# Patient Record
Sex: MALE | Race: WHITE | NOT HISPANIC OR LATINO | Employment: OTHER | URBAN - METROPOLITAN AREA
[De-identification: names, ages, dates, MRNs, and addresses within clinical notes are randomized per-mention and may not be internally consistent; named-entity substitution may affect disease eponyms.]

---

## 2017-02-13 ENCOUNTER — CLINICAL SUPPORT (OUTPATIENT)
Dept: ELECTROPHYSIOLOGY | Facility: CLINIC | Age: 77
End: 2017-02-13
Payer: MEDICARE

## 2017-02-13 ENCOUNTER — TELEPHONE (OUTPATIENT)
Dept: ELECTROPHYSIOLOGY | Facility: CLINIC | Age: 77
End: 2017-02-13

## 2017-02-13 DIAGNOSIS — Z95.0 CARDIAC PACEMAKER IN SITU: ICD-10-CM

## 2017-02-13 DIAGNOSIS — I44.2 COMPLETE AV BLOCK: ICD-10-CM

## 2017-02-13 PROCEDURE — 93294 REM INTERROG EVL PM/LDLS PM: CPT | Mod: S$GLB,,, | Performed by: INTERNAL MEDICINE

## 2017-02-13 PROCEDURE — 93296 REM INTERROG EVL PM/IDS: CPT | Mod: S$GLB,,, | Performed by: INTERNAL MEDICINE

## 2017-02-13 NOTE — TELEPHONE ENCOUNTER
I called patient to remind him to send his pacemaker remote transmission. Patient stated he will send it when he gets home from work today.

## 2017-03-31 ENCOUNTER — TELEPHONE (OUTPATIENT)
Dept: FAMILY MEDICINE | Facility: CLINIC | Age: 77
End: 2017-03-31

## 2017-03-31 ENCOUNTER — HOSPITAL ENCOUNTER (OUTPATIENT)
Dept: CARDIOLOGY | Facility: CLINIC | Age: 77
Discharge: HOME | End: 2017-03-31
Payer: MEDICARE

## 2017-03-31 ENCOUNTER — OFFICE VISIT (OUTPATIENT)
Dept: INTERNAL MEDICINE | Facility: CLINIC | Age: 77
End: 2017-03-31
Payer: MEDICARE

## 2017-03-31 VITALS
HEIGHT: 72 IN | DIASTOLIC BLOOD PRESSURE: 66 MMHG | SYSTOLIC BLOOD PRESSURE: 122 MMHG | WEIGHT: 241.88 LBS | HEART RATE: 78 BPM | BODY MASS INDEX: 32.76 KG/M2 | OXYGEN SATURATION: 98 %

## 2017-03-31 DIAGNOSIS — J84.10 CALCIFIED GRANULOMA OF LUNG: ICD-10-CM

## 2017-03-31 DIAGNOSIS — I42.9 CARDIOMYOPATHY, UNSPECIFIED TYPE: ICD-10-CM

## 2017-03-31 DIAGNOSIS — I48.0 PAROXYSMAL ATRIAL FIBRILLATION: ICD-10-CM

## 2017-03-31 DIAGNOSIS — E78.00 PURE HYPERCHOLESTEROLEMIA: Chronic | ICD-10-CM

## 2017-03-31 DIAGNOSIS — I25.10 CORONARY ARTERY DISEASE INVOLVING NATIVE CORONARY ARTERY OF NATIVE HEART WITHOUT ANGINA PECTORIS: ICD-10-CM

## 2017-03-31 DIAGNOSIS — Z00.00 ENCOUNTER FOR PREVENTIVE HEALTH EXAMINATION: Primary | ICD-10-CM

## 2017-03-31 DIAGNOSIS — I44.2 AV BLOCK, COMPLETE: ICD-10-CM

## 2017-03-31 DIAGNOSIS — I42.9 PRIMARY CARDIOMYOPATHY: Chronic | ICD-10-CM

## 2017-03-31 DIAGNOSIS — Z23 NEED FOR VACCINATION AGAINST STREPTOCOCCUS PNEUMONIAE: ICD-10-CM

## 2017-03-31 DIAGNOSIS — I42.9 CARDIOMYOPATHY: ICD-10-CM

## 2017-03-31 PROBLEM — J98.4 CALCIFIED GRANULOMA OF LUNG: Status: ACTIVE | Noted: 2017-03-31

## 2017-03-31 LAB
AORTIC VALVE REGURGITATION: ABNORMAL
DIASTOLIC DYSFUNCTION: YES
ESTIMATED PA SYSTOLIC PRESSURE: 26.04
RETIRED EF AND QEF - SEE NOTES: 40 (ref 55–65)
TRICUSPID VALVE REGURGITATION: ABNORMAL

## 2017-03-31 PROCEDURE — 3074F SYST BP LT 130 MM HG: CPT | Mod: S$GLB,,, | Performed by: NURSE PRACTITIONER

## 2017-03-31 PROCEDURE — 90670 PCV13 VACCINE IM: CPT | Mod: S$GLB,,, | Performed by: NURSE PRACTITIONER

## 2017-03-31 PROCEDURE — G0439 PPPS, SUBSEQ VISIT: HCPCS | Mod: S$GLB,,, | Performed by: NURSE PRACTITIONER

## 2017-03-31 PROCEDURE — G0009 ADMIN PNEUMOCOCCAL VACCINE: HCPCS | Mod: S$GLB,,, | Performed by: NURSE PRACTITIONER

## 2017-03-31 PROCEDURE — 3078F DIAST BP <80 MM HG: CPT | Mod: S$GLB,,, | Performed by: NURSE PRACTITIONER

## 2017-03-31 PROCEDURE — 99999 PR PBB SHADOW E&M-EST. PATIENT-LVL IV: CPT | Mod: PBBFAC,,, | Performed by: NURSE PRACTITIONER

## 2017-03-31 PROCEDURE — 93306 TTE W/DOPPLER COMPLETE: CPT | Mod: S$GLB,,, | Performed by: INTERNAL MEDICINE

## 2017-03-31 NOTE — TELEPHONE ENCOUNTER
Left message to [employer] [phone] to return call to clinic to schedule follow up appointment

## 2017-03-31 NOTE — PATIENT INSTRUCTIONS
Counseling and Referral of Other Preventative  (Italic type indicates deductible and co-insurance are waived)    Patient Name: Aldo Doll  Today's Date: 3/31/2017      SERVICE LIMITATIONS RECOMMENDATION    Vaccines    · Pneumococcal (once after 65)    · Influenza (annually)    · Hepatitis B (if medium/high risk)    · Prevnar 13      Hepatitis B medium/high risk factors:       - End-stage renal disease       - Hemophiliacs who received Factor VII or         IX concentrates       - Clients of institutions for the mentally             retarded       - Persons who live in the same house as          a HepB carrier       - Homosexual men       - Illicit injectable drug abusers     Pneumococcal: Due in 1 year     Influenza: Done, repeat in one year     Hepatitis B: N/A     Prevnar 13: Done, no repeat necessary    Prostate cancer screening (annually to age 75)     Prostate specific antigen (PSA) Shared decision making with Provider. Sometimes a co-pay may be required if the patient decides to have this test. The USPSTF no longer recommends prostate cancer screening routinely in medicine: not to follow    Colorectal cancer screening (to age 75)    · Fecal occult blood test (annual)  · Flexible sigmoidoscopy (5y)  · Screening colonoscopy (10y)  · Barium enema   N/A    Diabetes self-management training (no USPSTF recommendations)  Requires referral by treating physician for patient with diabetes or renal disease. 10 hours of initial DSMT sessions of no less than 30 minutes each in a continuous 12-month period. 2 hours of follow-up DSMT in subsequent years.  N/A    Glaucoma screening (no USPSTF recommendation)  Diabetes mellitus, family history   , age 50 or over    American, age 65 or over  Last done 4/15/13, recommend to repeat every 5  years    Medical nutrition therapy for diabetes or renal disease (no recommended schedule)  Requires referral by treating physician for patient with diabetes or  renal disease or kidney transplant within the past 3 years.  Can be provided in same year as diabetes self-management training (DSMT), and CMS recommends medical nutrition therapy take place after DSMT. Up to 3 hours for initial year and 2 hours in subsequent years.  N/A    Cardiovascular screening blood tests (every 5 years)  · Fasting lipid panel  Order as a panel if possible  Last done 10/8/14, recommend to repeat every 2  years    Diabetes screening tests (at least every 3 years, Medicare covers annually or at 6-month intervals for prediabetic patients)  · Fasting blood sugar (FBS) or glucose tolerance test (GTT)  Patient must be diagnosed with one of the following:       - Hypertension       - Dyslipidemia       - Obesity (BMI 30kg/m2)       - Previous elevated impaired FBS or GTT       ... or any two of the following:       - Overweight (BMI 25 but <30)       - Family history of diabetes       - Age 65 or older       - History of gestational diabetes or birth of baby weighing more than 9 pounds  Last done 2/23/15, recommend to repeat every 3  years    Abdominal aortic aneurysm screening (once)  · Sonogram   Limited to patients who meet one of the following criteria:       - Men who are 65-75 years old and have smoked more than 100 cigarette in their lifetime       - Anyone with a family history of abdominal aortic aneurysm       - Anyone recommended for screening by the USPSTF  N/A    HIV screening (annually for increased risk patients)  · HIV-1 and HIV-2 by EIA, or HAZEL, rapid antibody test or oral mucosa transudate  Patients must be at increased risk for HIV infection per USPSTF guidelines or pregnant. Tests covered annually for patient at increased risk or as requested by the patient. Pregnant patients may receive up to 3 tests during pregnancy.  Risks discussed, screening is not recommended    Smoking cessation counseling (up to 8 sessions per year)  Patients must be asymptomatic of tobacco-related  conditions to receive as a preventative service.  Non-smoker    Subsequent annual wellness visit  At least 12 months since last AWV  Return in one year     The following information is provided to all patients.  This information is to help you find resources for any of the problems found today that may be affecting your health:                Living healthy guide: www.[facility]      Understanding Diabetes: www.diabetes.org      Eating healthy: www.cdc.gov/healthyweight      CDC home safety checklist: www.cdc.gov/steadi/patient.html      Agency on Aging: www.goea.[place].[facility]      Alcoholics anonymous (AA): www.aa.org      Physical Activity: www.david.nih.gov/lb3ysmm      Tobacco use: www.quitwithusla.org

## 2017-03-31 NOTE — Clinical Note
Dr Denton,  I saw Mr Doll for an HRA today.  He would like to continue to see you as his PCP. He said he thought you were a good doctor and liked seeing you.  But, he has had difficulty scheduling a visit with you.  I tried to schedule a follow up, but I was unable to find a schedule on which I could book him an appointment with you.  If you are still doing primary care, and he is still your patient, can you arrange an appointment for him? If not, can you let me know so I can make other arrangements for him? Thank you DEMETRICE Guan

## 2017-03-31 NOTE — PROGRESS NOTES
Aldo Doll presented for a  Medicare AWV and comprehensive Health Risk Assessment today. The following components were reviewed and updated:    · Medical history  · Family History  · Social history  · Allergies and Current Medications  · Health Risk Assessment  · Health Maintenance  · Care Team     ** See Completed Assessments for Annual Wellness Visit within the encounter summary.**       The following assessments were completed:  · Living Situation  · CAGE  · Depression Screening  · Timed Get Up and Go  · Whisper Test  · Cognitive Function Screening  · Nutrition Screening  · ADL Screening  · PAQ Screening    Vitals:    03/31/17 0909   BP: 122/66   Pulse: 78   SpO2: 98%   Weight: 109.7 kg (241 lb 13.5 oz)   Height: 6' (1.829 m)     Body mass index is 32.8 kg/(m^2).  Physical Exam      Diagnoses and health risks identified today and associated recommendations/orders:    1. Encounter for preventive health examination  - Screenings performed, as noted above. Personal preventative testing needs reviewed.     2. Need for vaccination against Streptococcus pneumoniae  - PNEUMOCOCCAL CONJUGATE VACCINE 13-VALENT LESS THAN 6YO & GREATER THAN 51YO IM    3. Coronary artery disease involving native coronary artery of native heart without angina pectoris  - Lipid panel; Future    4. Cardiomyopathy, unspecified type  - Stable; followed by cardiology    5. AV block, complete  - pacemaker in place, followed by cardiology    6. Paroxysmal atrial fibrillation  - anticoagulated on eliquis, followed by cardiology    7. Primary cardiomyopathy  - Stable; followed by cardiology    8. Pure hypercholesterolemia  - worsened on last labs, was unable to tolerate pravastatin.   - Lipid panel; Future    9. Calcified granuloma of lung  - Stable, chronic finding on CXR 11/30/12, 12/25/13      Provided Aldo with a 5-10 year written screening schedule and personal prevention plan. Recommendations were developed using the USPSTF age appropriate  recommendations. Education, counseling, and referrals were provided as needed. After Visit Summary printed and given to patient which includes a list of additional screenings\tests needed.    Return in about 1 year (around 3/31/2018) for your next annual wellness visit.    Carlie Lomax, NP

## 2017-03-31 NOTE — TELEPHONE ENCOUNTER
----- Message from Anthony Denton MD sent at 3/31/2017 12:07 PM CDT -----  Please schedule patient for routine follow up in one month. Thank you

## 2017-03-31 NOTE — TELEPHONE ENCOUNTER
----- Message from Angelito Castillo sent at 3/31/2017 10:11 AM CDT -----  Contact: self  Pt need an EPP appointment per Carlie Lomax NP.  Please call pt to schedule.  Thanks!

## 2017-03-31 NOTE — TELEPHONE ENCOUNTER
Left message with wife to have patient return call to clinic re: to schedule appointment with Dr. Denton

## 2017-04-01 ENCOUNTER — LAB VISIT (OUTPATIENT)
Dept: LAB | Facility: HOSPITAL | Age: 77
End: 2017-04-01
Payer: MEDICARE

## 2017-04-01 DIAGNOSIS — I25.10 CORONARY ARTERY DISEASE INVOLVING NATIVE CORONARY ARTERY OF NATIVE HEART WITHOUT ANGINA PECTORIS: ICD-10-CM

## 2017-04-01 DIAGNOSIS — E78.00 PURE HYPERCHOLESTEROLEMIA: Chronic | ICD-10-CM

## 2017-04-01 LAB
CHOLEST/HDLC SERPL: 4 {RATIO}
HDL/CHOLESTEROL RATIO: 25.1 %
HDLC SERPL-MCNC: 207 MG/DL
HDLC SERPL-MCNC: 52 MG/DL
LDLC SERPL CALC-MCNC: 136.8 MG/DL
NONHDLC SERPL-MCNC: 155 MG/DL
TRIGL SERPL-MCNC: 91 MG/DL

## 2017-04-01 PROCEDURE — 80061 LIPID PANEL: CPT

## 2017-04-01 PROCEDURE — 36415 COLL VENOUS BLD VENIPUNCTURE: CPT | Mod: PO

## 2017-04-10 ENCOUNTER — OFFICE VISIT (OUTPATIENT)
Dept: FAMILY MEDICINE | Facility: CLINIC | Age: 77
End: 2017-04-10
Payer: MEDICARE

## 2017-04-10 VITALS
HEIGHT: 72 IN | WEIGHT: 242.31 LBS | DIASTOLIC BLOOD PRESSURE: 78 MMHG | HEART RATE: 88 BPM | OXYGEN SATURATION: 96 % | RESPIRATION RATE: 18 BRPM | SYSTOLIC BLOOD PRESSURE: 134 MMHG | BODY MASS INDEX: 32.82 KG/M2 | TEMPERATURE: 97 F

## 2017-04-10 DIAGNOSIS — E78.5 HYPERLIPIDEMIA, UNSPECIFIED HYPERLIPIDEMIA TYPE: ICD-10-CM

## 2017-04-10 DIAGNOSIS — I50.22 CHRONIC SYSTOLIC CONGESTIVE HEART FAILURE: ICD-10-CM

## 2017-04-10 DIAGNOSIS — I48.0 PAROXYSMAL ATRIAL FIBRILLATION: Primary | ICD-10-CM

## 2017-04-10 PROCEDURE — 99499 UNLISTED E&M SERVICE: CPT | Mod: S$GLB,,, | Performed by: INTERNAL MEDICINE

## 2017-04-10 PROCEDURE — 1160F RVW MEDS BY RX/DR IN RCRD: CPT | Mod: S$GLB,,, | Performed by: INTERNAL MEDICINE

## 2017-04-10 PROCEDURE — 99214 OFFICE O/P EST MOD 30 MIN: CPT | Mod: S$GLB,,, | Performed by: INTERNAL MEDICINE

## 2017-04-10 PROCEDURE — 1157F ADVNC CARE PLAN IN RCRD: CPT | Mod: S$GLB,,, | Performed by: INTERNAL MEDICINE

## 2017-04-10 PROCEDURE — 1126F AMNT PAIN NOTED NONE PRSNT: CPT | Mod: S$GLB,,, | Performed by: INTERNAL MEDICINE

## 2017-04-10 PROCEDURE — 99999 PR PBB SHADOW E&M-EST. PATIENT-LVL III: CPT | Mod: PBBFAC,,, | Performed by: INTERNAL MEDICINE

## 2017-04-10 PROCEDURE — 1159F MED LIST DOCD IN RCRD: CPT | Mod: S$GLB,,, | Performed by: INTERNAL MEDICINE

## 2017-04-10 PROCEDURE — 3075F SYST BP GE 130 - 139MM HG: CPT | Mod: S$GLB,,, | Performed by: INTERNAL MEDICINE

## 2017-04-10 PROCEDURE — 3078F DIAST BP <80 MM HG: CPT | Mod: S$GLB,,, | Performed by: INTERNAL MEDICINE

## 2017-04-10 RX ORDER — METOPROLOL SUCCINATE 100 MG/1
100 TABLET, EXTENDED RELEASE ORAL DAILY
Qty: 90 TABLET | Refills: 3 | Status: SHIPPED | OUTPATIENT
Start: 2017-04-10 | End: 2017-05-16 | Stop reason: SDUPTHER

## 2017-04-10 RX ORDER — VALSARTAN 160 MG/1
160 TABLET ORAL DAILY
Qty: 90 TABLET | Refills: 2 | Status: SHIPPED | OUTPATIENT
Start: 2017-04-10 | End: 2017-05-16 | Stop reason: SDUPTHER

## 2017-04-10 NOTE — PROGRESS NOTES
Subjective:       Patient ID: Aldo Doll is a 76 y.o. male.    Chief Complaint: Results; Follow-up; and Cough    HPI Comments: F/u chronic conditions    HPI: 77 y/o w/ HTN systolic HF, HLD and afib presents for scheduled follow up feels.w ell exercising at gym four times per week w/o exertional symptoms still working 20hrs per week as well.f eels well no LE swelling no palpitations no syncope/presyncope    Review of Systems   Constitutional: Negative for activity change, appetite change, fatigue, fever and unexpected weight change.   HENT: Negative for ear pain, rhinorrhea and sore throat.    Eyes: Negative for discharge and visual disturbance.   Respiratory: Negative for chest tightness, shortness of breath and wheezing.    Cardiovascular: Negative for chest pain, palpitations and leg swelling.   Gastrointestinal: Negative for abdominal pain, constipation and diarrhea.   Endocrine: Negative for cold intolerance and heat intolerance.   Genitourinary: Negative for dysuria and hematuria.   Musculoskeletal: Negative for joint swelling and neck stiffness.   Skin: Negative for rash.   Neurological: Negative for dizziness, syncope, weakness and headaches.   Psychiatric/Behavioral: Negative for suicidal ideas.       Objective:     Vitals:    04/10/17 1518   BP: 134/78   BP Location: Right arm   Patient Position: Sitting   BP Method: Manual   Pulse: 88   Resp: 18   Temp: 97.4 °F (36.3 °C)   TempSrc: Oral   SpO2: 96%   Weight: 109.9 kg (242 lb 4.6 oz)   Height: 6' (1.829 m)          Physical Exam   Constitutional: He is oriented to person, place, and time. He appears well-developed and well-nourished.   HENT:   Head: Normocephalic and atraumatic.   Eyes: Conjunctivae are normal. Pupils are equal, round, and reactive to light.   Neck: Normal range of motion.   Cardiovascular: Normal rate.  Exam reveals no gallop and no friction rub.    No murmur heard.  Pulmonary/Chest: Effort normal and breath sounds normal. He has no  wheezes. He has no rales.   Abdominal: Soft. Bowel sounds are normal. There is no tenderness. There is no rebound and no guarding.   Musculoskeletal: Normal range of motion. He exhibits no edema or tenderness.   Neurological: He is alert and oriented to person, place, and time. No cranial nerve deficit.   Skin: Skin is warm and dry.   Psychiatric: He has a normal mood and affect.       Assessment:       1. Paroxysmal atrial fibrillation    2. Chronic systolic congestive heart failure    3. Hyperlipidemia, unspecified hyperlipidemia type        Plan:    1. Rate controlled on NOAC continue    2. euvolemic on ARB BP at goal    3. Unable to tolerate statin continue omega 3

## 2017-04-25 ENCOUNTER — TELEPHONE (OUTPATIENT)
Dept: CARDIOLOGY | Facility: CLINIC | Age: 77
End: 2017-04-25

## 2017-04-25 ENCOUNTER — TELEPHONE (OUTPATIENT)
Dept: ELECTROPHYSIOLOGY | Facility: CLINIC | Age: 77
End: 2017-04-25

## 2017-04-25 DIAGNOSIS — I25.10 CORONARY ARTERY DISEASE, ANGINA PRESENCE UNSPECIFIED, UNSPECIFIED VESSEL OR LESION TYPE, UNSPECIFIED WHETHER NATIVE OR TRANSPLANTED HEART: Primary | ICD-10-CM

## 2017-04-25 NOTE — TELEPHONE ENCOUNTER
----- Message from Ana Mac RN sent at 4/25/2017  3:21 PM CDT -----  Contact: Wife of pt called  SHERIE Nation,     Please see message below. I tried to call the Pt's spouse, however she did not answer. Can you please try and reach out to her tomorrow. This may need a shift intervention.       Thanks,     RV  ----- Message -----     From: Verenice Hernandez     Sent: 4/25/2017   1:51 PM       To: [facility] Arrhythmia Rn Staff    Wife of pt called, states their is a confusion with RX Diovan. She states pt is coughing and is needing to speak width you.Ph [phone] or [phone]. Thank you

## 2017-04-25 NOTE — TELEPHONE ENCOUNTER
Pt's wife reports she notes pt coughing more lately and realized pt has been taking generic brand of diovan for past month, and is concerned this is the cause. She is asking for refill in brand name only, and is not concerned about cost. Called [facility] and authorized brand name for pt on refill.

## 2017-05-16 ENCOUNTER — OFFICE VISIT (OUTPATIENT)
Dept: CARDIOLOGY | Facility: CLINIC | Age: 77
End: 2017-05-16
Payer: MEDICARE

## 2017-05-16 ENCOUNTER — HOSPITAL ENCOUNTER (OUTPATIENT)
Dept: CARDIOLOGY | Facility: CLINIC | Age: 77
Discharge: HOME | End: 2017-05-16
Payer: MEDICARE

## 2017-05-16 VITALS
HEIGHT: 72 IN | BODY MASS INDEX: 33 KG/M2 | DIASTOLIC BLOOD PRESSURE: 63 MMHG | SYSTOLIC BLOOD PRESSURE: 117 MMHG | WEIGHT: 243.63 LBS | HEART RATE: 73 BPM

## 2017-05-16 DIAGNOSIS — I50.22 CHRONIC SYSTOLIC CONGESTIVE HEART FAILURE: ICD-10-CM

## 2017-05-16 DIAGNOSIS — I48.0 PAROXYSMAL ATRIAL FIBRILLATION: Primary | ICD-10-CM

## 2017-05-16 DIAGNOSIS — Z79.01 CURRENT USE OF LONG TERM ANTICOAGULATION: ICD-10-CM

## 2017-05-16 DIAGNOSIS — I42.9 PRIMARY CARDIOMYOPATHY: Chronic | ICD-10-CM

## 2017-05-16 DIAGNOSIS — I25.10 CORONARY ARTERY DISEASE INVOLVING NATIVE CORONARY ARTERY OF NATIVE HEART WITHOUT ANGINA PECTORIS: ICD-10-CM

## 2017-05-16 DIAGNOSIS — Z95.0 CARDIAC PACEMAKER IN SITU: ICD-10-CM

## 2017-05-16 DIAGNOSIS — I44.2 AV BLOCK, COMPLETE: ICD-10-CM

## 2017-05-16 DIAGNOSIS — I25.10 CORONARY ARTERY DISEASE, ANGINA PRESENCE UNSPECIFIED, UNSPECIFIED VESSEL OR LESION TYPE, UNSPECIFIED WHETHER NATIVE OR TRANSPLANTED HEART: ICD-10-CM

## 2017-05-16 PROCEDURE — 1159F MED LIST DOCD IN RCRD: CPT | Mod: GC,S$GLB,, | Performed by: INTERNAL MEDICINE

## 2017-05-16 PROCEDURE — 3078F DIAST BP <80 MM HG: CPT | Mod: GC,S$GLB,, | Performed by: INTERNAL MEDICINE

## 2017-05-16 PROCEDURE — 1126F AMNT PAIN NOTED NONE PRSNT: CPT | Mod: GC,S$GLB,, | Performed by: INTERNAL MEDICINE

## 2017-05-16 PROCEDURE — 1160F RVW MEDS BY RX/DR IN RCRD: CPT | Mod: GC,S$GLB,, | Performed by: INTERNAL MEDICINE

## 2017-05-16 PROCEDURE — 3074F SYST BP LT 130 MM HG: CPT | Mod: GC,S$GLB,, | Performed by: INTERNAL MEDICINE

## 2017-05-16 PROCEDURE — 99213 OFFICE O/P EST LOW 20 MIN: CPT | Mod: GC,S$GLB,, | Performed by: INTERNAL MEDICINE

## 2017-05-16 PROCEDURE — 93000 ELECTROCARDIOGRAM COMPLETE: CPT | Mod: S$GLB,,, | Performed by: INTERNAL MEDICINE

## 2017-05-16 PROCEDURE — 99999 PR PBB SHADOW E&M-EST. PATIENT-LVL III: CPT | Mod: PBBFAC,GC,, | Performed by: INTERNAL MEDICINE

## 2017-05-16 RX ORDER — VALSARTAN 160 MG/1
160 TABLET ORAL DAILY
Qty: 90 TABLET | Refills: 4 | Status: SHIPPED | OUTPATIENT
Start: 2017-05-16 | End: 2018-04-18 | Stop reason: SDUPTHER

## 2017-05-16 RX ORDER — METOPROLOL SUCCINATE 100 MG/1
100 TABLET, EXTENDED RELEASE ORAL DAILY
Qty: 90 TABLET | Refills: 4 | Status: SHIPPED | OUTPATIENT
Start: 2017-05-16 | End: 2018-08-06 | Stop reason: SDUPTHER

## 2017-05-16 NOTE — MR AVS SNAPSHOT
[facility]  [address]  [facility] [address]  Phone: [phone]                  Aldo Doll   2017 3:00 PM   Office Visit    Description:  Male : 1940   Provider:  Antonio Guerin MD   Department:  [facility]           Reason for Visit     Atrial Fibrillation           Diagnoses this Visit        Comments    Paroxysmal atrial fibrillation    -  Primary     AV block, complete         Primary cardiomyopathy         Coronary artery disease involving native coronary artery of native heart without angina pectoris         Cardiac pacemaker in situ         Current use of long term anticoagulation         Chronic systolic congestive heart failure                To Do List           Future Appointments        Provider Department Dept Phone    2017 8:00 AM HOME MONITOR DEVICE CHECK, [facility] [phone]      Goals (5 Years of Data)     None      Follow-Up and Disposition     Return in about 1 year (around 2018).    Follow-up and Disposition History       These Medications        Disp Refills Start End    valsartan (DIOVAN) 160 MG tablet 90 tablet 4 2017     Take 1 tablet (160 mg total) by mouth once daily. - Oral    Pharmacy: [address] Ph #: [phone]       metoprolol succinate (TOPROL-XL) 100 MG 24 hr tablet 90 tablet 4 2017     Take 1 tablet (100 mg total) by mouth once daily. - Oral    Pharmacy: [address] Ph #: [phone]       Notes to Pharmacy: PT WANTS A 90 DAY SUPLLY ON HER MEDS      [facility] On [facility]     [facility] - 24/ Assistance  Unless otherwise directed by your provider, please contact [facility], our nurse care line that is available for / assistance.     Registered nurses in the [facility] provide: appointment scheduling, clinical advisement, health  education, and other advisory services.  Call: [phone] (toll free)               Medications           Message regarding Medications     Verify the changes and/or additions to your medication regime listed below are the same as discussed with your clinician today.  If any of these changes or additions are incorrect, please notify your healthcare provider.             Verify that the below list of medications is an accurate representation of the medications you are currently taking.  If none reported, the list may be blank. If incorrect, please contact your healthcare provider. Carry this list with you in case of emergency.           Current Medications     ELIQUIS 5 mg Tab TAKE ONE TABLET BY MOUTH TWICE DAILY    fish oil-omega-3 fatty acids 300-1,000 mg capsule Take 3 g by mouth once daily.    metoprolol succinate (TOPROL-XL) 100 MG 24 hr tablet Take 1 tablet (100 mg total) by mouth once daily.    valsartan (DIOVAN) 160 MG tablet Take 1 tablet (160 mg total) by mouth once daily.           Clinical Reference Information           Your Vitals Were     BP Pulse Height Weight BMI    117/63 (BP Location: Left arm, Patient Position: Sitting, BP Method: Automatic) 73 6' (1.829 m) 110.5 kg (243 lb 9.7 oz) 33.04 kg/m2      Blood Pressure          Most Recent Value    Right Arm BP - Sitting  119/67    Left Arm BP - Sitting  117/63    BP  117/63      Allergies as of 5/16/2017     Pradaxa [Dabigatran Etexilate]    Ace Inhibitors    Chocolate Flavor      Immunizations Administered on Date of Encounter - 5/16/2017     None      Language Assistance Services     ATTENTION: Language assistance services are available, free of charge. Please call [phone].      ATENCIÓN: Si habla español, tiene a warren disposición servicios gratuitos de asistencia lingüística. Llame al [phone].     CHÚ Ý: N?u b?n nói Ti?ng Vi?t, có các d?ch v? h? tr? ngôn ng? mi?n phí dành cho b?n. G?i s? [phone].         [facility]  complies with applicable Federal civil rights laws and does not discriminate on the basis of race, color, national origin, age, disability, or sex.

## 2017-05-16 NOTE — PROGRESS NOTES
Cardiology Clinic Note    5/16/2017    LAURA Doll is a 76 y.o. 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  He was seen by Dr. Monteiro in Nov 2016 and was transitioned from Coumadin to Eliquis at that time. He reports doing well since that time with Eliquis without any significant episodes of bleeding. He reports he was previously placed on Cozaar due to increased copayment from his insurance with his previously prescribed Diovan but reports when he was on Cozaar, he started having significant cough. Since that time, been placed back on Diovan which he reports he is able to pay for since he is getting generic Diovan. Otherwise, he reports feeling well. He is still working and currently building a boat.     He denies chest pain, SOB, orthopnea, PND, JOY, palpitations, lightheadedness, syncope, LE edema.    Review of Systems   Constitution: Negative for chills, fever  Cardiovascular: Negative for chest pain dyspnea on exertion, leg swelling, orthopnea, palpitations, paroxysmal nocturnal dyspnea.   Respiratory: Negative for shortness of breath. Negative for cough.    Endocrine: Negative for heat or cold intolerance    Hematologic/Lymphatic: Negative for easy bruising or bleeding    Skin: Negative for color change and rash.   Musculoskeletal: Positive for left hip pain which is chronic  Gastrointestinal: Negative for abdominal pain, nausea, vomiting, diarrhea  Neurological: Negative for dizziness, light-headedness and loss of balance.   Psychiatric/Behavioral: Negative for altered mental status.     OBJECTIVE  Current Outpatient Prescriptions   Medication Sig Dispense Refill    ELIQUIS 5 mg Tab TAKE ONE TABLET BY MOUTH TWICE DAILY 180 tablet 3    fish oil-omega-3 fatty acids 300-1,000 mg capsule Take 3 g by mouth  once daily.      metoprolol succinate (TOPROL-XL) 100 MG 24 hr tablet Take 1 tablet (100 mg total) by mouth once daily. 90 tablet 4    valsartan (DIOVAN) 160 MG tablet Take 1 tablet (160 mg total) by mouth once daily. 90 tablet 4     Current Facility-Administered Medications   Medication Dose Route Frequency Provider Last Rate Last Dose    levalbuterol nebulizer solution 0.63 mg  0.63 mg Nebulization 1 time in Clinic/HOD hCu Llamas MD           Past Medical History:   Diagnosis Date    *Atrial fibrillation     Anticoagulant long-term use     Arthritis     Atrial fibrillation     Cardiomyopathy     CHF (congestive heart failure)     Coronary artery disease     Heart block     Hyperlipidemia     Hypertension        Past Surgical History:   Procedure Laterality Date    CHOLECYSTECTOMY      HERNIA REPAIR      INSERT / REPLACE / REMOVE PACEMAKER         Review of patient's allergies indicates:   Allergen Reactions    Pradaxa [dabigatran etexilate] Itching and Rash    Ace inhibitors      Other reaction(s): cough    Chocolate flavor      Other reaction(s): Rash       Family History   Problem Relation Age of Onset    Cancer Mother      breast    Cancer Sister      breast    Mental illness Son      depression       Social History     Social History    Marital status:      Spouse name: N/A    Number of children: N/A    Years of education: N/A     Social History Main Topics    Smoking status: Never Smoker    Smokeless tobacco: Never Used    Alcohol use 1.8 oz/week     3 Glasses of wine per week      Comment: social    Drug use: No    Sexual activity: Yes     Partners: Female     Other Topics Concern    None     Social History Narrative       Vitals: /63 (BP Location: Left arm, Patient Position: Sitting, BP Method: Automatic)  Pulse 73  Ht 6' (1.829 m)  Wt 110.5 kg (243 lb 9.7 oz)  BMI 33.04 kg/m2     Physical Exam  Gen: NAD  Head/Eyes/Ears/Nose: NCAT, MMM, good dentition    Neck: No carotid bruits, no JVD  Lung: Clear to auscultation bilaterally, no wheezes  Heart: Normal S1/S2, regular rate and rhythm  Abdomen: Soft, NT/ND, no masses  Extremities: No lower extremity edema.  No wounds or other skin lesions  Skin: Normal color and turgor. No wounds rashes, no petechia, no ecchymoses.   Neuro: AAOx3    EKG Reviewed: AV paced rhythm.  Echo from March 2017 reviewed: EF 40%. No significant changes.     Labs  Lab Results   Component Value Date    WBC 4.89 06/09/2015    HGB 12.7 (L) 06/09/2015    HCT 36.2 (L) 06/09/2015    PLT 86 (L) 06/09/2015    CHOL 207 (H) 04/01/2017    TRIG 91 04/01/2017    HDL 52 04/01/2017    ALT 31 06/09/2015    AST 26 06/09/2015     06/09/2015    K 4.0 06/09/2015     06/09/2015    CREATININE 1.0 06/09/2015    BUN 20 06/09/2015    CO2 28 06/09/2015    TSH 1.808 03/27/2015    PSA 1.3 02/23/2015    INR 1.0 11/16/2015    HGBA1C 5.3 02/23/2015      76 y.o. male with complete heart block s/p dual chamber PPM with generator change on 5/28/2015, non obstructive CAD with mildly depressed EF, AF on Eliquis, HTN who presents today to clinicfor yearly follow up visit.    Diagnoses and all orders for this visit:    HTN - Well controlled  - Continue Diovan 160 mg daily and Toprol- mg daily    Mildly depressed EF  - Continue ARB and B-blocker as above    Paroxysmal atrial fibrillation  Cardiac pacemaker in situ  Current use of long term anticoagulation  AV block, complete  - On Eliquis, no significant bleeding issues, could continue Eliquis 5 mg BID  - Rate control strategy with Toprol-XL    RTC in 1 year    Patient seen and discussed with Dr. Lizeth Guerin MD, MPH  PGY-IV  Cardiovascular Disease Fellow

## 2017-05-19 ENCOUNTER — CLINICAL SUPPORT (OUTPATIENT)
Dept: ELECTROPHYSIOLOGY | Facility: CLINIC | Age: 77
End: 2017-05-19
Payer: MEDICARE

## 2017-05-19 ENCOUNTER — TELEPHONE (OUTPATIENT)
Dept: ELECTROPHYSIOLOGY | Facility: CLINIC | Age: 77
End: 2017-05-19

## 2017-05-19 DIAGNOSIS — Z95.0 CARDIAC PACEMAKER IN SITU: ICD-10-CM

## 2017-05-19 DIAGNOSIS — I44.2 COMPLETE AV BLOCK: ICD-10-CM

## 2017-05-19 PROCEDURE — 93296 REM INTERROG EVL PM/IDS: CPT | Mod: S$GLB,,, | Performed by: INTERNAL MEDICINE

## 2017-05-19 PROCEDURE — 93294 REM INTERROG EVL PM/LDLS PM: CPT | Mod: S$GLB,,, | Performed by: INTERNAL MEDICINE

## 2017-05-19 NOTE — TELEPHONE ENCOUNTER
I called patient to remind him to send his remote pacemaker transmission. No answer. I called patients cell phone number. I spoke with patient. He will send his transmission now.

## 2017-10-17 ENCOUNTER — CLINICAL SUPPORT (OUTPATIENT)
Dept: ELECTROPHYSIOLOGY | Facility: CLINIC | Age: 77
End: 2017-10-17
Payer: MEDICARE

## 2017-10-17 DIAGNOSIS — Z95.0 PACEMAKER: Primary | ICD-10-CM

## 2017-10-17 DIAGNOSIS — I44.2 CHB (COMPLETE HEART BLOCK): ICD-10-CM

## 2017-10-17 DIAGNOSIS — I48.91 ATRIAL FIBRILLATION: ICD-10-CM

## 2017-10-17 DIAGNOSIS — Z95.0 PACEMAKER: ICD-10-CM

## 2017-10-17 DIAGNOSIS — I42.9 CARDIOMYOPATHY: ICD-10-CM

## 2017-10-17 PROCEDURE — 93296 REM INTERROG EVL PM/IDS: CPT | Mod: S$GLB,,, | Performed by: INTERNAL MEDICINE

## 2017-10-17 PROCEDURE — 93294 REM INTERROG EVL PM/LDLS PM: CPT | Mod: S$GLB,,, | Performed by: INTERNAL MEDICINE

## 2017-11-03 DIAGNOSIS — I48.91 ATRIAL FIBRILLATION: ICD-10-CM

## 2017-11-03 RX ORDER — APIXABAN 5 MG/1
TABLET, FILM COATED ORAL
Qty: 180 TABLET | Refills: 3 | Status: SHIPPED | OUTPATIENT
Start: 2017-11-03 | End: 2018-10-26 | Stop reason: SDUPTHER

## 2018-02-15 ENCOUNTER — TELEPHONE (OUTPATIENT)
Dept: ELECTROPHYSIOLOGY | Facility: CLINIC | Age: 78
End: 2018-02-15

## 2018-02-15 NOTE — TELEPHONE ENCOUNTER
----- Message from Divya Mccracken sent at 2/15/2018  4:28 PM CST -----  Contact: pt wife  Please call pt wife at [phone]. Patient is scheduled on 02/22/18 at 140 pm for a pacemaker tune up but need something in the morning    Thank you      Patients wife asked to move his appointment to a day that he can come in early. I moved it to April.

## 2018-04-05 ENCOUNTER — CLINICAL SUPPORT (OUTPATIENT)
Dept: ELECTROPHYSIOLOGY | Facility: CLINIC | Age: 78
End: 2018-04-05
Attending: INTERNAL MEDICINE
Payer: MEDICARE

## 2018-04-05 ENCOUNTER — TELEPHONE (OUTPATIENT)
Dept: ELECTROPHYSIOLOGY | Facility: CLINIC | Age: 78
End: 2018-04-05

## 2018-04-05 DIAGNOSIS — I48.91 ATRIAL FIBRILLATION: ICD-10-CM

## 2018-04-05 DIAGNOSIS — I42.9 CARDIOMYOPATHY: ICD-10-CM

## 2018-04-05 DIAGNOSIS — I44.2 CHB (COMPLETE HEART BLOCK): ICD-10-CM

## 2018-04-05 DIAGNOSIS — Z95.0 PACEMAKER: ICD-10-CM

## 2018-04-05 PROCEDURE — 93280 PM DEVICE PROGR EVAL DUAL: CPT | Mod: S$GLB,,, | Performed by: INTERNAL MEDICINE

## 2018-04-05 NOTE — TELEPHONE ENCOUNTER
Called and spoke with Pt. He was taking Eliquis 5 mg BID. He has sores on his back and when he takes the Eliquis as prescribed the sores bleed. So he started alternating the dose with 5 mg qd every other day and the bleeding stopped. Explained to the Pt that Eliquis is not effective when dose once a day. Advised I would speak with Dr Monteiro and get back with him.         ----- Message from Bry Monteiro MD sent at 4/5/2018  1:30 PM CDT -----  Regarding: RE: Eliquis  He should take 5 mg by mouth twice a day, not 10 mg all at once.  Clarice please  him.  Thanks  DPM    ----- Message -----  From: Pratima Monaeveena  Sent: 4/5/2018   8:52 AM  To: Bry Monteiro MD, Clarice Casiano RN  Subject: Eliquis                                          Dr. Monteiro,  Pt reports if he takes Eliquis 10 mg every other day and 5mg every other day alternating doses due to he has an abscess that starts bleeding if he takes 10 mg daily. States he never had this with Coumadin.  Hx of AF.    Please advise,  Monse Ambrocio

## 2018-04-05 NOTE — TELEPHONE ENCOUNTER
Attempted to contact Pt to discuss his Eliquis and proper dosing. No answer. Left message for Pt to return call.       ----- Message from Bry Monteiro MD sent at 4/5/2018  1:30 PM CDT -----  Regarding: RE: Eliquis  He should take 5 mg by mouth twice a day, not 10 mg all at once.  Clarice please  him.  Thanks  DPM    ----- Message -----  From: Pratima Monaeveena  Sent: 4/5/2018   8:52 AM  To: Bry Monteiro MD, Clarice Casiano RN  Subject: Eliquis                                          Dr. Monteiro,  Pt reports if he takes Eliquis 10 mg every other day and 5mg every other day alternating doses due to he has an abscess that starts bleeding if he takes 10 mg daily. States he never had this with Coumadin.  Hx of AF.    Please advise,  Monse Ambrocio

## 2018-04-05 NOTE — TELEPHONE ENCOUNTER
----- Message from Bry Monteiro MD sent at 4/5/2018  1:30 PM CDT -----  Regarding: RE: Eliquis  He should take 5 mg by mouth twice a day, not 10 mg all at once.  Clarice please  him.  Thanks  DPM    ----- Message -----  From: Pratima Carter  Sent: 4/5/2018   8:52 AM  To: Bry Monteiro MD, Clarice Casiano RN  Subject: Eliquis                                          Dr. Monteiro,  Pt reports if he takes Eliquis 10 mg every other day and 5mg every other day alternating doses due to he has an abscess that starts bleeding if he takes 10 mg daily. States he never had this with Coumadin.  Hx of AF.    Please advise,  Monse Ambrocio

## 2018-04-06 ENCOUNTER — TELEPHONE (OUTPATIENT)
Dept: ELECTROPHYSIOLOGY | Facility: CLINIC | Age: 78
End: 2018-04-06

## 2018-04-06 NOTE — TELEPHONE ENCOUNTER
----- Message from Bry Monteiro MD sent at 4/5/2018  4:32 PM CDT -----  Regarding: RE: Eliquis  He needs to get that sore evaluated and treated, so he can tolerate the appropriate dose of eliquis!  DPM    ----- Message -----  From: Clarice Casiano RN  Sent: 4/5/2018   4:28 PM  To: Bry Monteiro MD  Subject: RE: Eliquis                                      Spoke with Pt. He states he was taking 5mg BID. When he does that he has a sore on his back that bleeds. So he started taking it 5 mg BID one day then  5 mg qd. The bleeding from the sores on his back stopped. Advised that Eliquis is not effective when dosed once daily. Advised I would speak with you and see what needed to be done.  ----- Message -----  From: Bry Monteiro MD  Sent: 4/5/2018   1:30 PM  To: Clarice Brooks RN  Subject: RE: Eliquis                                      He should take 5 mg by mouth twice a day, not 10 mg all at once.  Clarice please  him.  Thanks  DPM    ----- Message -----  From: Pratima Carter  Sent: 4/5/2018   8:52 AM  To: Bry Monteiro MD, Clarice Casiano, RN  Subject: Katia Monteiro,  Pt reports if he takes Eliquis 10 mg every other day and 5mg every other day alternating doses due to he has an abscess that starts bleeding if he takes 10 mg daily. States he never had this with Coumadin.  Hx of AF.    Please advise,  Monse Ambrocio

## 2018-04-18 DIAGNOSIS — I50.22 CHRONIC SYSTOLIC CONGESTIVE HEART FAILURE: ICD-10-CM

## 2018-04-18 RX ORDER — VALSARTAN 160 MG/1
160 TABLET ORAL DAILY
Qty: 90 TABLET | Refills: 4 | Status: SHIPPED | OUTPATIENT
Start: 2018-04-18 | End: 2019-06-05 | Stop reason: SDUPTHER

## 2018-05-16 ENCOUNTER — PATIENT OUTREACH (OUTPATIENT)
Dept: ADMINISTRATIVE | Facility: HOSPITAL | Age: 78
End: 2018-05-16

## 2018-05-16 NOTE — PROGRESS NOTES
Sent a letter per mail for patient to call back and schedule appointment.  Patient has not been seen per Dr. Denton since 04/10/2017.

## 2018-05-22 ENCOUNTER — OFFICE VISIT (OUTPATIENT)
Dept: CARDIOLOGY | Facility: CLINIC | Age: 78
End: 2018-05-22
Payer: MEDICARE

## 2018-05-22 VITALS
SYSTOLIC BLOOD PRESSURE: 118 MMHG | WEIGHT: 244.69 LBS | HEIGHT: 72 IN | HEART RATE: 76 BPM | DIASTOLIC BLOOD PRESSURE: 66 MMHG | BODY MASS INDEX: 33.14 KG/M2

## 2018-05-22 DIAGNOSIS — Z95.0 CARDIAC PACEMAKER IN SITU: ICD-10-CM

## 2018-05-22 DIAGNOSIS — I42.9 CARDIOMYOPATHY, UNSPECIFIED TYPE: ICD-10-CM

## 2018-05-22 DIAGNOSIS — I48.0 PAROXYSMAL ATRIAL FIBRILLATION: ICD-10-CM

## 2018-05-22 DIAGNOSIS — I10 ESSENTIAL HYPERTENSION: Primary | ICD-10-CM

## 2018-05-22 PROCEDURE — 3078F DIAST BP <80 MM HG: CPT | Mod: GC,S$GLB,, | Performed by: INTERNAL MEDICINE

## 2018-05-22 PROCEDURE — 3074F SYST BP LT 130 MM HG: CPT | Mod: GC,S$GLB,, | Performed by: INTERNAL MEDICINE

## 2018-05-22 PROCEDURE — 99213 OFFICE O/P EST LOW 20 MIN: CPT | Mod: GC,S$GLB,, | Performed by: INTERNAL MEDICINE

## 2018-05-22 PROCEDURE — 99999 PR PBB SHADOW E&M-EST. PATIENT-LVL III: CPT | Mod: PBBFAC,GC,, | Performed by: INTERNAL MEDICINE

## 2018-05-22 NOTE — PROGRESS NOTES
Cardiology Clinic Note    5/22/2018    LAURA Doll is a 77 y.o. 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  He was seen by Dr. Monteiro in Nov 2016 and was transitioned from Coumadin to Eliquis at that time. He reports doing well since that time with Eliquis without any significant episodes of bleeding. Per notes from device clinic, patient had episodes where he had red spots on his back and there was concern that Eliquis may have been contributing so he changed his dosing from 5 mg BID to 10 mg daily. This was advised against and he resumed his dosing of 5 mg BID. He reports the red spots on his back spontaneously improved and has not had any recurrence.Otherwise, he reports feeling well. He is still working daily at the [employer] and continues to be active on a daily basis.    He denies chest pain, SOB, orthopnea, PND, JOY, palpitations, lightheadedness, syncope, LE edema.    Review of Systems   Constitution: Negative for chills, fever  Cardiovascular: Negative for chest pain dyspnea on exertion, leg swelling, orthopnea, palpitations, paroxysmal nocturnal dyspnea.   Respiratory: Negative for shortness of breath. Negative for cough.    Endocrine: Negative for heat or cold intolerance    Hematologic/Lymphatic: Negative for easy bruising or bleeding    Skin: Negative for color change and rash.   Musculoskeletal: Positive for left hip pain which is chronic  Gastrointestinal: Negative for abdominal pain, nausea, vomiting, diarrhea  Neurological: Negative for dizziness, light-headedness and loss of balance.   Psychiatric/Behavioral: Negative for altered mental status.     OBJECTIVE  Current Outpatient Prescriptions   Medication Sig Dispense Refill    ELIQUIS 5 mg Tab TAKE ONE Tablet BY MOUTH TWICE DAILY 180 tablet 3     fish oil-omega-3 fatty acids 300-1,000 mg capsule Take 3 g by mouth once daily.      metoprolol succinate (TOPROL-XL) 100 MG 24 hr tablet Take 1 tablet (100 mg total) by mouth once daily. 90 tablet 4    valsartan (DIOVAN) 160 MG tablet Take 1 tablet (160 mg total) by mouth once daily. 90 tablet 4     Current Facility-Administered Medications   Medication Dose Route Frequency Provider Last Rate Last Dose    levalbuterol nebulizer solution 0.63 mg  0.63 mg Nebulization 1 time in Clinic/HOD Chu Llamas MD           Past Medical History:   Diagnosis Date    *Atrial fibrillation     Anticoagulant long-term use     Arthritis     Atrial fibrillation     Cardiomyopathy     CHF (congestive heart failure)     Coronary artery disease     Heart block     Hyperlipidemia     Hypertension        Past Surgical History:   Procedure Laterality Date    CHOLECYSTECTOMY      HERNIA REPAIR      INSERT / REPLACE / REMOVE PACEMAKER         Review of patient's allergies indicates:   Allergen Reactions    Pradaxa [dabigatran etexilate] Itching and Rash    Ace inhibitors      Other reaction(s): cough    Chocolate flavor      Other reaction(s): Rash       Family History   Problem Relation Age of Onset    Cancer Mother         breast    Cancer Sister         breast    Mental illness Son         depression       Social History     Social History    Marital status:      Spouse name: N/A    Number of children: N/A    Years of education: N/A     Social History Main Topics    Smoking status: Never Smoker    Smokeless tobacco: Never Used    Alcohol use 1.8 oz/week     3 Glasses of wine per week      Comment: social    Drug use: No    Sexual activity: Yes     Partners: Female     Other Topics Concern    None     Social History Narrative    None       Vitals: /66 (BP Location: Left arm, Patient Position: Sitting, BP Method: Medium (Automatic))   Pulse 76   Ht 6' (1.829 m)   Wt 111 kg (244 lb 11.4  oz)   BMI 33.19 kg/m²      Physical Exam  Gen: NAD  Head/Eyes/Ears/Nose: NCAT, MMM, good dentition   Neck: No carotid bruits, no JVD  Lung: Clear to auscultation bilaterally, no wheezes  Heart: Normal S1/S2, regular rate and rhythm. PPM c/d/i. No erythrema or purulence.   Abdomen: Soft, NT/ND, no masses  Extremities: No lower extremity edema.  No wounds or other skin lesions  Skin: Normal color and turgor. No wounds rashes, no petechia, no ecchymoses.   Neuro: AAOx3    Labs  Lab Results   Component Value Date    WBC 4.89 06/09/2015    HGB 12.7 (L) 06/09/2015    HCT 36.2 (L) 06/09/2015    PLT 86 (L) 06/09/2015    CHOL 207 (H) 04/01/2017    TRIG 91 04/01/2017    HDL 52 04/01/2017    ALT 31 06/09/2015    AST 26 06/09/2015     06/09/2015    K 4.0 06/09/2015     06/09/2015    CREATININE 1.0 06/09/2015    BUN 20 06/09/2015    CO2 28 06/09/2015    TSH 1.808 03/27/2015    PSA 1.3 02/23/2015    INR 1.0 11/16/2015    HGBA1C 5.3 02/23/2015      77 y.o. 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    Diagnoses and all orders for this visit:    HTN - Well controlled  - Continue Diovan 160 mg daily and Toprol- mg daily    Mildly depressed EF  - Continue ARB and B-blocker as above    Paroxysmal atrial fibrillation  Cardiac pacemaker in situ  Current use of long term anticoagulation  AV block, complete  - On Eliquis, no significant bleeding issues, could continue Eliquis 5 mg BID  - Rate control strategy with Toprol-XL    RTC in 1 year    Patient seen and discussed with Dr. Olu Guerin MD, MPH  PGY-V  Cardiovascular Disease Fellow

## 2018-07-17 ENCOUNTER — CLINICAL SUPPORT (OUTPATIENT)
Dept: ELECTROPHYSIOLOGY | Facility: CLINIC | Age: 78
End: 2018-07-17
Attending: INTERNAL MEDICINE
Payer: MEDICARE

## 2018-07-17 DIAGNOSIS — Z95.0 PACEMAKER: ICD-10-CM

## 2018-07-17 DIAGNOSIS — I42.9 CARDIOMYOPATHY: ICD-10-CM

## 2018-07-17 DIAGNOSIS — I44.2 CHB (COMPLETE HEART BLOCK): ICD-10-CM

## 2018-07-17 DIAGNOSIS — I48.91 ATRIAL FIBRILLATION: ICD-10-CM

## 2018-07-17 PROCEDURE — 93296 REM INTERROG EVL PM/IDS: CPT | Mod: PBBFAC | Performed by: INTERNAL MEDICINE

## 2018-07-17 PROCEDURE — 93294 REM INTERROG EVL PM/LDLS PM: CPT | Mod: ,,, | Performed by: INTERNAL MEDICINE

## 2018-07-23 ENCOUNTER — TELEPHONE (OUTPATIENT)
Dept: ELECTROPHYSIOLOGY | Facility: CLINIC | Age: 78
End: 2018-07-23

## 2018-07-23 NOTE — TELEPHONE ENCOUNTER
----- Message from Shonna Bell MA sent at 7/23/2018 10:58 AM CDT -----  Contact: patient wife called  Please call the patient wife at [phone] she need to talk to you about his device.  Thank you.      Patients wife wanted to make sure he is sending his remote transmissions as scheduled. I informed her that he is.

## 2018-08-06 DIAGNOSIS — I50.22 CHRONIC SYSTOLIC CONGESTIVE HEART FAILURE: ICD-10-CM

## 2018-08-06 DIAGNOSIS — I48.0 PAROXYSMAL ATRIAL FIBRILLATION: ICD-10-CM

## 2018-08-06 RX ORDER — METOPROLOL SUCCINATE 100 MG/1
100 TABLET, EXTENDED RELEASE ORAL DAILY
Qty: 90 TABLET | Refills: 3 | Status: SHIPPED | OUTPATIENT
Start: 2018-08-06 | End: 2019-06-05 | Stop reason: SDUPTHER

## 2018-08-31 ENCOUNTER — OFFICE VISIT (OUTPATIENT)
Dept: FAMILY MEDICINE | Facility: CLINIC | Age: 78
End: 2018-08-31
Payer: MEDICARE

## 2018-08-31 VITALS
HEIGHT: 72 IN | RESPIRATION RATE: 17 BRPM | DIASTOLIC BLOOD PRESSURE: 68 MMHG | OXYGEN SATURATION: 98 % | TEMPERATURE: 98 F | BODY MASS INDEX: 33.39 KG/M2 | WEIGHT: 246.5 LBS | HEART RATE: 76 BPM | SYSTOLIC BLOOD PRESSURE: 118 MMHG

## 2018-08-31 DIAGNOSIS — E78.5 HYPERLIPIDEMIA, UNSPECIFIED HYPERLIPIDEMIA TYPE: Chronic | ICD-10-CM

## 2018-08-31 DIAGNOSIS — I48.0 PAROXYSMAL ATRIAL FIBRILLATION: Primary | ICD-10-CM

## 2018-08-31 DIAGNOSIS — Z79.01 CURRENT USE OF LONG TERM ANTICOAGULATION: ICD-10-CM

## 2018-08-31 DIAGNOSIS — R73.09 ELEVATED RANDOM BLOOD GLUCOSE LEVEL: ICD-10-CM

## 2018-08-31 PROCEDURE — 99214 OFFICE O/P EST MOD 30 MIN: CPT | Mod: S$GLB,,, | Performed by: INTERNAL MEDICINE

## 2018-08-31 PROCEDURE — 99999 PR PBB SHADOW E&M-EST. PATIENT-LVL III: CPT | Mod: PBBFAC,,, | Performed by: INTERNAL MEDICINE

## 2018-08-31 PROCEDURE — 3078F DIAST BP <80 MM HG: CPT | Mod: S$GLB,,, | Performed by: INTERNAL MEDICINE

## 2018-08-31 PROCEDURE — 3074F SYST BP LT 130 MM HG: CPT | Mod: S$GLB,,, | Performed by: INTERNAL MEDICINE

## 2018-08-31 NOTE — PROGRESS NOTES
"Subjective:       Patient ID: Aldo Doll is a 77 y.o. male.    Chief Complaint: Labs Only    Discuss eye exam    HPI: 78 y/o w/ CAD Afib on NOAC presents after recent eye exam was told he had "blood in eye" on left. No vision changes. He is taking apixaban twice per day as prescribed denies hematuria, melena or BRBPR. Feels well exercising daily on treadmill without palpitations or dyspnea no chest pain. No LE swelling. Weight is unchanged      Review of Systems   Constitutional: Negative for activity change, fever and unexpected weight change.   HENT: Negative for congestion, rhinorrhea, sore throat and trouble swallowing.    Eyes: Negative for photophobia and redness.   Respiratory: Negative for cough, chest tightness, shortness of breath and wheezing.    Cardiovascular: Negative for chest pain, palpitations and leg swelling.   Gastrointestinal: Negative for abdominal pain, blood in stool, constipation, diarrhea, nausea and vomiting.   Endocrine: Negative for cold intolerance, heat intolerance and polyuria.   Genitourinary: Negative for decreased urine volume, difficulty urinating, dysuria and urgency.   Musculoskeletal: Negative for arthralgias and back pain.   Skin: Negative for rash.   Neurological: Negative for dizziness, syncope, weakness and headaches.   Psychiatric/Behavioral: Negative for dysphoric mood, sleep disturbance and suicidal ideas.       Objective:     Vitals:    08/31/18 1306   BP: 118/68   BP Location: Right arm   Patient Position: Sitting   BP Method: Large (Manual)   Pulse: 76   Resp: 17   Temp: 98.2 °F (36.8 °C)   TempSrc: Oral   SpO2: 98%   Weight: 111.8 kg (246 lb 7.6 oz)   Height: 6' (1.829 m)          Physical Exam   Constitutional: He is oriented to person, place, and time. He appears well-developed and well-nourished.   HENT:   Head: Normocephalic and atraumatic.   Eyes: Conjunctivae are normal. Pupils are equal, round, and reactive to light.   Neck: Normal range of motion. "   Cardiovascular: Normal rate and regular rhythm. Exam reveals no gallop and no friction rub.   No murmur heard.  Regular rate in 60's   Pulmonary/Chest: Effort normal and breath sounds normal. He has no wheezes. He has no rales.   Abdominal: Soft. Bowel sounds are normal. There is no tenderness. There is no rebound and no guarding.   Musculoskeletal: Normal range of motion. He exhibits no edema or tenderness.   Neurological: He is alert and oriented to person, place, and time. No cranial nerve deficit.   Skin: Skin is warm and dry.   Psychiatric: He has a normal mood and affect.       Assessment and Plan   1. Paroxysmal atrial fibrillation  Rate controlled on noac check cbc  - CBC auto differential; Future    5. Hyperlipidemia, unspecified hyperlipidemia type  Not currently on statin taking omega 3 only he is willing to trial statin check lft's and fasting lipid  - Comprehensive metabolic panel; Future  - Lipid panel; Future    6. Current use of long term anticoagulation  Per cards   - CBC auto differential; Future    7. Elevated random blood glucose level  Request recent optometry note screen for dm with hgba1c  - Hemoglobin A1c; Future

## 2018-09-04 ENCOUNTER — LAB VISIT (OUTPATIENT)
Dept: LAB | Facility: HOSPITAL | Age: 78
End: 2018-09-04
Attending: INTERNAL MEDICINE
Payer: MEDICARE

## 2018-09-04 DIAGNOSIS — E78.5 HYPERLIPIDEMIA, UNSPECIFIED HYPERLIPIDEMIA TYPE: Chronic | ICD-10-CM

## 2018-09-04 DIAGNOSIS — Z79.01 CURRENT USE OF LONG TERM ANTICOAGULATION: ICD-10-CM

## 2018-09-04 DIAGNOSIS — R73.09 ELEVATED RANDOM BLOOD GLUCOSE LEVEL: ICD-10-CM

## 2018-09-04 DIAGNOSIS — I48.0 PAROXYSMAL ATRIAL FIBRILLATION: ICD-10-CM

## 2018-09-04 LAB
ALBUMIN SERPL BCP-MCNC: 4.2 G/DL
ALP SERPL-CCNC: 45 U/L
ALT SERPL W/O P-5'-P-CCNC: 65 U/L
ANION GAP SERPL CALC-SCNC: 6 MMOL/L
AST SERPL-CCNC: 30 U/L
BASOPHILS # BLD AUTO: 0.02 K/UL
BASOPHILS NFR BLD: 0.3 %
BILIRUB SERPL-MCNC: 1.1 MG/DL
BUN SERPL-MCNC: 21 MG/DL
CALCIUM SERPL-MCNC: 9.5 MG/DL
CHLORIDE SERPL-SCNC: 103 MMOL/L
CHOLEST SERPL-MCNC: 215 MG/DL
CHOLEST/HDLC SERPL: 4.3 {RATIO}
CO2 SERPL-SCNC: 30 MMOL/L
CREAT SERPL-MCNC: 0.9 MG/DL
DIFFERENTIAL METHOD: ABNORMAL
EOSINOPHIL # BLD AUTO: 0.3 K/UL
EOSINOPHIL NFR BLD: 4.5 %
ERYTHROCYTE [DISTWIDTH] IN BLOOD BY AUTOMATED COUNT: 14.4 %
EST. GFR  (AFRICAN AMERICAN): >60 ML/MIN/1.73 M^2
EST. GFR  (NON AFRICAN AMERICAN): >60 ML/MIN/1.73 M^2
ESTIMATED AVG GLUCOSE: 100 MG/DL
GLUCOSE SERPL-MCNC: 100 MG/DL
HBA1C MFR BLD HPLC: 5.1 %
HCT VFR BLD AUTO: 40.1 %
HDLC SERPL-MCNC: 50 MG/DL
HDLC SERPL: 23.3 %
HGB BLD-MCNC: 14.2 G/DL
LDLC SERPL CALC-MCNC: 147 MG/DL
LYMPHOCYTES # BLD AUTO: 1.8 K/UL
LYMPHOCYTES NFR BLD: 30.8 %
MCH RBC QN AUTO: 32.1 PG
MCHC RBC AUTO-ENTMCNC: 35.4 G/DL
MCV RBC AUTO: 91 FL
MONOCYTES # BLD AUTO: 0.6 K/UL
MONOCYTES NFR BLD: 9.4 %
NEUTROPHILS # BLD AUTO: 3.2 K/UL
NEUTROPHILS NFR BLD: 54 %
NONHDLC SERPL-MCNC: 165 MG/DL
PLATELET # BLD AUTO: 108 K/UL
PMV BLD AUTO: 9.3 FL
POTASSIUM SERPL-SCNC: 4.5 MMOL/L
PROT SERPL-MCNC: 7.4 G/DL
RBC # BLD AUTO: 4.43 M/UL
SODIUM SERPL-SCNC: 139 MMOL/L
TRIGL SERPL-MCNC: 90 MG/DL
WBC # BLD AUTO: 5.94 K/UL

## 2018-09-04 PROCEDURE — 36415 COLL VENOUS BLD VENIPUNCTURE: CPT | Mod: PN

## 2018-09-04 PROCEDURE — 85025 COMPLETE CBC W/AUTO DIFF WBC: CPT

## 2018-09-04 PROCEDURE — 80061 LIPID PANEL: CPT

## 2018-09-04 PROCEDURE — 83036 HEMOGLOBIN GLYCOSYLATED A1C: CPT

## 2018-09-04 PROCEDURE — 80053 COMPREHEN METABOLIC PANEL: CPT

## 2018-09-05 ENCOUNTER — TELEPHONE (OUTPATIENT)
Dept: FAMILY MEDICINE | Facility: CLINIC | Age: 78
End: 2018-09-05

## 2018-09-05 NOTE — TELEPHONE ENCOUNTER
----- Message from Coral Izquierdo sent at 9/5/2018  2:13 PM CDT -----  Contact: pt  [phone] please call patient with lab results

## 2018-10-09 DIAGNOSIS — I44.2 CHB (COMPLETE HEART BLOCK): ICD-10-CM

## 2018-10-09 DIAGNOSIS — I48.91 ATRIAL FIBRILLATION: ICD-10-CM

## 2018-10-09 DIAGNOSIS — Z95.0 CARDIAC PACEMAKER IN SITU: Primary | ICD-10-CM

## 2018-10-09 DIAGNOSIS — I42.8 OTHER CARDIOMYOPATHIES: ICD-10-CM

## 2018-10-12 ENCOUNTER — TELEPHONE (OUTPATIENT)
Dept: ELECTROPHYSIOLOGY | Facility: CLINIC | Age: 78
End: 2018-10-12

## 2018-10-12 NOTE — TELEPHONE ENCOUNTER
10/12/18 Left pt a vm as a reminder to send an upcoming scheduled manual transmission on 10/15/18.

## 2018-10-15 ENCOUNTER — CLINICAL SUPPORT (OUTPATIENT)
Dept: ELECTROPHYSIOLOGY | Facility: CLINIC | Age: 78
End: 2018-10-15
Attending: INTERNAL MEDICINE
Payer: MEDICARE

## 2018-10-15 ENCOUNTER — TELEPHONE (OUTPATIENT)
Dept: ELECTROPHYSIOLOGY | Facility: CLINIC | Age: 78
End: 2018-10-15

## 2018-10-15 DIAGNOSIS — Z95.0 PACEMAKER: ICD-10-CM

## 2018-10-15 DIAGNOSIS — I44.2 CHB (COMPLETE HEART BLOCK): ICD-10-CM

## 2018-10-15 DIAGNOSIS — I42.9 CARDIOMYOPATHY: ICD-10-CM

## 2018-10-15 DIAGNOSIS — I48.91 ATRIAL FIBRILLATION: ICD-10-CM

## 2018-10-15 PROCEDURE — 93294 REM INTERROG EVL PM/LDLS PM: CPT | Mod: ,,, | Performed by: INTERNAL MEDICINE

## 2018-10-15 PROCEDURE — 93296 REM INTERROG EVL PM/IDS: CPT | Mod: PBBFAC | Performed by: INTERNAL MEDICINE

## 2018-10-26 DIAGNOSIS — I48.91 ATRIAL FIBRILLATION: ICD-10-CM

## 2018-10-29 RX ORDER — APIXABAN 5 MG/1
TABLET, FILM COATED ORAL
Qty: 180 TABLET | Refills: 3 | Status: SHIPPED | OUTPATIENT
Start: 2018-10-29 | End: 2019-06-04 | Stop reason: SDUPTHER

## 2018-12-03 ENCOUNTER — TELEPHONE (OUTPATIENT)
Dept: ADMINISTRATIVE | Facility: HOSPITAL | Age: 78
End: 2018-12-03

## 2019-01-11 DIAGNOSIS — Z95.0 CARDIAC PACEMAKER IN SITU: Primary | ICD-10-CM

## 2019-01-14 ENCOUNTER — CLINICAL SUPPORT (OUTPATIENT)
Dept: CARDIOLOGY | Facility: HOSPITAL | Age: 79
End: 2019-01-14
Attending: INTERNAL MEDICINE
Payer: MEDICARE

## 2019-01-14 ENCOUNTER — TELEPHONE (OUTPATIENT)
Dept: CARDIOLOGY | Facility: HOSPITAL | Age: 79
End: 2019-01-14

## 2019-01-14 DIAGNOSIS — Z95.0 CARDIAC PACEMAKER IN SITU: ICD-10-CM

## 2019-01-14 PROCEDURE — 93296 REM INTERROG EVL PM/IDS: CPT

## 2019-01-29 ENCOUNTER — OFFICE VISIT (OUTPATIENT)
Dept: FAMILY MEDICINE | Facility: CLINIC | Age: 79
End: 2019-01-29
Payer: MEDICARE

## 2019-01-29 VITALS
WEIGHT: 248.19 LBS | HEART RATE: 66 BPM | RESPIRATION RATE: 20 BRPM | HEIGHT: 72 IN | SYSTOLIC BLOOD PRESSURE: 118 MMHG | TEMPERATURE: 99 F | DIASTOLIC BLOOD PRESSURE: 68 MMHG | BODY MASS INDEX: 33.62 KG/M2 | OXYGEN SATURATION: 97 %

## 2019-01-29 DIAGNOSIS — J06.9 VIRAL UPPER RESPIRATORY TRACT INFECTION: Primary | ICD-10-CM

## 2019-01-29 PROCEDURE — 1101F PT FALLS ASSESS-DOCD LE1/YR: CPT | Mod: S$GLB,,, | Performed by: NURSE PRACTITIONER

## 2019-01-29 PROCEDURE — 3078F DIAST BP <80 MM HG: CPT | Mod: S$GLB,,, | Performed by: NURSE PRACTITIONER

## 2019-01-29 PROCEDURE — 99214 OFFICE O/P EST MOD 30 MIN: CPT | Mod: S$GLB,,, | Performed by: NURSE PRACTITIONER

## 2019-01-29 PROCEDURE — 99999 PR PBB SHADOW E&M-EST. PATIENT-LVL III: CPT | Mod: PBBFAC,,, | Performed by: NURSE PRACTITIONER

## 2019-01-29 PROCEDURE — 99999 PR PBB SHADOW E&M-EST. PATIENT-LVL III: ICD-10-PCS | Mod: PBBFAC,,, | Performed by: NURSE PRACTITIONER

## 2019-01-29 PROCEDURE — 1101F PR PT FALLS ASSESS DOC 0-1 FALLS W/OUT INJ PAST YR: ICD-10-PCS | Mod: S$GLB,,, | Performed by: NURSE PRACTITIONER

## 2019-01-29 PROCEDURE — 99214 PR OFFICE/OUTPT VISIT, EST, LEVL IV, 30-39 MIN: ICD-10-PCS | Mod: S$GLB,,, | Performed by: NURSE PRACTITIONER

## 2019-01-29 PROCEDURE — 3074F PR MOST RECENT SYSTOLIC BLOOD PRESSURE < 130 MM HG: ICD-10-PCS | Mod: S$GLB,,, | Performed by: NURSE PRACTITIONER

## 2019-01-29 PROCEDURE — 3074F SYST BP LT 130 MM HG: CPT | Mod: S$GLB,,, | Performed by: NURSE PRACTITIONER

## 2019-01-29 PROCEDURE — 3078F PR MOST RECENT DIASTOLIC BLOOD PRESSURE < 80 MM HG: ICD-10-PCS | Mod: S$GLB,,, | Performed by: NURSE PRACTITIONER

## 2019-01-29 RX ORDER — BENZONATATE 200 MG/1
200 CAPSULE ORAL 3 TIMES DAILY PRN
Qty: 60 CAPSULE | Refills: 0 | Status: SHIPPED | OUTPATIENT
Start: 2019-01-29 | End: 2019-02-08

## 2019-01-29 RX ORDER — FLUTICASONE PROPIONATE 50 MCG
2 SPRAY, SUSPENSION (ML) NASAL DAILY
Qty: 15.8 ML | Refills: 0 | Status: SHIPPED | OUTPATIENT
Start: 2019-01-29 | End: 2019-05-03

## 2019-01-29 RX ORDER — DESLORATADINE 5 MG/1
5 TABLET ORAL DAILY
Qty: 30 TABLET | Refills: 11 | Status: SHIPPED | OUTPATIENT
Start: 2019-01-29 | End: 2019-05-03

## 2019-01-29 NOTE — PROGRESS NOTES
Routine Office Visit    Patient Name: Aldo Doll    : 1940  MRN: [id number]    Chief Complaint:  Post-nasal drip, sore throat, cough    Subjective:  Aldo is a 78 y.o. male who presents today for:    1. Post nasal drip, sore throat, cough (productive of white mucus), runny nose, and congestion that started two days ago. 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 He has been using warm salt water and honey with tea for the sore throat. He has not tried anything else. He has not gotten the flu shot and denies it today. Denies f/c/n/v/d, SOB, chest pain, palpitations, but does report that he heard some wheezing yesterday which has resolved.     Past Medical History  Past Medical History:   Diagnosis Date    *Atrial fibrillation     Anticoagulant long-term use     Arthritis     Atrial fibrillation     Cardiomyopathy     CHF (congestive heart failure)     Coronary artery disease     Heart block     Hyperlipidemia     Hypertension        Past Surgical History  Past Surgical History:   Procedure Laterality Date    CHOLECYSTECTOMY      CHOLECYSTECTOMY-LAPAROSCOPIC N/A 10/15/2014    Performed by Tod Leonardo Jr., MD at [facility] OR [address] [place] [address]    HERNIA REPAIR      INSERT / REPLACE / REMOVE PACEMAKER         Family History  Family History   Problem Relation Age of Onset    Cancer Mother         breast    Cancer Sister         breast    Mental illness Son         depression       Social History  Social History     Socioeconomic History    Marital status:      Spouse name: Not on file    Number of children: Not on file    Years of education: Not on file    Highest education level: Not on file   Social Needs    Financial resource strain: Not on file    Food insecurity - worry: Not on file    Food insecurity - inability: Not on file    Transportation needs - medical: Not on file    Transportation needs - non-medical: Not on file   Occupational History     Not on file   Tobacco Use    Smoking status: Never Smoker    Smokeless tobacco: Never Used   Substance and Sexual Activity    Alcohol use: Yes     Alcohol/week: 1.8 oz     Types: 3 Glasses of wine per week     Comment: social    Drug use: No    Sexual activity: Yes     Partners: Female   Other Topics Concern    Not on file   Social History Narrative    Not on file       Current Medications  Current Outpatient Medications on File Prior to Visit   Medication Sig Dispense Refill    ELIQUIS 5 mg Tab TAKE ONE Tablet BY MOUTH TWICE DAILY 180 tablet 3    fish oil-omega-3 fatty acids 300-1,000 mg capsule Take 3 g by mouth once daily.      metoprolol succinate (TOPROL-XL) 100 MG 24 hr tablet Take 1 tablet (100 mg total) by mouth once daily. 90 tablet 3    valsartan (DIOVAN) 160 MG tablet Take 1 tablet (160 mg total) by mouth once daily. 90 tablet 4     Current Facility-Administered Medications on File Prior to Visit   Medication Dose Route Frequency Provider Last Rate Last Dose    levalbuterol nebulizer solution 0.63 mg  0.63 mg Nebulization 1 time in Clinic/HOD Chu Llamas MD           Allergies   Review of patient's allergies indicates:   Allergen Reactions    Pradaxa [dabigatran etexilate] Itching and Rash    Ace inhibitors      Other reaction(s): cough    Chocolate flavor      Other reaction(s): Rash       Review of Systems (Pertinent positives)  Review of Systems   Constitutional: Negative for chills, diaphoresis, fever, malaise/fatigue and weight loss.   HENT: Positive for congestion and sore throat. Negative for ear discharge, ear pain, hearing loss, nosebleeds, sinus pain and tinnitus.         + rhinorrhea   Eyes: Negative.    Respiratory: Positive for cough, sputum production and wheezing. Negative for hemoptysis, shortness of breath and stridor.    Cardiovascular: Negative for chest pain, palpitations, orthopnea, claudication, leg swelling and PND.   Gastrointestinal: Negative for abdominal  pain, diarrhea, heartburn, nausea and vomiting.   Genitourinary: Negative.    Musculoskeletal: Negative.    Neurological: Negative.  Negative for weakness.   Endo/Heme/Allergies: Negative.    Psychiatric/Behavioral: Negative.        /68 (BP Location: Left arm, Patient Position: Sitting, BP Method: Large (Manual))   Pulse 66   Temp 98.5 °F (36.9 °C) (Oral)   Resp 20   Ht 6' (1.829 m)   Wt 112.6 kg (248 lb 3.2 oz)   SpO2 97%   BMI 33.66 kg/m²     Physical Exam   Constitutional: He is oriented to person, place, and time. He appears well-developed and well-nourished. No distress.   HENT:   Head: Normocephalic and atraumatic.   Right Ear: Tympanic membrane, external ear and ear canal normal.   Left Ear: Tympanic membrane, external ear and ear canal normal.   Nose: Mucosal edema and rhinorrhea present. Right sinus exhibits no maxillary sinus tenderness and no frontal sinus tenderness. Left sinus exhibits no maxillary sinus tenderness and no frontal sinus tenderness.   Mouth/Throat: Uvula is midline, oropharynx is clear and moist and mucous membranes are normal.   No cervical LA   Eyes: Conjunctivae and EOM are normal. Pupils are equal, round, and reactive to light.   Neck: Normal range of motion. Neck supple.   Cardiovascular: Normal rate, regular rhythm, normal heart sounds and intact distal pulses. Exam reveals no gallop and no friction rub.   No murmur heard.  Pulmonary/Chest: Effort normal and breath sounds normal. No stridor. No respiratory distress. He has no wheezes. He has no rales. He exhibits no tenderness.   Abdominal: Soft. Bowel sounds are normal. He exhibits no distension and no mass. There is no tenderness. There is no rebound and no guarding. No hernia.   Musculoskeletal: Normal range of motion.   Neurological: He is alert and oriented to person, place, and time.   Skin: Skin is warm and dry. Capillary refill takes less than 2 seconds. He is not diaphoretic.        Assessment/Plan:  Aldo  Sharmila is a 78 y.o. male who presents today for :    Aldo was seen today for sore throat and cough.    Diagnoses and all orders for this visit:    Viral upper respiratory tract infection  -     fluticasone (FLONASE) 50 mcg/actuation nasal spray; 2 sprays (100 mcg total) by Each Nare route once daily.  -     desloratadine (CLARINEX) 5 mg tablet; Take 1 tablet (5 mg total) by mouth once daily.  -     benzonatate (TESSALON) 200 MG capsule; Take 1 capsule (200 mg total) by mouth 3 (three) times daily as needed for Cough.    No bacterial nidus exhibited. He is requesting steroids or an antibiotic. I explained to him the viral nature of the cold and the fact that antibiotics will not help his illness. I also explained that we should avoid PO or IM steroids as they may exacerbate his cardiomyopathy.  Emphasized the importance of warm salt water gargles and honey with tea PRN.  Use tylenol for aches and pains.  Will treat symptomatically, f/u if no relief in one week.        My collaborating physician is Dr. Hang Coleman.

## 2019-01-30 ENCOUNTER — TELEPHONE (OUTPATIENT)
Dept: OTOLARYNGOLOGY | Facility: CLINIC | Age: 79
End: 2019-01-30

## 2019-01-30 NOTE — TELEPHONE ENCOUNTER
----- Message from Sandra elizahenry sent at 1/30/2019  8:49 AM CST -----  Contact: self - [phone]  Pt asking for an appt with Dr. Hetah today for a sore throat, and cough, and fever. She states pt's PCP provided medications to pt but it is not working. Please contact back at earliest convenience.

## 2019-01-30 NOTE — TELEPHONE ENCOUNTER
Explained to patient that we have no availible appt today and do not swab for the flu. Patient will go to urgent care for appt.

## 2019-02-03 ENCOUNTER — HOSPITAL ENCOUNTER (EMERGENCY)
Facility: HOSPITAL | Age: 79
Discharge: HOME | End: 2019-02-03
Attending: INTERNAL MEDICINE
Payer: MEDICARE

## 2019-02-03 VITALS
WEIGHT: 240 LBS | OXYGEN SATURATION: 100 % | DIASTOLIC BLOOD PRESSURE: 74 MMHG | HEIGHT: 72 IN | SYSTOLIC BLOOD PRESSURE: 125 MMHG | HEART RATE: 70 BPM | BODY MASS INDEX: 32.51 KG/M2 | RESPIRATION RATE: 20 BRPM | TEMPERATURE: 98 F

## 2019-02-03 DIAGNOSIS — R06.2 WHEEZE: Primary | ICD-10-CM

## 2019-02-03 DIAGNOSIS — J40 BRONCHITIS: ICD-10-CM

## 2019-02-03 LAB
CTP QC/QA: YES
FLUAV AG NPH QL: NEGATIVE
FLUBV AG NPH QL: NEGATIVE

## 2019-02-03 PROCEDURE — 99284 EMERGENCY DEPT VISIT MOD MDM: CPT | Mod: 25,ER

## 2019-02-03 PROCEDURE — 87804 INFLUENZA ASSAY W/OPTIC: CPT | Mod: ER

## 2019-02-03 PROCEDURE — 96372 THER/PROPH/DIAG INJ SC/IM: CPT | Mod: ER

## 2019-02-03 PROCEDURE — 25000242 PHARM REV CODE 250 ALT 637 W/ HCPCS: Mod: ER | Performed by: NURSE PRACTITIONER

## 2019-02-03 PROCEDURE — 63600175 PHARM REV CODE 636 W HCPCS: Mod: ER | Performed by: INTERNAL MEDICINE

## 2019-02-03 PROCEDURE — 94640 AIRWAY INHALATION TREATMENT: CPT | Mod: ER

## 2019-02-03 RX ORDER — PREDNISONE 50 MG/1
50 TABLET ORAL DAILY
Qty: 5 TABLET | Refills: 0 | Status: SHIPPED | OUTPATIENT
Start: 2019-02-03 | End: 2019-02-08

## 2019-02-03 RX ORDER — ALBUTEROL SULFATE 90 UG/1
1-2 AEROSOL, METERED RESPIRATORY (INHALATION) EVERY 6 HOURS PRN
Qty: 1 INHALER | Refills: 0 | Status: SHIPPED | OUTPATIENT
Start: 2019-02-03 | End: 2019-05-03

## 2019-02-03 RX ORDER — METHYLPREDNISOLONE SOD SUCC 125 MG
125 VIAL (EA) INJECTION
Status: DISCONTINUED | OUTPATIENT
Start: 2019-02-03 | End: 2019-02-03

## 2019-02-03 RX ORDER — IPRATROPIUM BROMIDE AND ALBUTEROL SULFATE 2.5; .5 MG/3ML; MG/3ML
3 SOLUTION RESPIRATORY (INHALATION)
Status: COMPLETED | OUTPATIENT
Start: 2019-02-03 | End: 2019-02-03

## 2019-02-03 RX ORDER — METHYLPREDNISOLONE SOD SUCC 125 MG
125 VIAL (EA) INJECTION
Status: COMPLETED | OUTPATIENT
Start: 2019-02-03 | End: 2019-02-03

## 2019-02-03 RX ORDER — PROMETHAZINE HYDROCHLORIDE AND DEXTROMETHORPHAN HYDROBROMIDE 6.25; 15 MG/5ML; MG/5ML
5 SYRUP ORAL EVERY 6 HOURS PRN
Qty: 60 ML | Refills: 0 | Status: SHIPPED | OUTPATIENT
Start: 2019-02-03 | End: 2019-02-12

## 2019-02-03 RX ADMIN — IPRATROPIUM BROMIDE AND ALBUTEROL SULFATE 3 ML: .5; 3 SOLUTION RESPIRATORY (INHALATION) at 05:02

## 2019-02-03 RX ADMIN — METHYLPREDNISOLONE SODIUM SUCCINATE 125 MG: 125 INJECTION, POWDER, FOR SOLUTION INTRAMUSCULAR; INTRAVENOUS at 06:02

## 2019-02-04 NOTE — ED PROVIDER NOTES
Encounter Date: 2/3/2019       History     Chief Complaint   Patient presents with    Cough     pt reports he has had a cough, congestion and fever x 1 week. pt reports he has been taking tylenol but denies any relief. denies chest pain, SOB, or any other symptoms. NAD at this time    Nasal Congestion    Fever     HPI  Review of patient's allergies indicates:   Allergen Reactions    Pradaxa [dabigatran etexilate] Itching and Rash    Ace inhibitors      Other reaction(s): cough    Chocolate flavor      Other reaction(s): Rash     Past Medical History:   Diagnosis Date    *Atrial fibrillation     Anticoagulant long-term use     Arthritis     Atrial fibrillation     Cardiomyopathy     CHF (congestive heart failure)     Coronary artery disease     Heart block     Hyperlipidemia     Hypertension      Past Surgical History:   Procedure Laterality Date    CHOLECYSTECTOMY      CHOLECYSTECTOMY-LAPAROSCOPIC N/A 10/15/2014    Performed by Tod Leonardo Jr., MD at [facility] OR [address] [place] [address]    HERNIA REPAIR      INSERT / REPLACE / REMOVE PACEMAKER       Family History   Problem Relation Age of Onset    Cancer Mother         breast    Cancer Sister         breast    Mental illness Son         depression     Social History     Tobacco Use    Smoking status: Never Smoker    Smokeless tobacco: Never Used   Substance Use Topics    Alcohol use: Yes     Alcohol/week: 1.8 oz     Types: 3 Glasses of wine per week     Comment: social    Drug use: No     Review of Systems   Constitutional: Negative.  Negative for chills and fever.   HENT: Positive for congestion. Negative for sinus pressure and sore throat.    Eyes: Negative.  Negative for pain and discharge.   Respiratory: Positive for cough and wheezing.    Cardiovascular: Negative.  Negative for chest pain.   Gastrointestinal: Negative.  Negative for abdominal pain, diarrhea, nausea and vomiting.   Genitourinary: Negative.  Negative for dysuria, genital sores,  penile pain, scrotal swelling and testicular pain.   Musculoskeletal: Negative.  Negative for gait problem, joint swelling and neck pain.   Skin: Negative.  Negative for color change and rash.   Allergic/Immunologic: Negative.    Neurological: Negative.    Psychiatric/Behavioral: Negative.  Negative for behavioral problems, self-injury and suicidal ideas. The patient is not hyperactive.    All other systems reviewed and are negative.      Physical Exam     Initial Vitals [02/03/19 1711]   BP Pulse Resp Temp SpO2   (!) 122/53 67 18 98.2 °F (36.8 °C) 97 %      MAP       --         Physical Exam    Nursing note and vitals reviewed.  Constitutional: He appears well-developed and well-nourished. He is not diaphoretic.  Non-toxic appearance. He does not appear ill. No distress.   HENT:   Head: Normocephalic and atraumatic.   Nose: Mucosal edema present. No rhinorrhea. Right sinus exhibits no maxillary sinus tenderness and no frontal sinus tenderness. Left sinus exhibits no maxillary sinus tenderness and no frontal sinus tenderness.   Mouth/Throat: Uvula is midline, oropharynx is clear and moist and mucous membranes are normal. No uvula swelling. No oropharyngeal exudate, posterior oropharyngeal edema, posterior oropharyngeal erythema or tonsillar abscesses.   Eyes: Conjunctivae are normal.   Neck: Normal range of motion.   Cardiovascular: Normal rate, regular rhythm, normal heart sounds and intact distal pulses. Exam reveals no gallop and no friction rub.    No murmur heard.  Pulmonary/Chest: No respiratory distress. He has wheezes. He has no rhonchi. He has no rales. He exhibits no tenderness.   Musculoskeletal: Normal range of motion.   Neurological: He is alert and oriented to person, place, and time.   Skin: Skin is warm and dry. No rash noted.   Psychiatric: He has a normal mood and affect. His behavior is normal. Judgment and thought content normal.         ED Course   Procedures  Labs Reviewed   POCT INFLUENZA A/B           Imaging Results          X-Ray Chest PA And Lateral (Final result)  Result time 02/03/19 17:36:10    Final result by Carlos Post MD (02/03/19 17:36:10)                 Impression:      No acute cardiopulmonary process identified.      Electronically signed by: Carlos Post MD  Date:    02/03/2019  Time:    17:36             Narrative:    EXAMINATION:  XR CHEST PA AND LATERAL    CLINICAL HISTORY:  Bronchitis, not specified as acute or chronic    TECHNIQUE:  PA and lateral views of the chest were performed.    COMPARISON:  June 2015.    FINDINGS:  Left-sided pacer device is seen.  Cardiac silhouette is normal in size.  Lungs are symmetrically expanded.  No evidence of focal consolidative process, pneumothorax, or significant effusion.  No acute osseous abnormality identified.                                 Medical Decision Making:   Initial Assessment:   Wheezing bronchitis  Differential Diagnosis:   Pneumonia, influenza  Clinical Tests:   Lab Tests: Ordered and Reviewed       <> Summary of Lab: Influenza test negative  Radiological Study: Ordered and Reviewed  ED Management:  Solu-Medrol IM and DuoNeb via inhalation given in the ER.  Upon reassessment the patient has no wheezing reports feeling much better.    1) Pt will be discharged home on promethazine DM, prednisone and ProAir  2) Pt instructed to drink plenty of fluids to loosen secretions  3) Pt instructed that he may take over-the-counter Mucinex (NOT DM) as needed  4) Pt instructed to take over-the-counter Tylenol or Motrin for fever/body aches   5) Pt instructed to return to ER as needed if symptoms worsen/fail to improve  6) Pt instructed to follow-up with primary care provider tomorrow  7) Pt verbalized understanding of discharge instructions and treatment plan                        Clinical Impression:   The primary encounter diagnosis was Wheeze. A diagnosis of Bronchitis was also pertinent to this visit.                              Toussaint Battley III, [facility]  02/03/19 1807

## 2019-02-12 ENCOUNTER — OFFICE VISIT (OUTPATIENT)
Dept: FAMILY MEDICINE | Facility: CLINIC | Age: 79
End: 2019-02-12
Payer: MEDICARE

## 2019-02-12 VITALS
OXYGEN SATURATION: 96 % | TEMPERATURE: 98 F | HEIGHT: 72 IN | DIASTOLIC BLOOD PRESSURE: 57 MMHG | RESPIRATION RATE: 17 BRPM | HEART RATE: 89 BPM | SYSTOLIC BLOOD PRESSURE: 115 MMHG | WEIGHT: 250.25 LBS | BODY MASS INDEX: 33.89 KG/M2

## 2019-02-12 DIAGNOSIS — R05.8 POST-VIRAL COUGH SYNDROME: Primary | ICD-10-CM

## 2019-02-12 DIAGNOSIS — I48.0 PAROXYSMAL ATRIAL FIBRILLATION: ICD-10-CM

## 2019-02-12 PROCEDURE — 3078F PR MOST RECENT DIASTOLIC BLOOD PRESSURE < 80 MM HG: ICD-10-PCS | Mod: S$GLB,,, | Performed by: INTERNAL MEDICINE

## 2019-02-12 PROCEDURE — 99499 UNLISTED E&M SERVICE: CPT | Mod: S$GLB,,, | Performed by: INTERNAL MEDICINE

## 2019-02-12 PROCEDURE — 3074F SYST BP LT 130 MM HG: CPT | Mod: S$GLB,,, | Performed by: INTERNAL MEDICINE

## 2019-02-12 PROCEDURE — 99999 PR PBB SHADOW E&M-EST. PATIENT-LVL III: ICD-10-PCS | Mod: PBBFAC,,, | Performed by: INTERNAL MEDICINE

## 2019-02-12 PROCEDURE — 99499 RISK ADDL DX/OHS AUDIT: ICD-10-PCS | Mod: S$GLB,,, | Performed by: INTERNAL MEDICINE

## 2019-02-12 PROCEDURE — 3074F PR MOST RECENT SYSTOLIC BLOOD PRESSURE < 130 MM HG: ICD-10-PCS | Mod: S$GLB,,, | Performed by: INTERNAL MEDICINE

## 2019-02-12 PROCEDURE — 3078F DIAST BP <80 MM HG: CPT | Mod: S$GLB,,, | Performed by: INTERNAL MEDICINE

## 2019-02-12 PROCEDURE — 99999 PR PBB SHADOW E&M-EST. PATIENT-LVL III: CPT | Mod: PBBFAC,,, | Performed by: INTERNAL MEDICINE

## 2019-02-12 PROCEDURE — 99214 OFFICE O/P EST MOD 30 MIN: CPT | Mod: S$GLB,,, | Performed by: INTERNAL MEDICINE

## 2019-02-12 PROCEDURE — 99214 PR OFFICE/OUTPT VISIT, EST, LEVL IV, 30-39 MIN: ICD-10-PCS | Mod: S$GLB,,, | Performed by: INTERNAL MEDICINE

## 2019-02-12 PROCEDURE — 1101F PR PT FALLS ASSESS DOC 0-1 FALLS W/OUT INJ PAST YR: ICD-10-PCS | Mod: S$GLB,,, | Performed by: INTERNAL MEDICINE

## 2019-02-12 PROCEDURE — 1101F PT FALLS ASSESS-DOCD LE1/YR: CPT | Mod: S$GLB,,, | Performed by: INTERNAL MEDICINE

## 2019-02-12 RX ORDER — PREDNISONE 20 MG/1
40 TABLET ORAL DAILY
Qty: 10 TABLET | Refills: 0 | Status: SHIPPED | OUTPATIENT
Start: 2019-02-12 | End: 2019-02-17

## 2019-02-12 NOTE — PROGRESS NOTES
Subjective:       Patient ID: Aldo Doll is a 78 y.o. male.    Chief Complaint: Cough (3 weeks) and Establish Care    Cough    HPI: 79 y/o presents for follow up of cough. Initially started with sore throat congestion and cough productive of mucus three weeks ago. Seen in this clinic by NP prescribed nasal steroid and antihistamine. Had improvement in congestion but continued with cough and sore throat. Became acutely worse one week ago went to ED given IM steriod injection and prescribed cough suppressant. Felt much better for approximately two days. For last five days daily non productive cough. No longer with sore throat (continues to use nasal steroid BID and daily antihistamine) no chagne in cough with PO intake. No nausaea/vomitting. No dyspnea, he continues to work without sensation of palpations or chest pain. No LE swelling no change in cough when lying down. No fevers/chills       Review of Systems   Constitutional: Negative for activity change, appetite change, fatigue, fever and unexpected weight change.   HENT: Negative for ear pain, rhinorrhea and sore throat.    Eyes: Negative for discharge and visual disturbance.   Respiratory: Positive for cough. Negative for chest tightness, shortness of breath and wheezing.    Cardiovascular: Negative for chest pain, palpitations and leg swelling.   Gastrointestinal: Negative for abdominal pain, constipation and diarrhea.   Endocrine: Negative for cold intolerance and heat intolerance.   Genitourinary: Negative for dysuria and hematuria.   Musculoskeletal: Negative for joint swelling and neck stiffness.   Skin: Negative for rash.   Neurological: Negative for dizziness, syncope, weakness and headaches.   Psychiatric/Behavioral: Negative for suicidal ideas.       Objective:     Vitals:    02/12/19 0852   BP: (!) 115/57   BP Location: Right arm   Patient Position: Sitting   Pulse: 89   Resp: 17   Temp: 97.8 °F (36.6 °C)   TempSrc: Oral   SpO2: 96%   Weight: 113.5  kg (250 lb 3.6 oz)   Height: 6' (1.829 m)          Physical Exam   Constitutional: He is oriented to person, place, and time. He appears well-developed and well-nourished.   HENT:   Head: Normocephalic and atraumatic.   Nose: Mucosal edema present. Right sinus exhibits no maxillary sinus tenderness. Left sinus exhibits no maxillary sinus tenderness.   Mouth/Throat: Uvula is midline and mucous membranes are normal. Posterior oropharyngeal erythema present. No oropharyngeal exudate.   Eyes: Conjunctivae are normal. Pupils are equal, round, and reactive to light.   Neck: Normal range of motion.   Cardiovascular: Normal rate and regular rhythm. Exam reveals no gallop and no friction rub.   No murmur heard.  Regular rate in 70's no LE edema   Pulmonary/Chest: Effort normal and breath sounds normal. He has no wheezes. He has no rales.   Abdominal: Soft. Bowel sounds are normal. There is no tenderness. There is no rebound and no guarding.   Musculoskeletal: Normal range of motion. He exhibits no edema or tenderness.   Neurological: He is alert and oriented to person, place, and time. No cranial nerve deficit.   Skin: Skin is warm and dry.   Psychiatric: He has a normal mood and affect.       Assessment and Plan   1. Post-viral cough syndrome  Short steroid course encourage good oral hydration  - predniSONE (DELTASONE) 20 MG tablet; Take 2 tablets (40 mg total) by mouth once daily. for 5 days  Dispense: 10 tablet; Refill: 0    2. Paroxysmal atrial fibrillation  Rate controlled on NOAC

## 2019-02-20 ENCOUNTER — TELEPHONE (OUTPATIENT)
Dept: FAMILY MEDICINE | Facility: CLINIC | Age: 79
End: 2019-02-20

## 2019-02-20 NOTE — TELEPHONE ENCOUNTER
----- Message from Benoit Ayoub sent at 2/19/2019  4:27 PM CST -----  Contact: Aldo [phone]  The patient is requesting to be seen by Dr. Denton tomorrow if possible. The patient reports that he still does not feel any better, from his previous visit on February 12. Please call at your earliest convenience.

## 2019-02-20 NOTE — TELEPHONE ENCOUNTER
Rt pt call regarding getting an apt with Dr Denton , pt wanted an apt for 2/21/19. Dr Denton would be in wound clinic that date . Pt turned down 2/25/19 slot at 4:20pm and didn't want to be seen by any other providers . Informed pt if symptoms worsen to go to ER

## 2019-04-30 NOTE — PROGRESS NOTES
Mr. Doll is a patient of Dr. Monteiro and was last seen in clinic 11/8/2016.      Subjective:   Patient ID:  Aldo Doll is a 78 y.o. male who presents for follow-up of Pacemaker Check  .     HPI:    Mr. Doll is a 78 y.o. male with pAF, AFL (atypical), HTN, NICM, AVB, PPM (2001) here for annual follow up.    Background:      History of paroxysmal asymptomatic afib, high grade AV block, HTN, and NICM.     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  CHADS=2 on coumadin. He reported a rash with pradaxa and xarelto.     He has felt very well while in atypical AFL on prior visits.  Today, NSR with V pacing.    Echo 4/13: EF 55% 6/14: 45-50%. 10/2016: 40%  There may be a signal of decreasing LVEF, so will intensify frequency of echo. If LVEF stays ~40% or worsens, would likely upgrade to biV PPM. At this point, though, there's no sx at all.    Update (05/03/2019):    Today he says he feels well. No cardiac complaints. Mr. Doll denies chest pain with exertion or at rest, palpitations, SOB, JOY, dizziness, or syncope. He says he is very active.    He is currently taking eliquis 5mg BID for stroke prophylaxis and denies significant bleeding episodes. He is currently being treated with metoprolol succinate 100mg daily for HR control.  Kidney function is stable, with a creatinine of 0.9 on 9/2018.    Device Interrogation (5/3/2019) reveals an intrinsic sinus rhythm with CHB (no escape) with stable lead and device function. AHR~2K episodes, burden 24.4%. No AF since  He paces 60% in the RA and 100% in the RV. Estimated battery longevity 3.5 years.     I have personally reviewed the patient's EKG today, which shows APVP at 67bpm. ND interval is 152. QTc is 513.    Recent Cardiac Tests:    2D Echo (3/31/2017):  CONCLUSIONS     1 - Eccentric hypertrophy.     2 - Mildly to moderately depressed  left ventricular systolic function (EF 40-45%).     3 - Normal right ventricular systolic function .     4 - Left ventricular diastolic dysfunction.     5 - Biatrial enlargement.     6 - Mild tricuspid regurgitation.     7 - The estimated PA systolic pressure is 26 mmHg.     Current Outpatient Medications   Medication Sig    albuterol (PROVENTIL/VENTOLIN HFA) 90 mcg/actuation inhaler Inhale 1-2 puffs into the lungs every 6 (six) hours as needed for Wheezing. Rescue    desloratadine (CLARINEX) 5 mg tablet Take 1 tablet (5 mg total) by mouth once daily.    ELIQUIS 5 mg Tab TAKE ONE Tablet BY MOUTH TWICE DAILY    fish oil-omega-3 fatty acids 300-1,000 mg capsule Take 3 g by mouth once daily.    fluticasone (FLONASE) 50 mcg/actuation nasal spray 2 sprays (100 mcg total) by Each Nare route once daily.    metoprolol succinate (TOPROL-XL) 100 MG 24 hr tablet Take 1 tablet (100 mg total) by mouth once daily.    valsartan (DIOVAN) 160 MG tablet Take 1 tablet (160 mg total) by mouth once daily.     Current Facility-Administered Medications   Medication    levalbuterol nebulizer solution 0.63 mg       Review of Systems   Constitution: Negative for malaise/fatigue.   Cardiovascular: Negative for chest pain, dyspnea on exertion, irregular heartbeat, leg swelling and palpitations.   Respiratory: Negative for shortness of breath.    Hematologic/Lymphatic: Negative for bleeding problem.   Skin: Negative for rash.   Musculoskeletal: Negative for myalgias.   Gastrointestinal: Negative for hematemesis, hematochezia and nausea.   Genitourinary: Negative for hematuria.   Neurological: Negative for light-headedness.   Psychiatric/Behavioral: Negative for altered mental status.   Allergic/Immunologic: Negative for persistent infections.     Objective:        /78   Pulse 67   Ht 6' (1.829 m)   Wt 108.9 kg (240 lb)   BMI 32.55 kg/m²     Physical Exam   Constitutional: He is oriented to person, place, and time. He appears  well-developed and well-nourished.   HENT:   Head: Normocephalic.   Nose: Nose normal.   Eyes: Pupils are equal, round, and reactive to light.   Cardiovascular: Normal rate, regular rhythm, S1 normal and S2 normal.   No murmur heard.  Pulses:       Radial pulses are 2+ on the right side, and 2+ on the left side.   Pulmonary/Chest: Breath sounds normal. No respiratory distress.   Device to LUCW.   Abdominal: Normal appearance.   Musculoskeletal: Normal range of motion. He exhibits no edema.   Neurological: He is alert and oriented to person, place, and time.   Skin: Skin is warm and dry. No erythema.   Psychiatric: He has a normal mood and affect. His speech is normal and behavior is normal.   Nursing note and vitals reviewed.    Lab Results   Component Value Date     09/04/2018    K 4.5 09/04/2018    MG 2.4 03/27/2015    BUN 21 09/04/2018    CREATININE 0.9 09/04/2018    ALT 65 (H) 09/04/2018    AST 30 09/04/2018    HGB 14.2 09/04/2018    HCT 40.1 09/04/2018    TSH 1.808 03/27/2015    LDLCALC 147.0 09/04/2018             Assessment:     1. Cardiac pacemaker in situ    2. Current use of long term anticoagulation    3. Paroxysmal atrial fibrillation    4. AV block, complete    5. Cardiomyopathy, unspecified type    6. Essential hypertension      Plan:     In summary, Mr. Doll is a 78 y.o. male with pAF, AFL (atypical), HTN, NICM, AVB, PPM (2001) here for annual follow up.  Mr. Doll is doing well from a device perspective with stable lead and device function. AF burden 24% although none since 10/2018. Asymptomatic. He remains on eliquis for CVA prophylaxis. He is RV pacing 100% (CHB). Will obtain updated echo. He feels great.    Echo  Continue current medication regimen and device settings.   Follow up in device clinic as scheduled.   Follow up in EP clinic in 1 year, sooner as needed.     *A copy of this note has been sent to Dr. Monteiro*    Follow up in about 1 year (around  5/3/2020).    ------------------------------------------------------------------    DEMETRICE Carvalho, NP-C  Cardiac Electrophysiology

## 2019-05-03 ENCOUNTER — TELEPHONE (OUTPATIENT)
Dept: ELECTROPHYSIOLOGY | Facility: CLINIC | Age: 79
End: 2019-05-03

## 2019-05-03 ENCOUNTER — OFFICE VISIT (OUTPATIENT)
Dept: ELECTROPHYSIOLOGY | Facility: CLINIC | Age: 79
End: 2019-05-03
Attending: INTERNAL MEDICINE
Payer: MEDICARE

## 2019-05-03 ENCOUNTER — CLINICAL SUPPORT (OUTPATIENT)
Dept: CARDIOLOGY | Facility: HOSPITAL | Age: 79
End: 2019-05-03
Attending: INTERNAL MEDICINE
Payer: MEDICARE

## 2019-05-03 VITALS
WEIGHT: 240 LBS | HEIGHT: 72 IN | SYSTOLIC BLOOD PRESSURE: 138 MMHG | DIASTOLIC BLOOD PRESSURE: 78 MMHG | BODY MASS INDEX: 32.51 KG/M2 | HEART RATE: 67 BPM

## 2019-05-03 DIAGNOSIS — Z95.0 CARDIAC PACEMAKER IN SITU: Primary | ICD-10-CM

## 2019-05-03 DIAGNOSIS — I42.9 CARDIOMYOPATHY, UNSPECIFIED TYPE: Primary | ICD-10-CM

## 2019-05-03 DIAGNOSIS — I44.2 CHB (COMPLETE HEART BLOCK): ICD-10-CM

## 2019-05-03 DIAGNOSIS — I48.91 ATRIAL FIBRILLATION: ICD-10-CM

## 2019-05-03 DIAGNOSIS — I44.2 AV BLOCK, COMPLETE: ICD-10-CM

## 2019-05-03 DIAGNOSIS — I48.0 PAROXYSMAL ATRIAL FIBRILLATION: ICD-10-CM

## 2019-05-03 DIAGNOSIS — Z95.0 CARDIAC PACEMAKER IN SITU: ICD-10-CM

## 2019-05-03 DIAGNOSIS — Z79.01 CURRENT USE OF LONG TERM ANTICOAGULATION: ICD-10-CM

## 2019-05-03 DIAGNOSIS — I10 ESSENTIAL HYPERTENSION: ICD-10-CM

## 2019-05-03 DIAGNOSIS — I42.8 OTHER CARDIOMYOPATHIES: ICD-10-CM

## 2019-05-03 DIAGNOSIS — I42.9 CARDIOMYOPATHY, UNSPECIFIED TYPE: ICD-10-CM

## 2019-05-03 PROCEDURE — 93005 RHYTHM STRIP: ICD-10-PCS | Mod: S$GLB,,, | Performed by: INTERNAL MEDICINE

## 2019-05-03 PROCEDURE — 99999 PR PBB SHADOW E&M-EST. PATIENT-LVL III: ICD-10-PCS | Mod: PBBFAC,,, | Performed by: NURSE PRACTITIONER

## 2019-05-03 PROCEDURE — 3078F PR MOST RECENT DIASTOLIC BLOOD PRESSURE < 80 MM HG: ICD-10-PCS | Mod: S$GLB,,, | Performed by: NURSE PRACTITIONER

## 2019-05-03 PROCEDURE — 1101F PT FALLS ASSESS-DOCD LE1/YR: CPT | Mod: S$GLB,,, | Performed by: NURSE PRACTITIONER

## 2019-05-03 PROCEDURE — 3078F DIAST BP <80 MM HG: CPT | Mod: S$GLB,,, | Performed by: NURSE PRACTITIONER

## 2019-05-03 PROCEDURE — 93005 ELECTROCARDIOGRAM TRACING: CPT | Mod: S$GLB,,, | Performed by: INTERNAL MEDICINE

## 2019-05-03 PROCEDURE — 99214 PR OFFICE/OUTPT VISIT, EST, LEVL IV, 30-39 MIN: ICD-10-PCS | Mod: S$GLB,,, | Performed by: NURSE PRACTITIONER

## 2019-05-03 PROCEDURE — 93280 PM DEVICE PROGR EVAL DUAL: CPT

## 2019-05-03 PROCEDURE — 99499 UNLISTED E&M SERVICE: CPT | Mod: S$GLB,,, | Performed by: NURSE PRACTITIONER

## 2019-05-03 PROCEDURE — 93010 RHYTHM STRIP: ICD-10-PCS | Mod: S$GLB,,, | Performed by: INTERNAL MEDICINE

## 2019-05-03 PROCEDURE — 99214 OFFICE O/P EST MOD 30 MIN: CPT | Mod: S$GLB,,, | Performed by: NURSE PRACTITIONER

## 2019-05-03 PROCEDURE — 3075F SYST BP GE 130 - 139MM HG: CPT | Mod: S$GLB,,, | Performed by: NURSE PRACTITIONER

## 2019-05-03 PROCEDURE — 93010 ELECTROCARDIOGRAM REPORT: CPT | Mod: S$GLB,,, | Performed by: INTERNAL MEDICINE

## 2019-05-03 PROCEDURE — 99999 PR PBB SHADOW E&M-EST. PATIENT-LVL III: CPT | Mod: PBBFAC,,, | Performed by: NURSE PRACTITIONER

## 2019-05-03 PROCEDURE — 3075F PR MOST RECENT SYSTOLIC BLOOD PRESS GE 130-139MM HG: ICD-10-PCS | Mod: S$GLB,,, | Performed by: NURSE PRACTITIONER

## 2019-05-03 PROCEDURE — 1101F PR PT FALLS ASSESS DOC 0-1 FALLS W/OUT INJ PAST YR: ICD-10-PCS | Mod: S$GLB,,, | Performed by: NURSE PRACTITIONER

## 2019-05-03 PROCEDURE — 99499 RISK ADDL DX/OHS AUDIT: ICD-10-PCS | Mod: S$GLB,,, | Performed by: NURSE PRACTITIONER

## 2019-05-03 NOTE — LETTER
May 3, 2019      Bry Monteiro MD  [address]  [facility] [address]           [facility] - Arrhythmia  [address]  [facility] [address]  Phone: [phone]  Fax: [phone]          Patient: Aldo Doll   MR Number: [id number]   YOB: 1940   Date of Visit: 5/3/2019       Dear Dr. Bry Monteiro:    Thank you for referring Aldo Doll to me for evaluation. Attached you will find relevant portions of my assessment and plan of care.    If you have questions, please do not hesitate to call me. I look forward to following Aldo Doll along with you.    Sincerely,    Carolina Looney NP    Enclosure  CC:  No Recipients    If you would like to receive this communication electronically, please contact [email or web address] or [phone] to request more information on [employer] Link access.    For providers and/or their staff who would like to refer a patient to [facility], please contact us through our one-stop-shop provider referral line, [facility], at [phone].    If you feel you have received this communication in error or would no longer like to receive these types of communications, please e-mail [email or web address]

## 2019-05-22 ENCOUNTER — OFFICE VISIT (OUTPATIENT)
Dept: CARDIOLOGY | Facility: CLINIC | Age: 79
End: 2019-05-22
Payer: MEDICARE

## 2019-05-22 VITALS
WEIGHT: 241.63 LBS | HEIGHT: 72 IN | SYSTOLIC BLOOD PRESSURE: 132 MMHG | HEART RATE: 81 BPM | BODY MASS INDEX: 32.73 KG/M2 | DIASTOLIC BLOOD PRESSURE: 60 MMHG

## 2019-05-22 DIAGNOSIS — Z95.0 CARDIAC PACEMAKER IN SITU: ICD-10-CM

## 2019-05-22 DIAGNOSIS — I10 ESSENTIAL HYPERTENSION: ICD-10-CM

## 2019-05-22 DIAGNOSIS — E78.5 HYPERLIPIDEMIA, UNSPECIFIED HYPERLIPIDEMIA TYPE: Chronic | ICD-10-CM

## 2019-05-22 DIAGNOSIS — I42.9 PRIMARY CARDIOMYOPATHY: Chronic | ICD-10-CM

## 2019-05-22 DIAGNOSIS — I48.0 PAROXYSMAL ATRIAL FIBRILLATION: Primary | ICD-10-CM

## 2019-05-22 PROCEDURE — 3075F SYST BP GE 130 - 139MM HG: CPT | Mod: GC,S$GLB,, | Performed by: INTERNAL MEDICINE

## 2019-05-22 PROCEDURE — 99214 OFFICE O/P EST MOD 30 MIN: CPT | Mod: GC,S$GLB,, | Performed by: INTERNAL MEDICINE

## 2019-05-22 PROCEDURE — 99999 PR PBB SHADOW E&M-EST. PATIENT-LVL III: CPT | Mod: PBBFAC,GC,, | Performed by: INTERNAL MEDICINE

## 2019-05-22 PROCEDURE — 3078F PR MOST RECENT DIASTOLIC BLOOD PRESSURE < 80 MM HG: ICD-10-PCS | Mod: GC,S$GLB,, | Performed by: INTERNAL MEDICINE

## 2019-05-22 PROCEDURE — 99999 PR PBB SHADOW E&M-EST. PATIENT-LVL III: ICD-10-PCS | Mod: PBBFAC,GC,, | Performed by: INTERNAL MEDICINE

## 2019-05-22 PROCEDURE — 1101F PR PT FALLS ASSESS DOC 0-1 FALLS W/OUT INJ PAST YR: ICD-10-PCS | Mod: GC,S$GLB,, | Performed by: INTERNAL MEDICINE

## 2019-05-22 PROCEDURE — 3075F PR MOST RECENT SYSTOLIC BLOOD PRESS GE 130-139MM HG: ICD-10-PCS | Mod: GC,S$GLB,, | Performed by: INTERNAL MEDICINE

## 2019-05-22 PROCEDURE — 1101F PT FALLS ASSESS-DOCD LE1/YR: CPT | Mod: GC,S$GLB,, | Performed by: INTERNAL MEDICINE

## 2019-05-22 PROCEDURE — 99214 PR OFFICE/OUTPT VISIT, EST, LEVL IV, 30-39 MIN: ICD-10-PCS | Mod: GC,S$GLB,, | Performed by: INTERNAL MEDICINE

## 2019-05-22 PROCEDURE — 3078F DIAST BP <80 MM HG: CPT | Mod: GC,S$GLB,, | Performed by: INTERNAL MEDICINE

## 2019-05-22 NOTE — PROGRESS NOTES
Cardiology Clinic Note    5/22/2019    SUBJECTIVE  Aldo Doll is a 78 y.o. 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  He was seen by Dr. Monteiro in Nov 2016 and was transitioned from Coumadin to Eliquis at that time.     Interval history (05/22/2019)  Most recently seen in EP clinic on 5/3/19 and noted to pace RA 60% and %. Echo ordered at that time to monitor LVEF. Otherwise, he reports feeling well.    He denies chest pain, SOB, orthopnea, PND, JOY, palpitations, lightheadedness, syncope, LE edema.    Review of Systems   Constitution: Negative for chills, fever  Cardiovascular: Negative for chest pain dyspnea on exertion, leg swelling, orthopnea, palpitations, paroxysmal nocturnal dyspnea.   Respiratory: Negative for shortness of breath. Negative for cough.    Endocrine: Negative for heat or cold intolerance    Hematologic/Lymphatic: Negative for easy bruising or bleeding    Skin: Negative for color change and rash.   Musculoskeletal: Positive for left hip pain which is chronic  Gastrointestinal: Negative for abdominal pain, nausea, vomiting, diarrhea  Neurological: Negative for dizziness, light-headedness and loss of balance.   Psychiatric/Behavioral: Negative for altered mental status.     OBJECTIVE  Current Outpatient Medications   Medication Sig Dispense Refill    ELIQUIS 5 mg Tab TAKE ONE Tablet BY MOUTH TWICE DAILY 180 tablet 3    fish oil-omega-3 fatty acids 300-1,000 mg capsule Take 2 capsules by mouth once daily.       metoprolol succinate (TOPROL-XL) 100 MG 24 hr tablet Take 1 tablet (100 mg total) by mouth once daily. 90 tablet 3    valsartan (DIOVAN) 160 MG tablet Take 1 tablet (160 mg total) by mouth once daily. 90 tablet 4     Current Facility-Administered Medications   Medication Dose Route  Frequency Provider Last Rate Last Dose    levalbuterol nebulizer solution 0.63 mg  0.63 mg Nebulization 1 time in Clinic/HOD Chu Llamas MD           Past Medical History:   Diagnosis Date    *Atrial fibrillation     Anticoagulant long-term use     Arthritis     Atrial fibrillation     Cardiomyopathy     CHF (congestive heart failure)     Coronary artery disease     Heart block     Hyperlipidemia     Hypertension        Past Surgical History:   Procedure Laterality Date    CHOLECYSTECTOMY      CHOLECYSTECTOMY-LAPAROSCOPIC N/A 10/15/2014    Performed by Tod Leonardo Jr[facility] MD at [facility] OR [address] [place] [address]    HERNIA REPAIR      INSERT / REPLACE / REMOVE PACEMAKER         Review of patient's allergies indicates:   Allergen Reactions    Pradaxa [dabigatran etexilate] Itching and Rash    Ace inhibitors      Other reaction(s): cough    Chocolate flavor      Other reaction(s): Rash       Family History   Problem Relation Age of Onset    Cancer Mother         breast    Cancer Sister         breast    Mental illness Son         depression       Social History     Socioeconomic History    Marital status:      Spouse name: Not on file    Number of children: Not on file    Years of education: Not on file    Highest education level: Not on file   Occupational History    Not on file   Social Needs    Financial resource strain: Not on file    Food insecurity:     Worry: Not on file     Inability: Not on file    Transportation needs:     Medical: Not on file     Non-medical: Not on file   Tobacco Use    Smoking status: Never Smoker    Smokeless tobacco: Never Used   Substance and Sexual Activity    Alcohol use: Yes     Alcohol/week: 1.8 oz     Types: 3 Glasses of wine per week     Comment: social    Drug use: No    Sexual activity: Yes     Partners: Female   Lifestyle    Physical activity:     Days per week: Not on file     Minutes per session: Not on file    Stress: Not on file    Relationships    Social connections:     Talks on phone: Not on file     Gets together: Not on file     Attends Mandaeism service: Not on file     Active member of club or organization: Not on file     Attends meetings of clubs or organizations: Not on file     Relationship status: Not on file   Other Topics Concern    Not on file   Social History Narrative    Not on file       Vitals: /60 (BP Location: Left arm, Patient Position: Sitting, BP Method: Large (Automatic))   Pulse 81   Ht 6' (1.829 m)   Wt 109.6 kg (241 lb 10 oz)   BMI 32.77 kg/m²      Physical Exam  Gen: NAD  Head/Eyes/Ears/Nose: NCAT, MMM, good dentition   Neck: No carotid bruits, no JVD  Lung: Clear to auscultation bilaterally, no wheezes  Heart: Normal S1/S2, regular rate and rhythm. PPM c/d/i. No erythrema or purulence.   Abdomen: Soft, NT/ND, no masses  Extremities: No lower extremity edema.  No wounds or other skin lesions  Skin: Normal color and turgor. No wounds rashes, no petechia, no ecchymoses.   Neuro: AAOx3    Labs  Lab Results   Component Value Date    WBC 5.94 09/04/2018    HGB 14.2 09/04/2018    HCT 40.1 09/04/2018     (L) 09/04/2018    CHOL 215 (H) 09/04/2018    TRIG 90 09/04/2018    HDL 50 09/04/2018    ALT 65 (H) 09/04/2018    AST 30 09/04/2018     09/04/2018    K 4.5 09/04/2018     09/04/2018    CREATININE 0.9 09/04/2018    BUN 21 09/04/2018    CO2 30 (H) 09/04/2018    TSH 1.808 03/27/2015    PSA 1.3 02/23/2015    INR 1.0 11/16/2015    HGBA1C 5.1 09/04/2018      78 y.o. 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    Diagnoses and all orders for this visit:    HTN - Well controlled  - Continue Diovan 160 mg daily and Toprol- mg daily    Mildly depressed EF  - Continue ARB and B-blocker as above  - Check echo, if LVEF decreased from  previous, would consider CRT-P as noted in EP note    Paroxysmal atrial fibrillation  Cardiac pacemaker in situ  Current use of long term anticoagulation  AV block, complete  - On Eliquis, no significant bleeding issues, could continue Eliquis 5 mg BID  - Rate control strategy with Toprol-XL    RTC in 1 year    Patient seen and discussed with Dr. Olu Guerin MD, MPH  PGY-VI  Cardiovascular Disease Fellow

## 2019-05-24 ENCOUNTER — HOSPITAL ENCOUNTER (OUTPATIENT)
Dept: CARDIOLOGY | Facility: CLINIC | Age: 79
Discharge: HOME | End: 2019-05-24
Attending: NURSE PRACTITIONER
Payer: MEDICARE

## 2019-05-24 VITALS
SYSTOLIC BLOOD PRESSURE: 142 MMHG | BODY MASS INDEX: 32.64 KG/M2 | WEIGHT: 241 LBS | HEART RATE: 86 BPM | DIASTOLIC BLOOD PRESSURE: 62 MMHG | HEIGHT: 72 IN

## 2019-05-24 DIAGNOSIS — I44.2 AV BLOCK, COMPLETE: ICD-10-CM

## 2019-05-24 DIAGNOSIS — I42.9 CARDIOMYOPATHY, UNSPECIFIED TYPE: ICD-10-CM

## 2019-05-24 DIAGNOSIS — I48.0 PAROXYSMAL ATRIAL FIBRILLATION: ICD-10-CM

## 2019-05-24 LAB
ASCENDING AORTA: 3.55 CM
AV INDEX (PROSTH): 0.67
AV MEAN GRADIENT: 4.81 MMHG
AV PEAK GRADIENT: 7.84 MMHG
AV VALVE AREA: 3.88 CM2
AV VELOCITY RATIO: 0.71
BSA FOR ECHO PROCEDURE: 2.36 M2
CV ECHO LV RWT: 0.42 CM
DOP CALC AO PEAK VEL: 1.4 M/S
DOP CALC AO VTI: 30.57 CM
DOP CALC LVOT AREA: 5.77 CM2
DOP CALC LVOT DIAMETER: 2.71 CM
DOP CALC LVOT PEAK VEL: 1 M/S
DOP CALC LVOT STROKE VOLUME: 118.7 CM3
DOP CALCLVOT PEAK VEL VTI: 20.59 CM
E WAVE DECELERATION TIME: 163.84 MSEC
E/A RATIO: 1.13
E/E' RATIO: 8
ECHO LV POSTERIOR WALL: 1.16 CM (ref 0.6–1.1)
FRACTIONAL SHORTENING: 21 % (ref 28–44)
INTERVENTRICULAR SEPTUM: 1.08 CM (ref 0.6–1.1)
IVRT: 0.15 MSEC
LA MAJOR: 5.7 CM
LA MINOR: 5.7 CM
LA WIDTH: 4.3 CM
LEFT ATRIUM SIZE: 4 CM
LEFT ATRIUM VOLUME INDEX: 36.1 ML/M2
LEFT ATRIUM VOLUME: 83.33 CM3
LEFT INTERNAL DIMENSION IN SYSTOLE: 4.34 CM (ref 2.1–4)
LEFT VENTRICLE DIASTOLIC VOLUME INDEX: 63.6 ML/M2
LEFT VENTRICLE DIASTOLIC VOLUME: 146.67 ML
LEFT VENTRICLE MASS INDEX: 107.2 G/M2
LEFT VENTRICLE SYSTOLIC VOLUME INDEX: 36.9 ML/M2
LEFT VENTRICLE SYSTOLIC VOLUME: 85 ML
LEFT VENTRICULAR INTERNAL DIMENSION IN DIASTOLE: 5.49 CM (ref 3.5–6)
LEFT VENTRICULAR MASS: 247.22 G
LV LATERAL E/E' RATIO: 7.5
LV SEPTAL E/E' RATIO: 8.57
MV PEAK A VEL: 0.53 M/S
MV PEAK E VEL: 0.6 M/S
PISA TR MAX VEL: 2.36 M/S
PULM VEIN S/D RATIO: 1.29
PV PEAK D VEL: 0.52 M/S
PV PEAK S VEL: 0.67 M/S
RA MAJOR: 5.68 CM
RA PRESSURE: 3 MMHG
RA WIDTH: 4.24 CM
RIGHT VENTRICULAR END-DIASTOLIC DIMENSION: 4.51 CM
RV TISSUE DOPPLER FREE WALL SYSTOLIC VELOCITY 1 (APICAL 4 CHAMBER VIEW): 13.85 M/S
SINUS: 3.53 CM
STJ: 3.36 CM
TDI LATERAL: 0.08
TDI SEPTAL: 0.07
TDI: 0.08
TR MAX PG: 22.28 MMHG
TRICUSPID ANNULAR PLANE SYSTOLIC EXCURSION: 1.84 CM
TV REST PULMONARY ARTERY PRESSURE: 25 MMHG

## 2019-05-24 PROCEDURE — 93306 TTE W/DOPPLER COMPLETE: CPT | Mod: S$GLB,,, | Performed by: INTERNAL MEDICINE

## 2019-05-24 PROCEDURE — 93306 TRANSTHORACIC ECHO (TTE) COMPLETE (CUPID ONLY): ICD-10-PCS | Mod: S$GLB,,, | Performed by: INTERNAL MEDICINE

## 2019-05-27 ENCOUNTER — TELEPHONE (OUTPATIENT)
Dept: ELECTROPHYSIOLOGY | Facility: CLINIC | Age: 79
End: 2019-05-27

## 2019-05-27 NOTE — TELEPHONE ENCOUNTER
----- Message from Albina Francis sent at 5/27/2019  2:17 PM CDT -----  Contact: self      ----- Message -----  From: Sue Flowers MA  Sent: 5/27/2019  11:17 AM  To: Aayush Figueroa Staff    Pt. is calling about results of echo done 5/24. Please call [phone]

## 2019-06-04 DIAGNOSIS — I50.22 CHRONIC SYSTOLIC CONGESTIVE HEART FAILURE: ICD-10-CM

## 2019-06-04 DIAGNOSIS — I48.91 ATRIAL FIBRILLATION: ICD-10-CM

## 2019-06-04 DIAGNOSIS — I48.0 PAROXYSMAL ATRIAL FIBRILLATION: ICD-10-CM

## 2019-06-04 NOTE — TELEPHONE ENCOUNTER
----- Message from Rhonda Lopez sent at 6/4/2019  9:44 AM CDT -----  Contact: pt wife  Pt need a refill on his Valsartan 160 mg and toprol- mg 24 hr tablet  LOV 5/22/19 Dr. Neal Joseph [facility] [address] [place] [address] [phone] (Phone)  [phone] (Fax)    Thanks

## 2019-06-05 RX ORDER — METOPROLOL SUCCINATE 100 MG/1
100 TABLET, EXTENDED RELEASE ORAL DAILY
Qty: 90 TABLET | Refills: 3 | Status: SHIPPED | OUTPATIENT
Start: 2019-06-05 | End: 2020-06-09 | Stop reason: SDUPTHER

## 2019-06-05 RX ORDER — VALSARTAN 160 MG/1
160 TABLET ORAL DAILY
Qty: 90 TABLET | Refills: 4 | Status: ON HOLD | OUTPATIENT
Start: 2019-06-05 | End: 2020-09-18 | Stop reason: SDUPTHER

## 2020-01-06 DIAGNOSIS — I10 ESSENTIAL HYPERTENSION: ICD-10-CM

## 2020-02-18 ENCOUNTER — OFFICE VISIT (OUTPATIENT)
Dept: FAMILY MEDICINE | Facility: CLINIC | Age: 80
End: 2020-02-18
Payer: MEDICARE

## 2020-02-18 VITALS
SYSTOLIC BLOOD PRESSURE: 129 MMHG | HEIGHT: 72 IN | BODY MASS INDEX: 33.05 KG/M2 | TEMPERATURE: 97 F | OXYGEN SATURATION: 95 % | RESPIRATION RATE: 17 BRPM | WEIGHT: 244 LBS | HEART RATE: 96 BPM | DIASTOLIC BLOOD PRESSURE: 79 MMHG

## 2020-02-18 DIAGNOSIS — I10 HYPERTENSION, ESSENTIAL: ICD-10-CM

## 2020-02-18 DIAGNOSIS — M16.0 PRIMARY OSTEOARTHRITIS OF BOTH HIPS: ICD-10-CM

## 2020-02-18 DIAGNOSIS — I42.9 CARDIOMYOPATHY, UNSPECIFIED TYPE: ICD-10-CM

## 2020-02-18 DIAGNOSIS — Z79.01 CURRENT USE OF LONG TERM ANTICOAGULATION: ICD-10-CM

## 2020-02-18 DIAGNOSIS — Z12.5 SCREENING FOR PROSTATE CANCER: ICD-10-CM

## 2020-02-18 DIAGNOSIS — I48.0 PAROXYSMAL ATRIAL FIBRILLATION: Primary | ICD-10-CM

## 2020-02-18 PROCEDURE — 1101F PR PT FALLS ASSESS DOC 0-1 FALLS W/OUT INJ PAST YR: ICD-10-PCS | Mod: S$GLB,,, | Performed by: INTERNAL MEDICINE

## 2020-02-18 PROCEDURE — 99999 PR PBB SHADOW E&M-EST. PATIENT-LVL IV: CPT | Mod: PBBFAC,,, | Performed by: INTERNAL MEDICINE

## 2020-02-18 PROCEDURE — 3074F SYST BP LT 130 MM HG: CPT | Mod: S$GLB,,, | Performed by: INTERNAL MEDICINE

## 2020-02-18 PROCEDURE — 3078F PR MOST RECENT DIASTOLIC BLOOD PRESSURE < 80 MM HG: ICD-10-PCS | Mod: S$GLB,,, | Performed by: INTERNAL MEDICINE

## 2020-02-18 PROCEDURE — 3078F DIAST BP <80 MM HG: CPT | Mod: S$GLB,,, | Performed by: INTERNAL MEDICINE

## 2020-02-18 PROCEDURE — 1101F PT FALLS ASSESS-DOCD LE1/YR: CPT | Mod: S$GLB,,, | Performed by: INTERNAL MEDICINE

## 2020-02-18 PROCEDURE — 1126F PR PAIN SEVERITY QUANTIFIED, NO PAIN PRESENT: ICD-10-PCS | Mod: S$GLB,,, | Performed by: INTERNAL MEDICINE

## 2020-02-18 PROCEDURE — 99999 PR PBB SHADOW E&M-EST. PATIENT-LVL IV: ICD-10-PCS | Mod: PBBFAC,,, | Performed by: INTERNAL MEDICINE

## 2020-02-18 PROCEDURE — 1159F PR MEDICATION LIST DOCUMENTED IN MEDICAL RECORD: ICD-10-PCS | Mod: S$GLB,,, | Performed by: INTERNAL MEDICINE

## 2020-02-18 PROCEDURE — 99214 OFFICE O/P EST MOD 30 MIN: CPT | Mod: S$GLB,,, | Performed by: INTERNAL MEDICINE

## 2020-02-18 PROCEDURE — 99214 PR OFFICE/OUTPT VISIT, EST, LEVL IV, 30-39 MIN: ICD-10-PCS | Mod: S$GLB,,, | Performed by: INTERNAL MEDICINE

## 2020-02-18 PROCEDURE — 1126F AMNT PAIN NOTED NONE PRSNT: CPT | Mod: S$GLB,,, | Performed by: INTERNAL MEDICINE

## 2020-02-18 PROCEDURE — 1159F MED LIST DOCD IN RCRD: CPT | Mod: S$GLB,,, | Performed by: INTERNAL MEDICINE

## 2020-02-18 PROCEDURE — 3074F PR MOST RECENT SYSTOLIC BLOOD PRESSURE < 130 MM HG: ICD-10-PCS | Mod: S$GLB,,, | Performed by: INTERNAL MEDICINE

## 2020-02-18 NOTE — PROGRESS NOTES
"Subjective:       Patient ID: Aldo Doll is a 79 y.o. male.    Chief Complaint: Establish Care; Follow-up (discuss health); and Hip Injury (L side hip / )    Discuss hip surgery    HPI: 78 y/o w/ h/o complete heart block s/p PPM afib on noac presents to discuss hip surgery. He went to outside orthopedist for right knee pain one month ago. After injecting his knee he reports having xrays of his hip. He reports being told if he did not have hip replacement now he would be too old in a "couple of years". He has long history of intermittent hip stiffness relates primarilyw hen driving for extended period. He is able to use an elipitical and travels frequently without discomfort. He is not taking nsaids. Rarely uses apap for joint pain. "I have no pain". No falls no sensation of joint giving out. He presents today to discuss need for surgery. He would like to see Dr. Merchant whom he has seen in past for his hip.     Regarding cardiac no problems with shortness of rbeath. Rare ankle swelling if on feet for long period of time no orthopnea or PND no sensation of heart racing or palpitations. No melena or BRPBR.     Review of Systems   Constitutional: Negative for activity change, appetite change, fatigue, fever and unexpected weight change.   HENT: Negative for ear pain, rhinorrhea and sore throat.    Eyes: Negative for discharge and visual disturbance.   Respiratory: Negative for chest tightness, shortness of breath and wheezing.    Cardiovascular: Negative for chest pain, palpitations and leg swelling.   Gastrointestinal: Negative for abdominal pain, constipation and diarrhea.   Endocrine: Negative for cold intolerance and heat intolerance.   Genitourinary: Negative for dysuria and hematuria.   Musculoskeletal: Negative for joint swelling and neck stiffness.   Skin: Negative for rash.   Neurological: Negative for dizziness, syncope, weakness and headaches.   Psychiatric/Behavioral: Negative for suicidal ideas.     "   Objective:     Vitals:    02/18/20 1536 02/18/20 1557   BP: 129/79    BP Location: Right arm    Patient Position: Sitting    Pulse: 96    Resp: 17    Temp: 97 °F (36.1 °C)    TempSrc: Oral    SpO2: 95%    Weight: 87.3 kg (192 lb 7.4 oz) 110.7 kg (244 lb)   Height: 6' (1.829 m)           Physical Exam   Constitutional: He is oriented to person, place, and time. He appears well-developed and well-nourished.   HENT:   Head: Normocephalic and atraumatic.   Eyes: Conjunctivae are normal. No scleral icterus.   Neck: Normal range of motion.   Cardiovascular: Normal rate and regular rhythm. Exam reveals no gallop and no friction rub.   No murmur heard.  Regular rate in 70's   Pulmonary/Chest: Effort normal and breath sounds normal. He has no wheezes. He has no rales.   Abdominal: Soft. Bowel sounds are normal. There is no tenderness. There is no rebound and no guarding.   Musculoskeletal: Normal range of motion. He exhibits no edema or tenderness.   Neurological: He is alert and oriented to person, place, and time. No cranial nerve deficit.   Skin: Skin is warm and dry.   Psychiatric: He has a normal mood and affect.       Assessment and Plan   1. Paroxysmal atrial fibrillation  Rate controled clinically sinus today labs for blood coutn and renal function  - CBC auto differential; Future  - Comprehensive metabolic panel; Future  - Lipid panel; Future  - TSH; Future    2. Cardiomyopathy, unspecified type  Clinically euvolemic continue arb and beta blocker    3. Current use of long term anticoagulation  Per surgical request inr  - Protime-INR; Future    4. Primary osteoarthritis of both hips  Discussed with patient that given his limited symptoms and overall good mobility, I am not sure what deficits surgery would address. He requested a second opinion from Dr. Merchant, referral sent  - Ambulatory referral/consult to Orthopedics; Future    5. Hypertension, essential  At goal continue current medicaitons    6. Screening for  prostate cancer  psa today no symptoms  - PSA, Screening; Future

## 2020-02-21 ENCOUNTER — LAB VISIT (OUTPATIENT)
Dept: LAB | Facility: HOSPITAL | Age: 80
End: 2020-02-21
Attending: INTERNAL MEDICINE
Payer: MEDICARE

## 2020-02-21 DIAGNOSIS — I48.0 PAROXYSMAL ATRIAL FIBRILLATION: ICD-10-CM

## 2020-02-21 DIAGNOSIS — Z79.01 CURRENT USE OF LONG TERM ANTICOAGULATION: ICD-10-CM

## 2020-02-21 DIAGNOSIS — Z12.5 SCREENING FOR PROSTATE CANCER: ICD-10-CM

## 2020-02-21 LAB
ALBUMIN SERPL BCP-MCNC: 4.2 G/DL (ref 3.5–5.2)
ALP SERPL-CCNC: 43 U/L (ref 55–135)
ALT SERPL W/O P-5'-P-CCNC: 43 U/L (ref 10–44)
ANION GAP SERPL CALC-SCNC: 8 MMOL/L (ref 8–16)
AST SERPL-CCNC: 24 U/L (ref 10–40)
BASOPHILS # BLD AUTO: 0.04 K/UL (ref 0–0.2)
BASOPHILS NFR BLD: 0.8 % (ref 0–1.9)
BILIRUB SERPL-MCNC: 1.1 MG/DL (ref 0.1–1)
BUN SERPL-MCNC: 16 MG/DL (ref 8–23)
CALCIUM SERPL-MCNC: 10 MG/DL (ref 8.7–10.5)
CHLORIDE SERPL-SCNC: 103 MMOL/L (ref 95–110)
CHOLEST SERPL-MCNC: 256 MG/DL (ref 120–199)
CHOLEST/HDLC SERPL: 4.3 {RATIO} (ref 2–5)
CO2 SERPL-SCNC: 30 MMOL/L (ref 23–29)
COMPLEXED PSA SERPL-MCNC: 1.7 NG/ML (ref 0–4)
CREAT SERPL-MCNC: 0.8 MG/DL (ref 0.5–1.4)
DIFFERENTIAL METHOD: ABNORMAL
EOSINOPHIL # BLD AUTO: 0.4 K/UL (ref 0–0.5)
EOSINOPHIL NFR BLD: 7.5 % (ref 0–8)
ERYTHROCYTE [DISTWIDTH] IN BLOOD BY AUTOMATED COUNT: 14.5 % (ref 11.5–14.5)
EST. GFR  (AFRICAN AMERICAN): >60 ML/MIN/1.73 M^2
EST. GFR  (NON AFRICAN AMERICAN): >60 ML/MIN/1.73 M^2
GLUCOSE SERPL-MCNC: 108 MG/DL (ref 70–110)
HCT VFR BLD AUTO: 42.9 % (ref 40–54)
HDLC SERPL-MCNC: 60 MG/DL (ref 40–75)
HDLC SERPL: 23.4 % (ref 20–50)
HGB BLD-MCNC: 14.5 G/DL (ref 14–18)
IMM GRANULOCYTES # BLD AUTO: 0.06 K/UL (ref 0–0.04)
IMM GRANULOCYTES NFR BLD AUTO: 1.1 % (ref 0–0.5)
INR PPP: 1 (ref 0.8–1.2)
LDLC SERPL CALC-MCNC: 167.8 MG/DL (ref 63–159)
LYMPHOCYTES # BLD AUTO: 1.7 K/UL (ref 1–4.8)
LYMPHOCYTES NFR BLD: 32.9 % (ref 18–48)
MCH RBC QN AUTO: 31.6 PG (ref 27–31)
MCHC RBC AUTO-ENTMCNC: 33.8 G/DL (ref 32–36)
MCV RBC AUTO: 94 FL (ref 82–98)
MONOCYTES # BLD AUTO: 0.5 K/UL (ref 0.3–1)
MONOCYTES NFR BLD: 9.2 % (ref 4–15)
NEUTROPHILS # BLD AUTO: 2.5 K/UL (ref 1.8–7.7)
NEUTROPHILS NFR BLD: 48.5 % (ref 38–73)
NONHDLC SERPL-MCNC: 196 MG/DL
NRBC BLD-RTO: 0 /100 WBC
PLATELET # BLD AUTO: 120 K/UL (ref 150–350)
PMV BLD AUTO: 9 FL (ref 9.2–12.9)
POTASSIUM SERPL-SCNC: 4.7 MMOL/L (ref 3.5–5.1)
PROT SERPL-MCNC: 7.4 G/DL (ref 6–8.4)
PROTHROMBIN TIME: 10.7 SEC (ref 9–12.5)
RBC # BLD AUTO: 4.59 M/UL (ref 4.6–6.2)
SODIUM SERPL-SCNC: 141 MMOL/L (ref 136–145)
TRIGL SERPL-MCNC: 141 MG/DL (ref 30–150)
TSH SERPL DL<=0.005 MIU/L-ACNC: 1.43 UIU/ML (ref 0.4–4)
WBC # BLD AUTO: 5.23 K/UL (ref 3.9–12.7)

## 2020-02-21 PROCEDURE — 85610 PROTHROMBIN TIME: CPT

## 2020-02-21 PROCEDURE — 80053 COMPREHEN METABOLIC PANEL: CPT

## 2020-02-21 PROCEDURE — 36415 COLL VENOUS BLD VENIPUNCTURE: CPT | Mod: PN

## 2020-02-21 PROCEDURE — 80061 LIPID PANEL: CPT

## 2020-02-21 PROCEDURE — 85025 COMPLETE CBC W/AUTO DIFF WBC: CPT

## 2020-02-21 PROCEDURE — 84443 ASSAY THYROID STIM HORMONE: CPT

## 2020-02-21 PROCEDURE — 84153 ASSAY OF PSA TOTAL: CPT

## 2020-03-02 ENCOUNTER — TELEPHONE (OUTPATIENT)
Dept: FAMILY MEDICINE | Facility: CLINIC | Age: 80
End: 2020-03-02

## 2020-03-10 ENCOUNTER — TELEPHONE (OUTPATIENT)
Dept: FAMILY MEDICINE | Facility: CLINIC | Age: 80
End: 2020-03-10

## 2020-03-10 NOTE — TELEPHONE ENCOUNTER
----- Message from Stacey Colon sent at 3/10/2020 11:18 AM CDT -----  Contact: Self [phone] or [phone]  Type:  Test Results    Who Called: Self    Name of Test (Lab/Mammo/Etc): labs    Date of Test: 2/21    Where the test was performed: [facility]    Would the patient rather a call back or a response via [facility]? Call back    Best Call Back Number: [phone] or [phone]    For Clinical Team:Has the provider reviewed the results?

## 2020-03-16 ENCOUNTER — TELEPHONE (OUTPATIENT)
Dept: CARDIOLOGY | Facility: HOSPITAL | Age: 80
End: 2020-03-16

## 2020-03-26 ENCOUNTER — CLINICAL SUPPORT (OUTPATIENT)
Dept: CARDIOLOGY | Facility: HOSPITAL | Age: 80
End: 2020-03-26
Payer: MEDICARE

## 2020-03-26 PROCEDURE — 93294 CARDIAC DEVICE CHECK - REMOTE: ICD-10-PCS | Mod: ,,, | Performed by: INTERNAL MEDICINE

## 2020-03-26 PROCEDURE — 93294 REM INTERROG EVL PM/LDLS PM: CPT | Mod: ,,, | Performed by: INTERNAL MEDICINE

## 2020-03-26 PROCEDURE — 93296 REM INTERROG EVL PM/IDS: CPT | Performed by: INTERNAL MEDICINE

## 2020-05-26 ENCOUNTER — OFFICE VISIT (OUTPATIENT)
Dept: FAMILY MEDICINE | Facility: CLINIC | Age: 80
End: 2020-05-26
Payer: MEDICARE

## 2020-05-26 VITALS
DIASTOLIC BLOOD PRESSURE: 76 MMHG | SYSTOLIC BLOOD PRESSURE: 134 MMHG | OXYGEN SATURATION: 95 % | TEMPERATURE: 98 F | HEART RATE: 91 BPM | RESPIRATION RATE: 20 BRPM | WEIGHT: 257.94 LBS | BODY MASS INDEX: 34.94 KG/M2 | HEIGHT: 72 IN

## 2020-05-26 DIAGNOSIS — J02.9 PHARYNGITIS, UNSPECIFIED ETIOLOGY: Primary | ICD-10-CM

## 2020-05-26 LAB
CTP QC/QA: YES
S PYO RRNA THROAT QL PROBE: NEGATIVE

## 2020-05-26 PROCEDURE — 99999 PR PBB SHADOW E&M-EST. PATIENT-LVL III: ICD-10-PCS | Mod: PBBFAC,,, | Performed by: NURSE PRACTITIONER

## 2020-05-26 PROCEDURE — 1101F PT FALLS ASSESS-DOCD LE1/YR: CPT | Mod: S$GLB,,, | Performed by: NURSE PRACTITIONER

## 2020-05-26 PROCEDURE — 3078F PR MOST RECENT DIASTOLIC BLOOD PRESSURE < 80 MM HG: ICD-10-PCS | Mod: S$GLB,,, | Performed by: NURSE PRACTITIONER

## 2020-05-26 PROCEDURE — 1159F PR MEDICATION LIST DOCUMENTED IN MEDICAL RECORD: ICD-10-PCS | Mod: S$GLB,,, | Performed by: NURSE PRACTITIONER

## 2020-05-26 PROCEDURE — 3075F PR MOST RECENT SYSTOLIC BLOOD PRESS GE 130-139MM HG: ICD-10-PCS | Mod: S$GLB,,, | Performed by: NURSE PRACTITIONER

## 2020-05-26 PROCEDURE — 87880 STREP A ASSAY W/OPTIC: CPT | Mod: QW,S$GLB,, | Performed by: NURSE PRACTITIONER

## 2020-05-26 PROCEDURE — 1159F MED LIST DOCD IN RCRD: CPT | Mod: S$GLB,,, | Performed by: NURSE PRACTITIONER

## 2020-05-26 PROCEDURE — 99999 PR PBB SHADOW E&M-EST. PATIENT-LVL III: CPT | Mod: PBBFAC,,, | Performed by: NURSE PRACTITIONER

## 2020-05-26 PROCEDURE — 87880 POCT RAPID STREP A: ICD-10-PCS | Mod: QW,S$GLB,, | Performed by: NURSE PRACTITIONER

## 2020-05-26 PROCEDURE — 99214 OFFICE O/P EST MOD 30 MIN: CPT | Mod: 25,S$GLB,, | Performed by: NURSE PRACTITIONER

## 2020-05-26 PROCEDURE — 1126F AMNT PAIN NOTED NONE PRSNT: CPT | Mod: S$GLB,,, | Performed by: NURSE PRACTITIONER

## 2020-05-26 PROCEDURE — 99214 PR OFFICE/OUTPT VISIT, EST, LEVL IV, 30-39 MIN: ICD-10-PCS | Mod: 25,S$GLB,, | Performed by: NURSE PRACTITIONER

## 2020-05-26 PROCEDURE — 1126F PR PAIN SEVERITY QUANTIFIED, NO PAIN PRESENT: ICD-10-PCS | Mod: S$GLB,,, | Performed by: NURSE PRACTITIONER

## 2020-05-26 PROCEDURE — U0003 INFECTIOUS AGENT DETECTION BY NUCLEIC ACID (DNA OR RNA); SEVERE ACUTE RESPIRATORY SYNDROME CORONAVIRUS 2 (SARS-COV-2) (CORONAVIRUS DISEASE [COVID-19]), AMPLIFIED PROBE TECHNIQUE, MAKING USE OF HIGH THROUGHPUT TECHNOLOGIES AS DESCRIBED BY CMS-2020-01-R: HCPCS

## 2020-05-26 PROCEDURE — 3075F SYST BP GE 130 - 139MM HG: CPT | Mod: S$GLB,,, | Performed by: NURSE PRACTITIONER

## 2020-05-26 PROCEDURE — 3078F DIAST BP <80 MM HG: CPT | Mod: S$GLB,,, | Performed by: NURSE PRACTITIONER

## 2020-05-26 PROCEDURE — 1101F PR PT FALLS ASSESS DOC 0-1 FALLS W/OUT INJ PAST YR: ICD-10-PCS | Mod: S$GLB,,, | Performed by: NURSE PRACTITIONER

## 2020-05-26 NOTE — PROGRESS NOTES
Routine Office Visit    Patient Name: Aldo Doll    : 1940  MRN: [id number]    Chief Complaint:  Sore throat    Subjective:  Aldo is a 79 y.o. male who presents today for:    1.  Sore throat v- patient reports today for evaluation of a sore throat.  He states that 4 days ago he was working at his job outside and got wet from the rain.  He states that he continue to work for the next hour and then went inside.  Yesterday he developed a sore throat as well as an itchy throat in as why he presents today.  He denies any associated ENT symptoms denies any runny nose, nasal congestion, postnasal drip, or sinus congestion.  Denies any coughing, shortness of breath, chest pain, wheezing, or palpitations.  Denies any nausea, vomiting, diarrhea, or abdominal pain.  He took his usual medicines yesterday and this morning any also tried zinc today for the symptoms without significant relief.  He denies any recent sick contacts.  He does not smoke.    Past Medical History  Past Medical History:   Diagnosis Date    *Atrial fibrillation     Anticoagulant long-term use     Arthritis     Atrial fibrillation     Cardiomyopathy     CHF (congestive heart failure)     Coronary artery disease     Heart block     Hyperlipidemia     Hypertension        Past Surgical History  Past Surgical History:   Procedure Laterality Date    CHOLECYSTECTOMY      HERNIA REPAIR      INSERT / REPLACE / REMOVE PACEMAKER         Family History  Family History   Problem Relation Age of Onset    Cancer Mother         breast    Cancer Sister         breast    Mental illness Son         depression       Social History  Social History     Socioeconomic History    Marital status:      Spouse name: Not on file    Number of children: Not on file    Years of education: Not on file    Highest education level: Not on file   Occupational History    Not on file   Social Needs    Financial resource strain: Not on file    Food  insecurity:     Worry: Not on file     Inability: Not on file    Transportation needs:     Medical: Not on file     Non-medical: Not on file   Tobacco Use    Smoking status: Never Smoker    Smokeless tobacco: Never Used   Substance and Sexual Activity    Alcohol use: Yes     Alcohol/week: 3.0 standard drinks     Types: 3 Glasses of wine per week     Comment: social    Drug use: No    Sexual activity: Yes     Partners: Female   Lifestyle    Physical activity:     Days per week: Not on file     Minutes per session: Not on file    Stress: Not on file   Relationships    Social connections:     Talks on phone: Not on file     Gets together: Not on file     Attends Restorationism service: Not on file     Active member of club or organization: Not on file     Attends meetings of clubs or organizations: Not on file     Relationship status: Not on file   Other Topics Concern    Not on file   Social History Narrative    Not on file       Current Medications  Current Outpatient Medications on File Prior to Visit   Medication Sig Dispense Refill    apixaban (ELIQUIS) 5 mg Tab Take 1 tablet (5 mg total) by mouth 2 (two) times daily. 180 tablet 3    fish oil-omega-3 fatty acids 300-1,000 mg capsule Take 2 capsules by mouth once daily.       metoprolol succinate (TOPROL-XL) 100 MG 24 hr tablet Take 1 tablet (100 mg total) by mouth once daily. 90 tablet 3    valsartan (DIOVAN) 160 MG tablet Take 1 tablet (160 mg total) by mouth once daily. 90 tablet 4     No current facility-administered medications on file prior to visit.        Allergies   Review of patient's allergies indicates:   Allergen Reactions    Pradaxa [dabigatran etexilate] Itching and Rash    Ace inhibitors      Other reaction(s): cough    Chocolate flavor      Other reaction(s): Rash       Review of Systems (Pertinent positives)  Review of Systems   Constitutional: Negative.  Negative for chills and fever.   HENT: Positive for sore throat. Negative for  congestion, ear discharge, ear pain, hearing loss, nosebleeds, sinus pain and tinnitus.    Eyes: Negative.    Respiratory: Negative for cough, hemoptysis, sputum production, shortness of breath, wheezing and stridor.    Cardiovascular: Negative for chest pain, palpitations, orthopnea, claudication, leg swelling and PND.   Gastrointestinal: Negative.    Genitourinary: Negative.    Musculoskeletal: Negative.    Skin: Negative.    Neurological: Negative.    Endo/Heme/Allergies: Negative.    Psychiatric/Behavioral: Negative.        /76 (BP Location: Right arm, Patient Position: Sitting, BP Method: Large (Manual))   Pulse 91   Temp 97.7 °F (36.5 °C) (Oral)   Resp 20   Ht 6' (1.829 m)   Wt 117 kg (257 lb 15 oz)   SpO2 95%   BMI 34.98 kg/m²     Physical Exam   Constitutional: He is oriented to person, place, and time. He appears well-developed and well-nourished.  Non-toxic appearance. He does not have a sickly appearance. He does not appear ill. No distress.   HENT:   Head: Normocephalic and atraumatic.   Right Ear: Tympanic membrane, external ear and ear canal normal.   Left Ear: Tympanic membrane, external ear and ear canal normal.   Nose: Nose normal. Right sinus exhibits no maxillary sinus tenderness and no frontal sinus tenderness. Left sinus exhibits no maxillary sinus tenderness and no frontal sinus tenderness.   Mouth/Throat: Uvula is midline, oropharynx is clear and moist and mucous membranes are normal. No oropharyngeal exudate, posterior oropharyngeal edema, posterior oropharyngeal erythema or tonsillar abscesses. Tonsils are 0 on the right. Tonsils are 0 on the left. No tonsillar exudate.   Eyes: Pupils are equal, round, and reactive to light. Conjunctivae and EOM are normal.   Neck: Normal range of motion. Neck supple. No JVD present.   Cardiovascular: Normal rate, regular rhythm, normal heart sounds and intact distal pulses. Exam reveals no gallop and no friction rub.   No murmur  heard.  Clinically euvolemic JVD no lower extremity swelling   Pulmonary/Chest: Effort normal and breath sounds normal. No stridor. No respiratory distress. He has no wheezes. He has no rales. He exhibits no tenderness.   Abdominal: Soft. Bowel sounds are normal. He exhibits no distension and no mass. There is no tenderness. There is no rebound and no guarding. No hernia.   Musculoskeletal: Normal range of motion.   Lymphadenopathy:     He has no cervical adenopathy.   Neurological: He is alert and oriented to person, place, and time.   Skin: Skin is warm and dry. Capillary refill takes less than 2 seconds. He is not diaphoretic.   Vitals reviewed.       Assessment/Plan:  Aldo Doll is a 79 y.o. male who presents today for :    Aldo was seen today for sore throat.    Diagnoses and all orders for this visit:    Pharyngitis, unspecified etiology  -     POCT Rapid Strep A  -     COVID-19 Routine Screening  -     Airborne and Contact and Droplet Isolation Status; Standing  -     Airborne and Contact and Droplet Isolation Status     No evidence of bacterial infection.  Point of care rapid strep test negative.  Patient is requesting Coronavirus screening so I will order this.  Symptoms likely due to virus or allergen.  No bacterial nidus exhibited on exam.  Recommended Tylenol use for symptom benefit as well as warm salt water gargles p.r.n..  Will follow-up with Coronavirus testing.  Patient verbalized understanding of instructions.        This office note has been dictated.  This dictation has been generated using M-Modal Fluency Direct dictation; some phonetic errors may occur.   My collaborating physician is Dr. Hang Coleman.

## 2020-05-27 LAB — SARS-COV-2 RNA RESP QL NAA+PROBE: NOT DETECTED

## 2020-05-29 ENCOUNTER — TELEPHONE (OUTPATIENT)
Dept: FAMILY MEDICINE | Facility: CLINIC | Age: 80
End: 2020-05-29

## 2020-05-29 NOTE — TELEPHONE ENCOUNTER
----- Message from Oneal العراقي NP sent at 5/29/2020  8:50 AM CDT -----  Please call patient and let that his Coronavirus test is negative.  Thank you

## 2020-06-09 DIAGNOSIS — I48.0 PAROXYSMAL ATRIAL FIBRILLATION: ICD-10-CM

## 2020-06-09 DIAGNOSIS — I50.22 CHRONIC SYSTOLIC CONGESTIVE HEART FAILURE: ICD-10-CM

## 2020-06-09 RX ORDER — METOPROLOL SUCCINATE 100 MG/1
100 TABLET, EXTENDED RELEASE ORAL DAILY
Qty: 90 TABLET | Refills: 3 | Status: ON HOLD | OUTPATIENT
Start: 2020-06-09 | End: 2020-09-07 | Stop reason: SDUPTHER

## 2020-07-17 ENCOUNTER — CLINICAL SUPPORT (OUTPATIENT)
Dept: CARDIOLOGY | Facility: HOSPITAL | Age: 80
End: 2020-07-17
Payer: MEDICARE

## 2020-07-17 DIAGNOSIS — Z95.0 PRESENCE OF CARDIAC PACEMAKER: ICD-10-CM

## 2020-07-17 DIAGNOSIS — I44.2 ATRIOVENTRICULAR BLOCK, COMPLETE: ICD-10-CM

## 2020-07-17 PROCEDURE — 93296 REM INTERROG EVL PM/IDS: CPT | Performed by: INTERNAL MEDICINE

## 2020-07-17 PROCEDURE — 93294 REM INTERROG EVL PM/LDLS PM: CPT | Mod: ,,, | Performed by: INTERNAL MEDICINE

## 2020-07-17 PROCEDURE — 93294 CARDIAC DEVICE CHECK - REMOTE: ICD-10-PCS | Mod: ,,, | Performed by: INTERNAL MEDICINE

## 2020-07-24 ENCOUNTER — HOSPITAL ENCOUNTER (EMERGENCY)
Facility: HOSPITAL | Age: 80
Discharge: HOME | End: 2020-07-25
Attending: EMERGENCY MEDICINE
Payer: MEDICARE

## 2020-07-24 DIAGNOSIS — R50.9 FEVER: ICD-10-CM

## 2020-07-24 DIAGNOSIS — I71.20 THORACIC AORTIC ANEURYSM WITHOUT RUPTURE: ICD-10-CM

## 2020-07-24 DIAGNOSIS — R10.13 EPIGASTRIC PAIN: ICD-10-CM

## 2020-07-24 DIAGNOSIS — J18.9 PNEUMONIA OF RIGHT LOWER LOBE DUE TO INFECTIOUS ORGANISM: Primary | ICD-10-CM

## 2020-07-24 LAB
ALBUMIN SERPL BCP-MCNC: 4.2 G/DL (ref 3.5–5.2)
ALP SERPL-CCNC: 64 U/L (ref 55–135)
ALT SERPL W/O P-5'-P-CCNC: 71 U/L (ref 10–44)
ANION GAP SERPL CALC-SCNC: 9 MMOL/L (ref 8–16)
APTT BLDCRRT: 33.6 SEC (ref 21–32)
AST SERPL-CCNC: 48 U/L (ref 10–40)
BACTERIA #/AREA URNS HPF: ABNORMAL /HPF
BASOPHILS # BLD AUTO: 0.01 K/UL (ref 0–0.2)
BASOPHILS NFR BLD: 0.2 % (ref 0–1.9)
BILIRUB SERPL-MCNC: 2 MG/DL (ref 0.1–1)
BILIRUB UR QL STRIP: NEGATIVE
BUN SERPL-MCNC: 16 MG/DL (ref 8–23)
CALCIUM SERPL-MCNC: 9.5 MG/DL (ref 8.7–10.5)
CHLORIDE SERPL-SCNC: 101 MMOL/L (ref 95–110)
CLARITY UR: CLEAR
CO2 SERPL-SCNC: 23 MMOL/L (ref 23–29)
COLOR UR: ABNORMAL
CREAT SERPL-MCNC: 0.9 MG/DL (ref 0.5–1.4)
DIFFERENTIAL METHOD: ABNORMAL
EOSINOPHIL # BLD AUTO: 0.1 K/UL (ref 0–0.5)
EOSINOPHIL NFR BLD: 1.4 % (ref 0–8)
ERYTHROCYTE [DISTWIDTH] IN BLOOD BY AUTOMATED COUNT: 13.3 % (ref 11.5–14.5)
EST. GFR  (AFRICAN AMERICAN): >60 ML/MIN/1.73 M^2
EST. GFR  (NON AFRICAN AMERICAN): >60 ML/MIN/1.73 M^2
GLUCOSE SERPL-MCNC: 131 MG/DL (ref 70–110)
GLUCOSE UR QL STRIP: NEGATIVE
HCT VFR BLD AUTO: 36.8 % (ref 40–54)
HGB BLD-MCNC: 12.8 G/DL (ref 14–18)
HGB UR QL STRIP: ABNORMAL
HYALINE CASTS #/AREA URNS LPF: 0 /LPF
IMM GRANULOCYTES # BLD AUTO: 0.01 K/UL (ref 0–0.04)
IMM GRANULOCYTES NFR BLD AUTO: 0.2 % (ref 0–0.5)
INR PPP: 1 (ref 0.8–1.2)
KETONES UR QL STRIP: NEGATIVE
LACTATE SERPL-SCNC: 1.2 MMOL/L (ref 0.5–2.2)
LEUKOCYTE ESTERASE UR QL STRIP: NEGATIVE
LIPASE SERPL-CCNC: 36 U/L (ref 4–60)
LYMPHOCYTES # BLD AUTO: 0.8 K/UL (ref 1–4.8)
LYMPHOCYTES NFR BLD: 16.4 % (ref 18–48)
MCH RBC QN AUTO: 31.4 PG (ref 27–31)
MCHC RBC AUTO-ENTMCNC: 34.8 G/DL (ref 32–36)
MCV RBC AUTO: 90 FL (ref 82–98)
MICROSCOPIC COMMENT: ABNORMAL
MONOCYTES # BLD AUTO: 0.7 K/UL (ref 0.3–1)
MONOCYTES NFR BLD: 13.1 % (ref 4–15)
NEUTROPHILS # BLD AUTO: 3.5 K/UL (ref 1.8–7.7)
NEUTROPHILS NFR BLD: 68.7 % (ref 38–73)
NITRITE UR QL STRIP: NEGATIVE
NRBC BLD-RTO: 0 /100 WBC
PH UR STRIP: 6 [PH] (ref 5–8)
PLATELET # BLD AUTO: 101 K/UL (ref 150–350)
PMV BLD AUTO: 9 FL (ref 9.2–12.9)
POTASSIUM SERPL-SCNC: 4.3 MMOL/L (ref 3.5–5.1)
PROT SERPL-MCNC: 7.5 G/DL (ref 6–8.4)
PROT UR QL STRIP: ABNORMAL
PROTHROMBIN TIME: 11.1 SEC (ref 9–12.5)
RBC # BLD AUTO: 4.07 M/UL (ref 4.6–6.2)
RBC #/AREA URNS HPF: 6 /HPF (ref 0–4)
SARS-COV-2 RDRP RESP QL NAA+PROBE: NEGATIVE
SODIUM SERPL-SCNC: 133 MMOL/L (ref 136–145)
SP GR UR STRIP: 1.02 (ref 1–1.03)
SQUAMOUS #/AREA URNS HPF: 3 /HPF
URN SPEC COLLECT METH UR: ABNORMAL
UROBILINOGEN UR STRIP-ACNC: NEGATIVE EU/DL
WBC # BLD AUTO: 5.05 K/UL (ref 3.9–12.7)
WBC #/AREA URNS HPF: 1 /HPF (ref 0–5)

## 2020-07-24 PROCEDURE — 81000 URINALYSIS NONAUTO W/SCOPE: CPT

## 2020-07-24 PROCEDURE — 93005 ELECTROCARDIOGRAM TRACING: CPT

## 2020-07-24 PROCEDURE — 96375 TX/PRO/DX INJ NEW DRUG ADDON: CPT | Mod: 59

## 2020-07-24 PROCEDURE — 25000003 PHARM REV CODE 250: Performed by: PHYSICIAN ASSISTANT

## 2020-07-24 PROCEDURE — 87040 BLOOD CULTURE FOR BACTERIA: CPT

## 2020-07-24 PROCEDURE — 25500020 PHARM REV CODE 255: Performed by: EMERGENCY MEDICINE

## 2020-07-24 PROCEDURE — 85025 COMPLETE CBC W/AUTO DIFF WBC: CPT

## 2020-07-24 PROCEDURE — 96365 THER/PROPH/DIAG IV INF INIT: CPT

## 2020-07-24 PROCEDURE — C9113 INJ PANTOPRAZOLE SODIUM, VIA: HCPCS | Performed by: PHYSICIAN ASSISTANT

## 2020-07-24 PROCEDURE — 83605 ASSAY OF LACTIC ACID: CPT

## 2020-07-24 PROCEDURE — 85610 PROTHROMBIN TIME: CPT

## 2020-07-24 PROCEDURE — 25000003 PHARM REV CODE 250: Performed by: EMERGENCY MEDICINE

## 2020-07-24 PROCEDURE — 85730 THROMBOPLASTIN TIME PARTIAL: CPT

## 2020-07-24 PROCEDURE — 93010 EKG 12-LEAD: ICD-10-PCS | Mod: ,,, | Performed by: INTERNAL MEDICINE

## 2020-07-24 PROCEDURE — 93010 ELECTROCARDIOGRAM REPORT: CPT | Mod: ,,, | Performed by: INTERNAL MEDICINE

## 2020-07-24 PROCEDURE — 96367 TX/PROPH/DG ADDL SEQ IV INF: CPT

## 2020-07-24 PROCEDURE — U0002 COVID-19 LAB TEST NON-CDC: HCPCS

## 2020-07-24 PROCEDURE — 80053 COMPREHEN METABOLIC PANEL: CPT

## 2020-07-24 PROCEDURE — 63600175 PHARM REV CODE 636 W HCPCS: Performed by: PHYSICIAN ASSISTANT

## 2020-07-24 PROCEDURE — 83690 ASSAY OF LIPASE: CPT

## 2020-07-24 PROCEDURE — 99285 EMERGENCY DEPT VISIT HI MDM: CPT | Mod: 25

## 2020-07-24 RX ORDER — PANTOPRAZOLE SODIUM 40 MG/10ML
40 INJECTION, POWDER, LYOPHILIZED, FOR SOLUTION INTRAVENOUS
Status: COMPLETED | OUTPATIENT
Start: 2020-07-24 | End: 2020-07-24

## 2020-07-24 RX ORDER — ONDANSETRON 4 MG/1
4 TABLET, ORALLY DISINTEGRATING ORAL EVERY 6 HOURS PRN
Qty: 30 TABLET | Refills: 0 | Status: SHIPPED | OUTPATIENT
Start: 2020-07-24 | End: 2020-07-27

## 2020-07-24 RX ORDER — ACETAMINOPHEN 500 MG
1000 TABLET ORAL
Status: COMPLETED | OUTPATIENT
Start: 2020-07-24 | End: 2020-07-24

## 2020-07-24 RX ORDER — PANTOPRAZOLE SODIUM 20 MG/1
20 TABLET, DELAYED RELEASE ORAL DAILY
Qty: 30 TABLET | Refills: 0 | Status: SHIPPED | OUTPATIENT
Start: 2020-07-24 | End: 2020-07-27

## 2020-07-24 RX ORDER — AMOXICILLIN AND CLAVULANATE POTASSIUM 562.5; 437.5; 62.5 MG/1; MG/1; MG/1
2 TABLET, FILM COATED, EXTENDED RELEASE ORAL 2 TIMES DAILY
Qty: 28 TABLET | Refills: 0 | Status: SHIPPED | OUTPATIENT
Start: 2020-07-24 | End: 2020-07-31

## 2020-07-24 RX ORDER — AZITHROMYCIN 250 MG/1
250 TABLET, FILM COATED ORAL DAILY
Qty: 6 TABLET | Refills: 0 | Status: SHIPPED | OUTPATIENT
Start: 2020-07-24 | End: 2020-09-15 | Stop reason: ALTCHOICE

## 2020-07-24 RX ADMIN — CEFTRIAXONE 1 G: 1 INJECTION, SOLUTION INTRAVENOUS at 10:07

## 2020-07-24 RX ADMIN — ACETAMINOPHEN 1000 MG: 500 TABLET ORAL at 07:07

## 2020-07-24 RX ADMIN — IOHEXOL 85 ML: 350 INJECTION, SOLUTION INTRAVENOUS at 10:07

## 2020-07-24 RX ADMIN — PANTOPRAZOLE SODIUM 40 MG: 40 INJECTION, POWDER, LYOPHILIZED, FOR SOLUTION INTRAVENOUS at 08:07

## 2020-07-24 RX ADMIN — AZITHROMYCIN 500 MG: 500 INJECTION, POWDER, LYOPHILIZED, FOR SOLUTION INTRAVENOUS at 11:07

## 2020-07-25 VITALS
OXYGEN SATURATION: 98 % | WEIGHT: 235 LBS | HEIGHT: 72 IN | DIASTOLIC BLOOD PRESSURE: 68 MMHG | RESPIRATION RATE: 18 BRPM | BODY MASS INDEX: 31.83 KG/M2 | SYSTOLIC BLOOD PRESSURE: 132 MMHG | HEART RATE: 66 BPM | TEMPERATURE: 99 F

## 2020-07-25 NOTE — ED TRIAGE NOTES
Pt states that has been feeling ill since last Sunday. Pt began having generalized pain, heavy in the abdomen and head area. Pt states that he has been taking Tylenol.

## 2020-07-25 NOTE — ED PROVIDER NOTES
Encounter Date: 7/24/2020       History     Chief Complaint   Patient presents with    Fever     Pt c/o fever, chills and mild headache since last Sunday. Pt denies any SOB, cough, chest pain, nausea, vomiting, diarrhea or being exposed to anyone sick. Pt took tylenol around 1000 this morning.     78yo M with past medical history AFib with long-term anticoagulation, arthritis, cardiomyopathy, CHF, CAD, heart block with pacemaker in place, hypertension, hyperlipidemia, presents to ED with chief complaint fever x2 days, chills today. Pt states Sunday he had dinner with his family; he states his daughter in law began with abdominal pain, feeling unwell; he states she works at [employer] in [place], that she took off of work on Monday due to feeling unwell. Pt states he too began with epigastric abdominal cramping pain on Sunday evening, intermittent throughout the week, also intermittently with frontal headache.  He feels bloated.  He states the pain is intermittent, coming and going, without alleviating or exacerbating factors.  Pain is not associated with meals.  No visual disturbance, lightheadedness/dizziness, weakness, fatigue, unilateral weakness. No anosmia, ageusia, myalgias. No associated n/v/d. No cough, SOB, JOY. No CP. No neck pain/stiffness. No urinary complaints. No new rash. Denies any recent illness. No recent hospitalization. He admits to decreased appetite and intake over the course of the week. No URI symptoms.    History of cholecystectomy, history of bilateral inguinal hernia repair.  History of pacemaker placement.        Review of patient's allergies indicates:   Allergen Reactions    Pradaxa [dabigatran etexilate] Itching and Rash    Ace inhibitors      Other reaction(s): cough    Chocolate flavor      Other reaction(s): Rash     Past Medical History:   Diagnosis Date    *Atrial fibrillation     Anticoagulant long-term use     Arthritis     Atrial fibrillation     Cardiomyopathy      CHF (congestive heart failure)     Coronary artery disease     Heart block     Hyperlipidemia     Hypertension      Past Surgical History:   Procedure Laterality Date    CHOLECYSTECTOMY      HERNIA REPAIR      INSERT / REPLACE / REMOVE PACEMAKER       Family History   Problem Relation Age of Onset    Cancer Mother         breast    Cancer Sister         breast    Mental illness Son         depression     Social History     Tobacco Use    Smoking status: Never Smoker    Smokeless tobacco: Never Used   Substance Use Topics    Alcohol use: Yes     Alcohol/week: 3.0 standard drinks     Types: 3 Glasses of wine per week     Comment: social    Drug use: No     Review of Systems   Constitutional: Positive for appetite change, chills and fever. Negative for activity change and fatigue.   Eyes: Negative for visual disturbance.   Respiratory: Negative for cough and shortness of breath.    Cardiovascular: Negative for chest pain and leg swelling.   Gastrointestinal: Positive for abdominal pain. Negative for blood in stool, constipation, diarrhea, nausea and vomiting.   Genitourinary: Negative for dysuria, flank pain, frequency, penile pain and testicular pain.   Musculoskeletal: Negative for myalgias, neck pain and neck stiffness.   Skin: Negative for rash.   Neurological: Positive for headaches. Negative for dizziness, syncope, weakness, light-headedness and numbness.       Physical Exam     Initial Vitals [07/24/20 1936]   BP Pulse Resp Temp SpO2   (!) 164/79 82 18 (!) 100.8 °F (38.2 °C) 97 %      MAP       --         Physical Exam    Nursing note and vitals reviewed.  Constitutional: He appears well-developed and well-nourished. He is not diaphoretic. No distress.   Overall well-appearing and nontoxic.  Resting comfortably on exam table.   HENT:   Head: Normocephalic and atraumatic.   Tacky mucous membranes, upper and lower dentures in place   Eyes: EOM are normal.   Neck: Normal range of motion. Neck  supple.   Cardiovascular: Intact distal pulses.   No pretibial edema.  No unilateral leg swelling or calf tenderness.   Pulmonary/Chest: Breath sounds normal. No respiratory distress. He has no wheezes. He has no rhonchi.   Left chest wall pacemaker in place   Abdominal: Soft. Bowel sounds are normal.   Large upper abdomen ventral hernia, spontaneously reduces when not valsalva. Only mildly tender epigastric region. No rebound or guarding.    Musculoskeletal: Normal range of motion.   Lymphadenopathy:     He has no cervical adenopathy.   Neurological: He is alert and oriented to person, place, and time.   Skin: Skin is warm.   Psychiatric: He has a normal mood and affect. Thought content normal.         ED Course   Procedures  Labs Reviewed   CBC W/ AUTO DIFFERENTIAL - Abnormal; Notable for the following components:       Result Value    RBC 4.07 (*)     Hemoglobin 12.8 (*)     Hematocrit 36.8 (*)     Mean Corpuscular Hemoglobin 31.4 (*)     Platelets 101 (*)     MPV 9.0 (*)     Lymph # 0.8 (*)     Lymph% 16.4 (*)     All other components within normal limits   COMPREHENSIVE METABOLIC PANEL - Abnormal; Notable for the following components:    Sodium 133 (*)     Glucose 131 (*)     Total Bilirubin 2.0 (*)     AST 48 (*)     ALT 71 (*)     All other components within normal limits   APTT - Abnormal; Notable for the following components:    aPTT 33.6 (*)     All other components within normal limits   URINALYSIS, REFLEX TO URINE CULTURE - Abnormal; Notable for the following components:    Protein, UA 1+ (*)     Occult Blood UA 2+ (*)     All other components within normal limits    Narrative:     Specimen Source->Urine   URINALYSIS MICROSCOPIC - Abnormal; Notable for the following components:    RBC, UA 6 (*)     All other components within normal limits    Narrative:     Specimen Source->Urine   CULTURE, BLOOD   CULTURE, BLOOD   PROTIME-INR   LIPASE   SARS-COV-2 RNA AMPLIFICATION, QUAL   LACTIC ACID, PLASMA     EKG  Readings: (Independently Interpreted)   Ventricular paced rhythm, ventricular rate 66 beats per minute.  No acute change from previous dated 05/03/2019.  No ST elevation or evidence of acute ischemia.  No malignant arrhythmia.       Imaging Results           CTA Chest Non-Coronary (PE Study) (Final result)  Result time 07/24/20 22:49:43    Final result by Carmen Lopez MD (07/24/20 22:49:43)                 Impression:      No evidence of acute pulmonary embolism. 4.1 cm ascending thoracic aortic aneurysm.    Masslike consolidation and ground-glass opacities in the right lower lobe. The possibility of a pneumonic process should be considered.  Follow-up to resolution to exclude the possibility of underlying neoplasia.    Hepatic steatosis.    Splenomegaly.    This report was flagged in Epic as abnormal.      Electronically signed by: Carmen Lopez  Date:    07/24/2020  Time:    22:49             Narrative:    EXAMINATION:  CT PULMONARY ANGIOGRAM WITH CONTRAST    CLINICAL HISTORY:  Shortness of breath.    TECHNIQUE:  CT of the chest with intravenous contrast for pulmonary artery angiogram was performed. Contiguous axial 1.25 mm images followed by 10 mm reconstructions with multiplanar and MIP reformations of the pulmonary arteries. No 3D post-angiographic imaging was performed on an independent workstation and reviewed.  A 5 ml of Omnipaque 350 was injected.    COMPARISON:  None.    FINDINGS:  There is no evidence of pulmonary artery filling defect to suggest pulmonary embolism. There is no aortic dissection.  There is a 4.1 cm ascending thoracic aortic aneurysm.  Mild vascular calcification is present.    There is a masslike consolidation and ground-glass opacities in the right lower lobe.  The possibility of a pneumonic process should be considered.    There is no evidence of mediastinal, hilar, or axillary adenopathy. Calcified right hilar and mediastinal adenopathy.    There is no pleural or pericardial  effusion.    The heart size is within normal limits.  There is a left chest pacer.    In the visualized upper abdomen, hepatic steatosis is seen.  There is splenomegaly.                               CT Abdomen Pelvis With Contrast (Final result)  Result time 07/24/20 22:33:31    Final result by Carmen Lopez MD (07/24/20 22:33:31)                 Impression:      Hepatic steatosis.  Probable tiny left hepatic cyst.    Splenomegaly.    Bilateral adrenal low-attenuation lesions, which are indeterminate.  Findings may represent adenomas.  However, consider MRI of the adrenal glands with in phase and out of phase sequencing when clinically appropriate.    Prostatomegaly.      Electronically signed by: Carmen Lopez  Date:    07/24/2020  Time:    22:33             Narrative:    EXAMINATION:  CT OF ABDOMEN PELVIS WITH    CLINICAL HISTORY:  Epigastric pain;    TECHNIQUE:  5 mm enhanced axial images were obtained from the lung bases through the greater trochanters.  Eighty-five mL of Omnipaque 350 was injected.    COMPARISON:  None.    FINDINGS:  There is fatty infiltration of the liver.  There is a too small to characterize low attenuation lesion seen in the lateral segment of the left lobe of the liver.    The spleen is enlarged.  The adrenal glands appear hyperplastic.    The pancreas and kidneys are unremarkable. The gallbladder .    There is no definite evidence for abdominal adenopathy or ascites.    There are no pelvic masses or adenopathy.  The prostate gland is enlarged measuring up to 6.4 cm.  Recommend correlation with PSA.  A small right fat containing inguinal hernia is present.  There are bilateral ureteral jets.    There is no free fluid in the pelvis.    There is multilevel degenerative changes of the spine.    Please see CTA chest performed on the same date.                               X-Ray Chest 1 View (Final result)  Result time 07/24/20 21:37:42    Final result by Cam Blue MD (07/24/20  21:37:42)                 Impression:      Airspace opacity in the right lung base, suggestive of pneumonitis.      Electronically signed by: Cam Blue MD  Date:    07/24/2020  Time:    21:37             Narrative:    EXAMINATION:  XR CHEST 1 VIEW    CLINICAL HISTORY:  Fever, unspecified    TECHNIQUE:  Single frontal view of the chest was performed.    COMPARISON:  02/03/2019.    FINDINGS:  There is unchanged appearance of a left-sided dual cardiac pacing device.  The trachea is unremarkable.  The cardiomediastinal silhouette is enlarged.  The hemidiaphragms are unremarkable.  There is no evidence of free air beneath the hemidiaphragms.  There are no pleural effusions.  There is no evidence of a pneumothorax.  There is no evidence of pneumomediastinum.  There is an airspace opacity in the right lung base.  There are degenerative changes in the osseous structures.                                 Medical Decision Making:   Differential Diagnosis:   Viral illness, community-acquired pneumonia, UTI, sepsis, cellulitis, meningitis, PE  Clinical Tests:   Lab Tests: Ordered and Reviewed  Radiological Study: Ordered and Reviewed  Medical Tests: Ordered and Reviewed  ED Management:  79-year-old male presents with some mild epigastric pain, frontal headache over the past 6 days.  Daughter-in-law with similar illness.  He also admits to fever x2 days, chills times today.  Denies cough.  No shortness of breath, dyspnea on exertion, chest pain.  He is overall well-appearing nontoxic.  Initially he was febrile at 100.8.  No significant tachycardia, however he is beta blockade.  No hypoxia.  Lungs are clear.    Workup grossly unremarkable.  No leukocytosis.  No lymphopenia or leukopenia.  Lactic within normal limits.  COVID negative.  Chest x-ray with right lower lobe consolidation, CTA without PE, again with right lower lobe consolidation.  CT abdomen pelvis without any acute abnormality.  I did make patient aware of ascending  thoracic aortic aneurysm.  He denies any chest pain, there is no radiation to his back, there is no ripping or tearing character to his epigastric pain.  Equal upper extremity pulses.  Do not suspect dissection at this time.  I will treat as community-acquired pneumonia, given greater than 65 years old with comorbidities I will use Augmentin and macrolide.  Rocephin and azithromycin given in the ED. Patient feels much better on re-evaluation, states that the Protonix very much improved does abdominal pain.  I will discharge with PPI as well.                                 Clinical Impression:       ICD-10-CM ICD-9-CM   1. Pneumonia of right lower lobe due to infectious organism  J18.1 486   2. Fever  R50.9 780.60   3. Epigastric pain  R10.13 789.06   4. Thoracic aortic aneurysm without rupture  I71.2 441.2         Disposition:   Disposition: Discharged  Condition: Stable     ED Disposition Condition    Discharge Stable        ED Prescriptions     Medication Sig Dispense Start Date End Date Auth. Provider    pantoprazole (PROTONIX) 20 MG tablet Take 1 tablet (20 mg total) by mouth once daily. 30 tablet 7/24/2020 7/24/2021 William Reeves PA-C    ondansetron (ZOFRAN-ODT) 4 MG TbDL Take 1 tablet (4 mg total) by mouth every 6 (six) hours as needed (Nausea). 30 tablet 7/24/2020  William Reeves PA-C    azithromycin (Z-ERASMO) 250 MG tablet Take 1 tablet (250 mg total) by mouth once daily. Take first 2 tablets together, then 1 every day until finished. 6 tablet 7/24/2020  William Reeves PA-C    amoxicillin-clavulanate 1,000-62.5 mg (AUGMENTIN XR) 1,000-62.5 mg per tablet Take 2 tablets by mouth 2 (two) times a day. for 7 days 28 tablet 7/24/2020 7/31/2020 William Reeves PA-C        Follow-up Information     Follow up With Specialties Details Why Contact Info    Anthony Denton MD Internal Medicine, Wound Care Schedule an appointment as soon as possible for a visit  For reevaluation [address]  [place] [id number]  [phone]      [facility] Emergency Medicine  As needed, If symptoms worsen [address]  [facility] [address] [phone]

## 2020-07-25 NOTE — DISCHARGE INSTRUCTIONS
Take all antibiotics as prescribed, try to take with meals to limit nausea.  Zofran for persistent nausea.  Continue with Tylenol or ibuprofen as needed for temperature greater than or equal to 100.4° F. drink lots of fluids, stay well hydrated.    Please contact your primary and follow-up early this week for re-evaluation.  Return to this ED if unable to treat fever, if you begin with shortness of breath or chest pain, if any other problems occur.

## 2020-07-27 ENCOUNTER — OFFICE VISIT (OUTPATIENT)
Dept: FAMILY MEDICINE | Facility: CLINIC | Age: 80
End: 2020-07-27
Payer: MEDICARE

## 2020-07-27 VITALS
OXYGEN SATURATION: 97 % | TEMPERATURE: 98 F | RESPIRATION RATE: 19 BRPM | SYSTOLIC BLOOD PRESSURE: 104 MMHG | BODY MASS INDEX: 30.98 KG/M2 | HEIGHT: 75 IN | HEART RATE: 89 BPM | DIASTOLIC BLOOD PRESSURE: 63 MMHG | WEIGHT: 249.13 LBS

## 2020-07-27 DIAGNOSIS — J18.9 PNEUMONIA OF RIGHT LOWER LOBE DUE TO INFECTIOUS ORGANISM: Primary | ICD-10-CM

## 2020-07-27 PROCEDURE — 1126F AMNT PAIN NOTED NONE PRSNT: CPT | Mod: S$GLB,,, | Performed by: INTERNAL MEDICINE

## 2020-07-27 PROCEDURE — 99999 PR PBB SHADOW E&M-EST. PATIENT-LVL IV: ICD-10-PCS | Mod: PBBFAC,,, | Performed by: INTERNAL MEDICINE

## 2020-07-27 PROCEDURE — 3078F PR MOST RECENT DIASTOLIC BLOOD PRESSURE < 80 MM HG: ICD-10-PCS | Mod: S$GLB,,, | Performed by: INTERNAL MEDICINE

## 2020-07-27 PROCEDURE — 1101F PT FALLS ASSESS-DOCD LE1/YR: CPT | Mod: S$GLB,,, | Performed by: INTERNAL MEDICINE

## 2020-07-27 PROCEDURE — 1159F MED LIST DOCD IN RCRD: CPT | Mod: S$GLB,,, | Performed by: INTERNAL MEDICINE

## 2020-07-27 PROCEDURE — 1126F PR PAIN SEVERITY QUANTIFIED, NO PAIN PRESENT: ICD-10-PCS | Mod: S$GLB,,, | Performed by: INTERNAL MEDICINE

## 2020-07-27 PROCEDURE — 99213 OFFICE O/P EST LOW 20 MIN: CPT | Mod: S$GLB,,, | Performed by: INTERNAL MEDICINE

## 2020-07-27 PROCEDURE — 1101F PR PT FALLS ASSESS DOC 0-1 FALLS W/OUT INJ PAST YR: ICD-10-PCS | Mod: S$GLB,,, | Performed by: INTERNAL MEDICINE

## 2020-07-27 PROCEDURE — 99999 PR PBB SHADOW E&M-EST. PATIENT-LVL IV: CPT | Mod: PBBFAC,,, | Performed by: INTERNAL MEDICINE

## 2020-07-27 PROCEDURE — 99213 PR OFFICE/OUTPT VISIT, EST, LEVL III, 20-29 MIN: ICD-10-PCS | Mod: S$GLB,,, | Performed by: INTERNAL MEDICINE

## 2020-07-27 PROCEDURE — 3074F SYST BP LT 130 MM HG: CPT | Mod: S$GLB,,, | Performed by: INTERNAL MEDICINE

## 2020-07-27 PROCEDURE — 1159F PR MEDICATION LIST DOCUMENTED IN MEDICAL RECORD: ICD-10-PCS | Mod: S$GLB,,, | Performed by: INTERNAL MEDICINE

## 2020-07-27 PROCEDURE — 3078F DIAST BP <80 MM HG: CPT | Mod: S$GLB,,, | Performed by: INTERNAL MEDICINE

## 2020-07-27 PROCEDURE — 3074F PR MOST RECENT SYSTOLIC BLOOD PRESSURE < 130 MM HG: ICD-10-PCS | Mod: S$GLB,,, | Performed by: INTERNAL MEDICINE

## 2020-07-27 NOTE — PROGRESS NOTES
"Subjective:       Patient ID: Aldo Doll is a 79 y.o. male.    Chief Complaint: Hospital Follow Up and Establish Care    F/u ED    HPI: 78 y/o w/ HTN Afib presents for ED follow up. Last week general malasie subjective fevers and right upper quadrent pain. Went to ED 7/24 imaging showed right lower lobe consolidation no significant pleural effusion normal WBC, lactic acid and LFT's. Has been takign augmentin and azithromycin as prescribed feels more energy. Never short of breath. No further fevers. No nausea/vomitting/diarrhea/abdominal pain     Review of Systems   Constitutional: Negative for activity change, appetite change, fatigue, fever and unexpected weight change.   HENT: Negative for ear pain, rhinorrhea and sore throat.    Eyes: Negative for discharge and visual disturbance.   Respiratory: Negative for chest tightness, shortness of breath and wheezing.    Cardiovascular: Negative for chest pain, palpitations and leg swelling.   Gastrointestinal: Negative for abdominal pain, constipation and diarrhea.   Endocrine: Negative for cold intolerance and heat intolerance.   Genitourinary: Negative for dysuria and hematuria.   Musculoskeletal: Negative for joint swelling and neck stiffness.   Skin: Negative for rash.   Neurological: Negative for dizziness, syncope, weakness and headaches.   Psychiatric/Behavioral: Negative for suicidal ideas.       Objective:     Vitals:    07/27/20 1543   BP: 104/63   BP Location: Left arm   Patient Position: Sitting   BP Method: Medium (Manual)   Pulse: 89   Resp: 19   Temp: 97.6 °F (36.4 °C)   TempSrc: Oral   SpO2: 97%   Weight: 113 kg (249 lb 1.9 oz)   Height: 6' 3" (1.905 m)          Physical Exam  Constitutional:       Appearance: He is well-developed.   HENT:      Head: Normocephalic and atraumatic.   Eyes:      General: No scleral icterus.     Conjunctiva/sclera: Conjunctivae normal.   Neck:      Musculoskeletal: Normal range of motion.   Cardiovascular:      Rate and " Rhythm: Normal rate and regular rhythm.      Heart sounds: No murmur. No friction rub. No gallop.    Pulmonary:      Effort: Pulmonary effort is normal.      Breath sounds: Normal breath sounds. No wheezing or rales.   Abdominal:      General: Bowel sounds are normal. There is no distension.      Palpations: Abdomen is soft.      Tenderness: There is no abdominal tenderness. There is no right CVA tenderness, left CVA tenderness, guarding or rebound.   Musculoskeletal: Normal range of motion.         General: No tenderness.      Right lower leg: No edema.      Left lower leg: No edema.   Skin:     General: Skin is warm and dry.   Neurological:      Mental Status: He is alert and oriented to person, place, and time.      Cranial Nerves: No cranial nerve deficit.   Psychiatric:         Mood and Affect: Mood normal.         Behavior: Behavior normal.         Assessment and Plan   1. Pneumonia of right lower lobe due to infectious organism  Repeat two view xray in two weeks if still with right lower lobe consolidation may need repeat ct of chest. Complete antibiotics as prescribed not taking ppi or antiemetic  - X-Ray Chest PA And Lateral; Future

## 2020-07-30 LAB
BACTERIA BLD CULT: NORMAL
BACTERIA BLD CULT: NORMAL

## 2020-08-10 ENCOUNTER — TELEPHONE (OUTPATIENT)
Dept: FAMILY MEDICINE | Facility: CLINIC | Age: 80
End: 2020-08-10

## 2020-08-10 ENCOUNTER — APPOINTMENT (OUTPATIENT)
Dept: RADIOLOGY | Facility: HOSPITAL | Age: 80
End: 2020-08-10
Attending: INTERNAL MEDICINE
Payer: MEDICARE

## 2020-08-10 DIAGNOSIS — J18.9 PNEUMONIA OF RIGHT LOWER LOBE DUE TO INFECTIOUS ORGANISM: ICD-10-CM

## 2020-08-10 PROCEDURE — 71046 X-RAY EXAM CHEST 2 VIEWS: CPT | Mod: TC,FY,PN

## 2020-08-10 PROCEDURE — 71046 X-RAY EXAM CHEST 2 VIEWS: CPT | Mod: 26,,, | Performed by: RADIOLOGY

## 2020-08-10 PROCEDURE — 71046 XR CHEST PA AND LATERAL: ICD-10-PCS | Mod: 26,,, | Performed by: RADIOLOGY

## 2020-09-05 ENCOUNTER — HOSPITAL ENCOUNTER (OUTPATIENT)
Facility: HOSPITAL | Age: 80
Discharge: HOME | End: 2020-09-07
Attending: EMERGENCY MEDICINE | Admitting: INTERNAL MEDICINE
Payer: MEDICARE

## 2020-09-05 DIAGNOSIS — R00.1 BRADYCARDIA: ICD-10-CM

## 2020-09-05 DIAGNOSIS — I25.10 CAD (CORONARY ARTERY DISEASE): ICD-10-CM

## 2020-09-05 DIAGNOSIS — R07.9 CHEST PAIN: ICD-10-CM

## 2020-09-05 DIAGNOSIS — I50.22 CHRONIC SYSTOLIC CONGESTIVE HEART FAILURE: ICD-10-CM

## 2020-09-05 DIAGNOSIS — I48.0 PAROXYSMAL ATRIAL FIBRILLATION: ICD-10-CM

## 2020-09-05 DIAGNOSIS — I49.9 DYSRHYTHMIA: ICD-10-CM

## 2020-09-05 LAB
ALBUMIN SERPL BCP-MCNC: 4.4 G/DL (ref 3.5–5.2)
ALP SERPL-CCNC: 56 U/L (ref 55–135)
ALT SERPL W/O P-5'-P-CCNC: 37 U/L (ref 10–44)
ANION GAP SERPL CALC-SCNC: 10 MMOL/L (ref 8–16)
APTT BLDCRRT: 28.7 SEC (ref 21–32)
AST SERPL-CCNC: 25 U/L (ref 10–40)
BASOPHILS # BLD AUTO: 0.04 K/UL (ref 0–0.2)
BASOPHILS NFR BLD: 0.6 % (ref 0–1.9)
BILIRUB SERPL-MCNC: 0.7 MG/DL (ref 0.1–1)
BNP SERPL-MCNC: 67 PG/ML (ref 0–99)
BUN SERPL-MCNC: 18 MG/DL (ref 8–23)
CALCIUM SERPL-MCNC: 10 MG/DL (ref 8.7–10.5)
CHLORIDE SERPL-SCNC: 103 MMOL/L (ref 95–110)
CO2 SERPL-SCNC: 26 MMOL/L (ref 23–29)
CREAT SERPL-MCNC: 1.1 MG/DL (ref 0.5–1.4)
D DIMER PPP IA.FEU-MCNC: <0.19 MG/L FEU
DIFFERENTIAL METHOD: ABNORMAL
EOSINOPHIL # BLD AUTO: 0.5 K/UL (ref 0–0.5)
EOSINOPHIL NFR BLD: 7.6 % (ref 0–8)
ERYTHROCYTE [DISTWIDTH] IN BLOOD BY AUTOMATED COUNT: 14.6 % (ref 11.5–14.5)
EST. GFR  (AFRICAN AMERICAN): >60 ML/MIN/1.73 M^2
EST. GFR  (NON AFRICAN AMERICAN): >60 ML/MIN/1.73 M^2
GLUCOSE SERPL-MCNC: 99 MG/DL (ref 70–110)
HCT VFR BLD AUTO: 39.8 % (ref 40–54)
HGB BLD-MCNC: 13.7 G/DL (ref 14–18)
IMM GRANULOCYTES # BLD AUTO: 0.04 K/UL (ref 0–0.04)
IMM GRANULOCYTES NFR BLD AUTO: 0.6 % (ref 0–0.5)
INR PPP: 0.9 (ref 0.8–1.2)
LYMPHOCYTES # BLD AUTO: 2.5 K/UL (ref 1–4.8)
LYMPHOCYTES NFR BLD: 36.9 % (ref 18–48)
MAGNESIUM SERPL-MCNC: 2.2 MG/DL (ref 1.6–2.6)
MCH RBC QN AUTO: 31.9 PG (ref 27–31)
MCHC RBC AUTO-ENTMCNC: 34.4 G/DL (ref 32–36)
MCV RBC AUTO: 93 FL (ref 82–98)
MONOCYTES # BLD AUTO: 0.6 K/UL (ref 0.3–1)
MONOCYTES NFR BLD: 8.8 % (ref 4–15)
NEUTROPHILS # BLD AUTO: 3.1 K/UL (ref 1.8–7.7)
NEUTROPHILS NFR BLD: 45.5 % (ref 38–73)
NRBC BLD-RTO: 0 /100 WBC
PHOSPHATE SERPL-MCNC: 3.4 MG/DL (ref 2.7–4.5)
PLATELET # BLD AUTO: 121 K/UL (ref 150–350)
PMV BLD AUTO: 9 FL (ref 9.2–12.9)
POTASSIUM SERPL-SCNC: 4.4 MMOL/L (ref 3.5–5.1)
PROT SERPL-MCNC: 7.2 G/DL (ref 6–8.4)
PROTHROMBIN TIME: 10.4 SEC (ref 9–12.5)
RBC # BLD AUTO: 4.29 M/UL (ref 4.6–6.2)
SODIUM SERPL-SCNC: 139 MMOL/L (ref 136–145)
TROPONIN I SERPL DL<=0.01 NG/ML-MCNC: <0.006 NG/ML (ref 0–0.03)
TSH SERPL DL<=0.005 MIU/L-ACNC: 3.01 UIU/ML (ref 0.4–4)
WBC # BLD AUTO: 6.81 K/UL (ref 3.9–12.7)

## 2020-09-05 PROCEDURE — 85025 COMPLETE CBC W/AUTO DIFF WBC: CPT

## 2020-09-05 PROCEDURE — 84100 ASSAY OF PHOSPHORUS: CPT

## 2020-09-05 PROCEDURE — 83880 ASSAY OF NATRIURETIC PEPTIDE: CPT

## 2020-09-05 PROCEDURE — 80053 COMPREHEN METABOLIC PANEL: CPT

## 2020-09-05 PROCEDURE — 85610 PROTHROMBIN TIME: CPT

## 2020-09-05 PROCEDURE — 93005 ELECTROCARDIOGRAM TRACING: CPT

## 2020-09-05 PROCEDURE — 93010 EKG 12-LEAD: ICD-10-PCS | Mod: 76,,, | Performed by: INTERNAL MEDICINE

## 2020-09-05 PROCEDURE — 84484 ASSAY OF TROPONIN QUANT: CPT

## 2020-09-05 PROCEDURE — 36000 PLACE NEEDLE IN VEIN: CPT

## 2020-09-05 PROCEDURE — 93010 ELECTROCARDIOGRAM REPORT: CPT | Mod: ,,, | Performed by: INTERNAL MEDICINE

## 2020-09-05 PROCEDURE — 84443 ASSAY THYROID STIM HORMONE: CPT

## 2020-09-05 PROCEDURE — 85379 FIBRIN DEGRADATION QUANT: CPT

## 2020-09-05 PROCEDURE — 85730 THROMBOPLASTIN TIME PARTIAL: CPT

## 2020-09-05 PROCEDURE — 99285 EMERGENCY DEPT VISIT HI MDM: CPT | Mod: 25

## 2020-09-05 PROCEDURE — 83735 ASSAY OF MAGNESIUM: CPT

## 2020-09-06 PROBLEM — I49.9 DYSRHYTHMIA: Status: RESOLVED | Noted: 2020-09-06 | Resolved: 2020-09-06

## 2020-09-06 PROBLEM — T82.111A PACEMAKER MALFUNCTION, INITIAL ENCOUNTER: Status: ACTIVE | Noted: 2020-09-06

## 2020-09-06 PROBLEM — I49.9 DYSRHYTHMIA: Status: ACTIVE | Noted: 2020-09-06

## 2020-09-06 LAB
AORTIC ROOT ANNULUS: 3.68 CM
AORTIC VALVE CUSP SEPERATION: 1.77 CM
AV INDEX (PROSTH): 0.65
AV MEAN GRADIENT: 5 MMHG
AV PEAK GRADIENT: 11 MMHG
AV VALVE AREA: 2.8 CM2
AV VELOCITY RATIO: 0.65
BILIRUB UR QL STRIP: NEGATIVE
BSA FOR ECHO PROCEDURE: 2.24 M2
CLARITY UR: CLEAR
COLOR UR: YELLOW
CV ECHO LV RWT: 0.51 CM
DOP CALC AO PEAK VEL: 1.63 M/S
DOP CALC AO VTI: 31.45 CM
DOP CALC LVOT AREA: 4.3 CM2
DOP CALC LVOT DIAMETER: 2.35 CM
DOP CALC LVOT PEAK VEL: 1.06 M/S
DOP CALC LVOT STROKE VOLUME: 88.18 CM3
DOP CALCLVOT PEAK VEL VTI: 20.34 CM
E WAVE DECELERATION TIME: 224.73 MSEC
E/A RATIO: 1.44
ECHO LV POSTERIOR WALL: 1.31 CM (ref 0.6–1.1)
FRACTIONAL SHORTENING: 29 % (ref 28–44)
GLUCOSE UR QL STRIP: NEGATIVE
HGB UR QL STRIP: NEGATIVE
INTERVENTRICULAR SEPTUM: 1.3 CM (ref 0.6–1.1)
IVRT: 114.19 MSEC
KETONES UR QL STRIP: NEGATIVE
LA MAJOR: 4.9 CM
LA MINOR: 4.68 CM
LA WIDTH: 5.03 CM
LEFT ATRIUM SIZE: 4.34 CM
LEFT ATRIUM VOLUME INDEX: 36.8 ML/M2
LEFT ATRIUM VOLUME: 88.83 CM3
LEFT INTERNAL DIMENSION IN SYSTOLE: 3.69 CM (ref 2.1–4)
LEFT VENTRICLE DIASTOLIC VOLUME INDEX: 52.97 ML/M2
LEFT VENTRICLE DIASTOLIC VOLUME: 127.81 ML
LEFT VENTRICLE MASS INDEX: 115 G/M2
LEFT VENTRICLE SYSTOLIC VOLUME INDEX: 23.9 ML/M2
LEFT VENTRICLE SYSTOLIC VOLUME: 57.72 ML
LEFT VENTRICULAR INTERNAL DIMENSION IN DIASTOLE: 5.17 CM (ref 3.5–6)
LEFT VENTRICULAR MASS: 277.43 G
LEUKOCYTE ESTERASE UR QL STRIP: NEGATIVE
MV PEAK A VEL: 0.59 M/S
MV PEAK E VEL: 0.85 M/S
MV STENOSIS PRESSURE HALF TIME: 65.17 MS
MV VALVE AREA P 1/2 METHOD: 3.38 CM2
NITRITE UR QL STRIP: NEGATIVE
PH UR STRIP: 6 [PH] (ref 5–8)
PISA TR MAX VEL: 2.73 M/S
PROT UR QL STRIP: NEGATIVE
PULM VEIN S/D RATIO: 1.62
PV PEAK D VEL: 0.37 M/S
PV PEAK S VEL: 0.6 M/S
PV PEAK VELOCITY: 0.96 CM/S
RA MAJOR: 4.52 CM
RA PRESSURE: 3 MMHG
RA WIDTH: 3.9 CM
RIGHT VENTRICULAR END-DIASTOLIC DIMENSION: 3.86 CM
SARS-COV-2 RDRP RESP QL NAA+PROBE: NEGATIVE
SINUS: 3.31 CM
SP GR UR STRIP: 1.01 (ref 1–1.03)
TR MAX PG: 30 MMHG
TRICUSPID ANNULAR PLANE SYSTOLIC EXCURSION: 1.96 CM
TV REST PULMONARY ARTERY PRESSURE: 33 MMHG
URN SPEC COLLECT METH UR: NORMAL
UROBILINOGEN UR STRIP-ACNC: NEGATIVE EU/DL

## 2020-09-06 PROCEDURE — U0002 COVID-19 LAB TEST NON-CDC: HCPCS

## 2020-09-06 PROCEDURE — 21400001 HC TELEMETRY ROOM

## 2020-09-06 PROCEDURE — 25000003 PHARM REV CODE 250: Performed by: INTERNAL MEDICINE

## 2020-09-06 PROCEDURE — 81003 URINALYSIS AUTO W/O SCOPE: CPT

## 2020-09-06 PROCEDURE — 99223 PR INITIAL HOSPITAL CARE,LEVL III: ICD-10-PCS | Mod: ,,, | Performed by: INTERNAL MEDICINE

## 2020-09-06 PROCEDURE — 99223 1ST HOSP IP/OBS HIGH 75: CPT | Mod: ,,, | Performed by: INTERNAL MEDICINE

## 2020-09-06 PROCEDURE — G0378 HOSPITAL OBSERVATION PER HR: HCPCS

## 2020-09-06 RX ORDER — OXYCODONE HYDROCHLORIDE 5 MG/1
10 TABLET ORAL EVERY 6 HOURS PRN
Status: DISCONTINUED | OUTPATIENT
Start: 2020-09-06 | End: 2020-09-06

## 2020-09-06 RX ORDER — GLUCAGON 1 MG
1 KIT INJECTION
Status: DISCONTINUED | OUTPATIENT
Start: 2020-09-06 | End: 2020-09-07 | Stop reason: HOSPADM

## 2020-09-06 RX ORDER — METOPROLOL SUCCINATE 50 MG/1
100 TABLET, EXTENDED RELEASE ORAL DAILY
Status: DISCONTINUED | OUTPATIENT
Start: 2020-09-06 | End: 2020-09-06

## 2020-09-06 RX ORDER — IBUPROFEN 200 MG
24 TABLET ORAL
Status: DISCONTINUED | OUTPATIENT
Start: 2020-09-06 | End: 2020-09-07 | Stop reason: HOSPADM

## 2020-09-06 RX ORDER — VALSARTAN 80 MG/1
160 TABLET ORAL DAILY
Status: DISCONTINUED | OUTPATIENT
Start: 2020-09-06 | End: 2020-09-07 | Stop reason: HOSPADM

## 2020-09-06 RX ORDER — ACETAMINOPHEN 325 MG/1
650 TABLET ORAL EVERY 4 HOURS PRN
Status: DISCONTINUED | OUTPATIENT
Start: 2020-09-06 | End: 2020-09-07 | Stop reason: HOSPADM

## 2020-09-06 RX ORDER — TALC
6 POWDER (GRAM) TOPICAL NIGHTLY PRN
Status: DISCONTINUED | OUTPATIENT
Start: 2020-09-06 | End: 2020-09-07 | Stop reason: HOSPADM

## 2020-09-06 RX ORDER — IBUPROFEN 200 MG
16 TABLET ORAL
Status: DISCONTINUED | OUTPATIENT
Start: 2020-09-06 | End: 2020-09-07 | Stop reason: HOSPADM

## 2020-09-06 RX ORDER — OXYCODONE HYDROCHLORIDE 5 MG/1
5 TABLET ORAL EVERY 6 HOURS PRN
Status: DISCONTINUED | OUTPATIENT
Start: 2020-09-06 | End: 2020-09-06

## 2020-09-06 RX ORDER — ONDANSETRON 8 MG/1
8 TABLET, ORALLY DISINTEGRATING ORAL EVERY 8 HOURS PRN
Status: DISCONTINUED | OUTPATIENT
Start: 2020-09-06 | End: 2020-09-07 | Stop reason: HOSPADM

## 2020-09-06 RX ORDER — SODIUM CHLORIDE 0.9 % (FLUSH) 0.9 %
10 SYRINGE (ML) INJECTION
Status: DISCONTINUED | OUTPATIENT
Start: 2020-09-06 | End: 2020-09-07 | Stop reason: HOSPADM

## 2020-09-06 RX ADMIN — VALSARTAN 160 MG: 80 TABLET, FILM COATED ORAL at 10:09

## 2020-09-06 NOTE — HPI
Mr. Doll is an 78yo man with a past medical history of HLD, complete heart block with placement of dual chamber PPM, CAD and HTN. He is s/p dual chamber PPM with generator change on 5/28/2015, non obstructive CAD with mildly depressed EF, AF (CHADS-VASC of 4) on Eliquis.  He states he was in his regular state of health until yesterday when he was sleeping on his right side and had his head/ear over his arm.  He heard a strange pacemaker clicking noise radiating to his ear.  This prompted him to get up and check his pulse.  Over the next hour, his pulse was noted to vary from 80 to 48 to 70, then back to 48.  He never had any symptoms associated with this, but became concerned his battery might be out or that the pacer might be malfunctioning, so he came to the ED.    Patient states that yesterday he noted that his heart rate was low.  He also felt an irregular heartbeat.  Hence this decided to come to the ER.  He denies any chest pains at rest on exertion, orthopnea, PND, dizziness, syncopal episodes.  He states that he thought his pacemaker was malfunctioning and he just wanted to make it showed that it was doing okay.  I personally reviewed his EKGs as well as tele monitoring.  Shows atrial and ventricular paced rhythm, occasional PVCs.

## 2020-09-06 NOTE — PLAN OF CARE
Problem: Adult Inpatient Plan of Care  Goal: Plan of Care Review  Flowsheets (Taken 9/6/2020 0634)  Plan of Care Reviewed With: patient     Problem: Fall Injury Risk  Goal: Absence of Fall and Fall-Related Injury  Intervention: Identify and Manage Contributors to Fall Injury Risk  Flowsheets (Taken 9/6/2020 0634)  Medication Review/Management: medications reviewed  Intervention: Promote Injury-Free Environment  Flowsheets (Taken 9/6/2020 0634)  Safety Promotion/Fall Prevention:   bed alarm set   room near unit station   side rails raised x 2  Environmental Safety Modification:   clutter free environment maintained   lighting adjusted   room near unit station   room organization consistent     Problem: Arrhythmia/Dysrhythmia  Goal: Normalized Cardiac Rhythm  Intervention: Monitor and Manage Cardiac Rhythm Effects  Flowsheets (Taken 9/6/2020 0634)  VTE Prevention/Management: ROM (active) performed     Problem: Adjustment to Illness (Cardiac Rhythm Management Device)  Goal: Optimal Adjustment to Device Presence  Intervention: Optimize Psychosocial Response to Device Implantation  Flowsheets (Taken 9/6/2020 0634)  Supportive Measures: verbalization of feelings encouraged  Family/Support System Care: involvement promoted     Problem: Bleeding (Cardiac Rhythm Management Device)  Goal: Absence of Bleeding  Intervention: Monitor and Manage Bleeding  Flowsheets (Taken 9/6/2020 0634)  Bleeding Precautions: blood pressure closely monitored     Problem: Pain (Cardiac Rhythm Management Device)  Goal: Acceptable Pain Level  Intervention: Prevent or Manage Pain  Flowsheets (Taken 9/6/2020 0634)  Pain Management Interventions: awakened for pain meds per patient request     Problem: Respiratory Compromise (Cardiac Rhythm Management Device)  Goal: Effective Respiratory Effort and Oxygenation  Intervention: Optimize Oxygenation and Ventilation  Flowsheets (Taken 9/6/2020 0634)  Airway/Ventilation Management: calming measures  promoted

## 2020-09-06 NOTE — ASSESSMENT & PLAN NOTE
Patient concerned about pacemaker malfunction.  Appears to be pacing on tele monitoring.  Check pacemaker by [employer] representative.

## 2020-09-06 NOTE — NURSING TRANSFER
Nursing Transfer Note      9/6/2020     Transfer From: ED To: 310B    Transfer via stretcher    Transfer with cardiac monitoring    Transported by transport personnel    Medicines sent: none    Chart send with patient: Yes    Notified: spouse    Patient reassessed at 325am     Upon arrival to floor patient walked from wheelchair to restroom with no deficits noted. Cardiac monitor applied once patient out of restroom. Vital signs obtained and can be found in flow sheets with complete patient assessment. Skin dry and intact with a 20g RAC PIV noted saline locked. No complaints of pain or signs of respiratory distress noted. Plan to monitor heart rate, consult cardiology, maintain npo status for possible cardiac testing, and await further recommendation from the consulted provider. Patient updated on plan of care and verbalized understanding. Call light in reach and patient instructed to inform the nurse if anything is needed. Patient stable and will continue to be monitored.

## 2020-09-06 NOTE — PLAN OF CARE
Discharge planning and home needs addressed with patient; patient confirmed information on face sheet as correct;     TN Role Explained.  Patient identified by using 2 identifiers:  Name and date of birth    Patient stated that he lives independently at home with spouse; 0 DME and denies needs     Patient stated that spouse, Polina WILL HELP AT HOME WITH her RECOVERY if needed.       TN name and means of contact given to patient and encouraged to call for any discharge needs or questions         [id number] - [place] - [address]  [address] [place] [address]  Phone: [phone] Fax: [phone]    [id number] - [place] - [address]  [address]  [place] [address]  Phone: [phone] Fax: [phone]     09/06/20 1147   Discharge Assessment   Assessment Type Discharge Planning Assessment   Confirmed/corrected address and phone number on facesheet? Yes   Assessment information obtained from? Patient   Expected Length of Stay (days) 3   Communicated expected length of stay with patient/caregiver yes   Prior to hospitilization cognitive status: Alert/Oriented   Prior to hospitalization functional status: Independent   Current cognitive status: Alert/Oriented   Current Functional Status: Independent   Lives With spouse   Able to Return to Prior Arrangements yes   Is patient able to care for self after discharge? Yes   Who are your caregiver(s) and their phone number(s)? Polina Linares (spouse) [phone]   Patient's perception of discharge disposition home or selfcare   Readmission Within the Last 30 Days no previous admission in last 30 days   Equipment Currently Used at Home none   Part D Coverage [employer]BS Medicare   Do you have any problems affording any of your prescribed medications? No   Is the patient taking medications as prescribed? yes   Does the patient have transportation home? Yes   Transportation Anticipated car, drives self   Does the patient receive  services at the [facility]? No   Discharge Plan A Home   DME Needed Upon Discharge  none   Patient/Family in Agreement with Plan yes

## 2020-09-06 NOTE — SUBJECTIVE & OBJECTIVE
Past Medical History:   Diagnosis Date    *Atrial fibrillation     Anticoagulant long-term use     Arthritis     Atrial fibrillation     Cardiomyopathy     CHF (congestive heart failure)     Coronary artery disease     Heart block     Hyperlipidemia     Hypertension        Past Surgical History:   Procedure Laterality Date    CHOLECYSTECTOMY      HERNIA REPAIR      INSERT / REPLACE / REMOVE PACEMAKER         Review of patient's allergies indicates:   Allergen Reactions    Pradaxa [dabigatran etexilate] Itching and Rash    Ace inhibitors      Other reaction(s): cough    Chocolate flavor      Other reaction(s): Rash       No current facility-administered medications on file prior to encounter.      Current Outpatient Medications on File Prior to Encounter   Medication Sig    ELIQUIS 5 mg Tab Take 1 tablet by mouth twice daily    metoprolol succinate (TOPROL-XL) 100 MG 24 hr tablet Take 1 tablet (100 mg total) by mouth once daily.    valsartan (DIOVAN) 160 MG tablet Take 1 tablet (160 mg total) by mouth once daily.    azithromycin (Z-ERASMO) 250 MG tablet Take 1 tablet (250 mg total) by mouth once daily. Take first 2 tablets together, then 1 every day until finished.    fish oil-omega-3 fatty acids 300-1,000 mg capsule Take 2 capsules by mouth once daily.      Family History     Problem Relation (Age of Onset)    Cancer Mother, Sister    Mental illness Son        Tobacco Use    Smoking status: Never Smoker    Smokeless tobacco: Never Used   Substance and Sexual Activity    Alcohol use: Yes     Alcohol/week: 3.0 standard drinks     Types: 3 Glasses of wine per week     Comment: daily    Drug use: No    Sexual activity: Yes     Partners: Female     Review of Systems   Constitution: Negative.   HENT: Negative.    Eyes: Negative.    Cardiovascular: Negative.    Respiratory: Negative.    Endocrine: Negative.    Hematologic/Lymphatic: Negative.    Skin: Negative.    Musculoskeletal: Negative.     Gastrointestinal: Negative.    Genitourinary: Negative.    Neurological: Negative.    Psychiatric/Behavioral: Negative.    Allergic/Immunologic: Negative.      Objective:     Vital Signs (Most Recent):  Temp: 96.9 °F (36.1 °C) (09/06/20 1122)  Pulse: 69 (09/06/20 1126)  Resp: 18 (09/06/20 1122)  BP: 122/75 (09/06/20 1122)  SpO2: 97 % (09/06/20 1122) Vital Signs (24h Range):  Temp:  [96.2 °F (35.7 °C)-98.2 °F (36.8 °C)] 96.9 °F (36.1 °C)  Pulse:  [44-69] 69  Resp:  [18-22] 18  SpO2:  [94 %-97 %] 97 %  BP: (122-158)/(63-87) 122/75     Weight: 113.4 kg (250 lb)  Body mass index is 31.25 kg/m².    SpO2: 97 %  O2 Device (Oxygen Therapy): room air      Intake/Output Summary (Last 24 hours) at 9/6/2020 1414  Last data filed at 9/6/2020 1040  Gross per 24 hour   Intake 50 ml   Output 850 ml   Net -800 ml       Lines/Drains/Airways     Peripheral Intravenous Line                 Peripheral IV - Single Lumen 09/05/20 2238 20 G Right Antecubital less than 1 day                Physical Exam   Constitutional: He is oriented to person, place, and time. He appears well-developed and well-nourished.   HENT:   Head: Normocephalic.   Eyes: Pupils are equal, round, and reactive to light. Conjunctivae are normal.   Neck: Normal range of motion. Neck supple.   Cardiovascular: Normal rate and normal heart sounds. An irregularly irregular rhythm present.   Pulmonary/Chest: Effort normal and breath sounds normal.   Abdominal: Soft. Bowel sounds are normal.   Neurological: He is alert and oriented to person, place, and time.   Skin: Skin is warm.   Vitals reviewed.      Significant Labs:   BMP:   Recent Labs   Lab 09/05/20 2237   GLU 99      K 4.4      CO2 26   BUN 18   CREATININE 1.1   CALCIUM 10.0   MG 2.2   , CMP   Recent Labs   Lab 09/05/20 2237      K 4.4      CO2 26   GLU 99   BUN 18   CREATININE 1.1   CALCIUM 10.0   PROT 7.2   ALBUMIN 4.4   BILITOT 0.7   ALKPHOS 56   AST 25   ALT 37   ANIONGAP 10    ESTGFRAFRICA >60   EGFRNONAA >60   , CBC   Recent Labs   Lab 09/05/20 2237   WBC 6.81   HGB 13.7*   HCT 39.8*   *   , INR   Recent Labs   Lab 09/05/20 2237   INR 0.9   , Lipid Panel No results for input(s): CHOL, HDL, LDLCALC, TRIG, CHOLHDL in the last 48 hours., Troponin   Recent Labs   Lab 09/05/20 2237   TROPONINI <0.006    and All pertinent lab results from the last 24 hours have been reviewed.    Significant Imaging: Echocardiogram:   Transthoracic echo (TTE) complete (Cupid Only):   Results for orders placed or performed during the hospital encounter of 09/05/20   Echo Color Flow Doppler? Yes   Result Value Ref Range    AV mean gradient 5 mmHg    Ao peak serafin 1.63 m/s    Ao VTI 31.45 cm    IVRT 114.19 msec    IVS 1.30 (A) 0.6 - 1.1 cm    LA size 4.34 cm    Left Atrium Major Axis 4.90 cm    Left Atrium Minor Axis 4.68 cm    LVIDD 5.17 3.5 - 6.0 cm    LVIDS 3.69 2.1 - 4.0 cm    LVOT diameter 2.35 cm    LVOT peak VTI 20.34 cm    PW 1.31 (A) 0.6 - 1.1 cm    MV Peak A Serafin 0.59 m/s    E wave decelartion time 224.73 msec    MV Peak E Serafin 0.85 m/s    PV Peak D Serafin 0.37 m/s    PV Peak S Serafin 0.60 m/s    RA Major Axis 4.52 cm    RA Width 3.90 cm    RVDD 3.86 cm    Sinus 3.31 cm    TAPSE 1.96 cm    TR Max Serafin 2.73 m/s    LA WIDTH 5.03 cm    Ao root annulus 3.68 cm    AORTIC VALVE CUSP SEPERATION 1.77 cm    PV PEAK VELOCITY 0.96 cm/s    MV stenosis pressure 1/2 time 65.17 ms    LV Diastolic Volume 127.81 mL    LV Systolic Volume 57.72 mL    LVOT peak serafin 1.06 m/s    FS 29 %    LA volume 88.83 cm3    LV mass 277.43 g    Left Ventricle Relative Wall Thickness 0.51 cm    AV valve area 2.80 cm2    AV Velocity Ratio 0.65     AV index (prosthetic) 0.65     MV valve area p 1/2 method 3.38 cm2    E/A ratio 1.44     Pulm vein S/D ratio 1.62     LVOT area 4.3 cm2    LVOT stroke volume 88.18 cm3    AV peak gradient 11 mmHg    LV Systolic Volume Index 23.9 mL/m2    LV Diastolic Volume Index 52.97 mL/m2    LA Volume Index  36.8 mL/m2    LV Mass Index 115 g/m2    Triscuspid Valve Regurgitation Peak Gradient 30 mmHg    BSA 2.24 m2      No

## 2020-09-06 NOTE — SUBJECTIVE & OBJECTIVE
Past Medical History:   Diagnosis Date    *Atrial fibrillation     Anticoagulant long-term use     Arthritis     Atrial fibrillation     Cardiomyopathy     CHF (congestive heart failure)     Coronary artery disease     Heart block     Hyperlipidemia     Hypertension        Past Surgical History:   Procedure Laterality Date    CHOLECYSTECTOMY      HERNIA REPAIR      INSERT / REPLACE / REMOVE PACEMAKER         Review of patient's allergies indicates:   Allergen Reactions    Pradaxa [dabigatran etexilate] Itching and Rash    Ace inhibitors      Other reaction(s): cough    Chocolate flavor      Other reaction(s): Rash       No current facility-administered medications on file prior to encounter.      Current Outpatient Medications on File Prior to Encounter   Medication Sig    ELIQUIS 5 mg Tab Take 1 tablet by mouth twice daily    metoprolol succinate (TOPROL-XL) 100 MG 24 hr tablet Take 1 tablet (100 mg total) by mouth once daily.    valsartan (DIOVAN) 160 MG tablet Take 1 tablet (160 mg total) by mouth once daily.    azithromycin (Z-ERASMO) 250 MG tablet Take 1 tablet (250 mg total) by mouth once daily. Take first 2 tablets together, then 1 every day until finished.    fish oil-omega-3 fatty acids 300-1,000 mg capsule Take 2 capsules by mouth once daily.      Family History     Problem Relation (Age of Onset)    Cancer Mother, Sister    Mental illness Son        Tobacco Use    Smoking status: Never Smoker    Smokeless tobacco: Never Used   Substance and Sexual Activity    Alcohol use: Yes     Alcohol/week: 3.0 standard drinks     Types: 3 Glasses of wine per week     Comment: daily    Drug use: No    Sexual activity: Yes     Partners: Female     Review of Systems   Constitutional: Negative for chills, fatigue and fever.   HENT: Negative for congestion.    Eyes: Negative for redness.   Respiratory: Negative for cough and shortness of breath.    Cardiovascular: Negative for chest pain.    Gastrointestinal: Negative for abdominal pain, constipation, diarrhea, nausea and vomiting.   Endocrine: Negative for cold intolerance.   Genitourinary: Negative for dysuria.   Musculoskeletal: Negative for back pain.   Skin: Negative for rash.   Allergic/Immunologic: Negative for immunocompromised state.   Neurological: Negative for dizziness and weakness.   Psychiatric/Behavioral: Negative for confusion. The patient is nervous/anxious.      Objective:     Vital Signs (Most Recent):  Temp: 98.1 °F (36.7 °C) (09/06/20 0325)  Pulse: 64 (09/06/20 0331)  Resp: 18 (09/06/20 0325)  BP: (!) 144/68 (09/06/20 0331)  SpO2: 96 % (09/06/20 0325) Vital Signs (24h Range):  Temp:  [98.1 °F (36.7 °C)-98.2 °F (36.8 °C)] 98.1 °F (36.7 °C)  Pulse:  [44-64] 64  Resp:  [18-22] 18  SpO2:  [94 %-96 %] 96 %  BP: (138-158)/(68-87) 144/68     Weight: 113.4 kg (250 lb)  Body mass index is 31.25 kg/m².    Physical Exam  Vitals signs and nursing note reviewed.   Constitutional:       Appearance: He is well-developed. He is not ill-appearing, toxic-appearing or diaphoretic.   HENT:      Head: Normocephalic and atraumatic.      Nose: Nose normal.      Mouth/Throat:      Pharynx: No oropharyngeal exudate.   Eyes:      General: No scleral icterus.        Right eye: No discharge.         Left eye: No discharge.      Conjunctiva/sclera: Conjunctivae normal.      Pupils: Pupils are equal, round, and reactive to light.   Neck:      Musculoskeletal: Normal range of motion and neck supple.      Thyroid: No thyromegaly.      Vascular: No JVD.      Trachea: No tracheal deviation.   Cardiovascular:      Rate and Rhythm: Normal rate and regular rhythm.      Heart sounds: Normal heart sounds. No murmur. No friction rub. No gallop.       Comments: Left chest pacemaker in place, no redness or pain  Pulmonary:      Effort: Pulmonary effort is normal. No respiratory distress.      Breath sounds: Normal breath sounds. No stridor. No wheezing or rales.   Chest:       Chest wall: No tenderness.   Abdominal:      General: Bowel sounds are normal. There is no distension.      Palpations: Abdomen is soft. There is no mass.      Tenderness: There is no abdominal tenderness. There is no guarding or rebound.   Genitourinary:     Comments: No livingston  Musculoskeletal: Normal range of motion.         General: No tenderness.   Lymphadenopathy:      Cervical: No cervical adenopathy.      Comments: No edema   Skin:     General: Skin is warm and dry.      Coloration: Skin is not pale.      Findings: No erythema or rash.   Neurological:      Mental Status: He is alert and oriented to person, place, and time.      GCS: GCS eye subscore is 4. GCS verbal subscore is 5. GCS motor subscore is 6.      Cranial Nerves: No cranial nerve deficit.      Motor: No abnormal muscle tone.      Coordination: Coordination normal.      Deep Tendon Reflexes: Reflexes normal.   Psychiatric:         Behavior: Behavior normal.         Thought Content: Thought content normal.         Cognition and Memory: Cognition and memory normal.         Judgment: Judgment normal.           CRANIAL NERVES     CN III, IV, VI   Pupils are equal, round, and reactive to light.       Significant Labs:   Recent Results (from the past 24 hour(s))   Comprehensive metabolic panel    Collection Time: 09/05/20 10:37 PM   Result Value Ref Range    Sodium 139 136 - 145 mmol/L    Potassium 4.4 3.5 - 5.1 mmol/L    Chloride 103 95 - 110 mmol/L    CO2 26 23 - 29 mmol/L    Glucose 99 70 - 110 mg/dL    BUN, Bld 18 8 - 23 mg/dL    Creatinine 1.1 0.5 - 1.4 mg/dL    Calcium 10.0 8.7 - 10.5 mg/dL    Total Protein 7.2 6.0 - 8.4 g/dL    Albumin 4.4 3.5 - 5.2 g/dL    Total Bilirubin 0.7 0.1 - 1.0 mg/dL    Alkaline Phosphatase 56 55 - 135 U/L    AST 25 10 - 40 U/L    ALT 37 10 - 44 U/L    Anion Gap 10 8 - 16 mmol/L    eGFR if African American >60 >60 mL/min/1.73 m^2    eGFR if non African American >60 >60 mL/min/1.73 m^2   Brain natriuretic peptide     Collection Time: 09/05/20 10:37 PM   Result Value Ref Range    BNP 67 0 - 99 pg/mL   CBC auto differential    Collection Time: 09/05/20 10:37 PM   Result Value Ref Range    WBC 6.81 3.90 - 12.70 K/uL    RBC 4.29 (L) 4.60 - 6.20 M/uL    Hemoglobin 13.7 (L) 14.0 - 18.0 g/dL    Hematocrit 39.8 (L) 40.0 - 54.0 %    Mean Corpuscular Volume 93 82 - 98 fL    Mean Corpuscular Hemoglobin 31.9 (H) 27.0 - 31.0 pg    Mean Corpuscular Hemoglobin Conc 34.4 32.0 - 36.0 g/dL    RDW 14.6 (H) 11.5 - 14.5 %    Platelets 121 (L) 150 - 350 K/uL    MPV 9.0 (L) 9.2 - 12.9 fL    Immature Granulocytes 0.6 (H) 0.0 - 0.5 %    Gran # (ANC) 3.1 1.8 - 7.7 K/uL    Immature Grans (Abs) 0.04 0.00 - 0.04 K/uL    Lymph # 2.5 1.0 - 4.8 K/uL    Mono # 0.6 0.3 - 1.0 K/uL    Eos # 0.5 0.0 - 0.5 K/uL    Baso # 0.04 0.00 - 0.20 K/uL    nRBC 0 0 /100 WBC    Gran% 45.5 38.0 - 73.0 %    Lymph% 36.9 18.0 - 48.0 %    Mono% 8.8 4.0 - 15.0 %    Eosinophil% 7.6 0.0 - 8.0 %    Basophil% 0.6 0.0 - 1.9 %    Differential Method Automated    D dimer, quantitative    Collection Time: 09/05/20 10:37 PM   Result Value Ref Range    D-Dimer <0.19 <0.50 mg/L FEU   Troponin I    Collection Time: 09/05/20 10:37 PM   Result Value Ref Range    Troponin I <0.006 0.000 - 0.026 ng/mL   TSH    Collection Time: 09/05/20 10:37 PM   Result Value Ref Range    TSH 3.013 0.400 - 4.000 uIU/mL   APTT    Collection Time: 09/05/20 10:37 PM   Result Value Ref Range    aPTT 28.7 21.0 - 32.0 sec   Protime-INR    Collection Time: 09/05/20 10:37 PM   Result Value Ref Range    Prothrombin Time 10.4 9.0 - 12.5 sec    INR 0.9 0.8 - 1.2   Magnesium    Collection Time: 09/05/20 10:37 PM   Result Value Ref Range    Magnesium 2.2 1.6 - 2.6 mg/dL   Phosphorus    Collection Time: 09/05/20 10:37 PM   Result Value Ref Range    Phosphorus 3.4 2.7 - 4.5 mg/dL   COVID-19 Rapid Screening    Collection Time: 09/06/20 12:39 AM   Result Value Ref Range    SARS-CoV-2 RNA, Amplification, Qual Negative Negative    Urinalysis, Reflex to Urine Culture Urine, Clean Catch    Collection Time: 09/06/20  3:14 AM    Specimen: Urine   Result Value Ref Range    Specimen UA Urine, Clean Catch     Color, UA Yellow Yellow, Straw, Marie    Appearance, UA Clear Clear    pH, UA 6.0 5.0 - 8.0    Specific Gravity, UA 1.010 1.005 - 1.030    Protein, UA Negative Negative    Glucose, UA Negative Negative    Ketones, UA Negative Negative    Bilirubin (UA) Negative Negative    Occult Blood UA Negative Negative    Nitrite, UA Negative Negative    Urobilinogen, UA Negative <2.0 EU/dL    Leukocytes, UA Negative Negative         Significant Imaging:   XR CHEST AP PORTABLE     FINDINGS:  Monitoring EKG leads are present.  There is unchanged appearance of a left-sided dual-lead cardiac device.     The trachea is unremarkable.  The cardiac silhouette is at the upper limits of normal.  There is no evidence of free air beneath the hemidiaphragms.  There are no pleural effusions.  There is no evidence of a pneumothorax.  There is no evidence of pneumomediastinum.  No airspace opacity is present.  There are degenerative changes in the osseous structures.     Impression:   Stable appearance of left-sided cardiac pacing device.   No acute process.      Electronically signed by: Cam Blue MD  Date:                                            09/05/2020     EKG:   Atrial sensed, V-paced rythm  Vent. rate 71 BPM  MO interval 98 ms  QRS duration 214 ms  QT/QTc 494/536 ms    2D Echo with CFD:5/24/19:  · Low normal left ventricular systolic function. The estimated ejection fraction is 50-55%  · Indeterminate left ventricular diastolic function.  · Normal right ventricular systolic function.  · The estimated PA systolic pressure is 25 mm Hg  · Normal central venous pressure (3 mm Hg).  · Biatrial enlargement.

## 2020-09-06 NOTE — HPI
Mr. Doll is an 78yo man with a past medical history of HLD, complete heart block with placement of dual chamber PPM, CAD and HTN. He is s/p dual chamber PPM with generator change on 5/28/2015, non obstructive CAD with mildly depressed EF, AF (CHADS-VASC of 4) on Eliquis.  He states he was in his regular state of health until yesterday when he was sleeping on his right side and had his head/ear over his arm.  He heard a strange pacemaker clicking noise radiating to his ear.  This prompted him to get up and check his pulse.  Over the next hour, his pulse was noted to vary from 80 to 48 to 70, then back to 48.  He never had any symptoms associated with this, but became concerned his battery might be out or that the pacer might be malfunctioning, so he came to the ED.

## 2020-09-06 NOTE — H&P
"[facility] Medicine  History & Physical    Patient Name: Aldo Doll  MRN: [id number]  Admission Date: 9/5/2020  Attending Physician: Deborah Rodriguez MD   Primary Care Provider: Anthony Denton MD         Patient information was obtained from patient, past medical records and ER records.     Subjective:     Principal Problem:Pacemaker malfunction, initial encounter    Chief Complaint:   Chief Complaint   Patient presents with    Chest Pain     Patient c/o intermittent, left side chest "pressure" that radiates to left shoulder and left upper back with "feeling like something is going tic, tic, tic with my pacemaker and my pulse is slow" x 30mins.  Reports hx of A-fib.  Denies sob, nausea, dizziness, or abd pain.    Bradycardia        HPI: Mr. Doll is an 78yo man with a past medical history of HLD, complete heart block with placement of dual chamber PPM, CAD and HTN. He is s/p dual chamber PPM with generator change on 5/28/2015, non obstructive CAD with mildly depressed EF, AF (CHADS-VASC of 4) on Eliquis.  He states he was in his regular state of health until yesterday when he was sleeping on his right side and had his head/ear over his arm.  He heard a strange pacemaker clicking noise radiating to his ear.  This prompted him to get up and check his pulse.  Over the next hour, his pulse was noted to vary from 80 to 48 to 70, then back to 48.  He never had any symptoms associated with this, but became concerned his battery might be out or that the pacer might be malfunctioning, so he came to the ED.    Past Medical History:   Diagnosis Date    *Atrial fibrillation     Anticoagulant long-term use     Arthritis     Atrial fibrillation     Cardiomyopathy     CHF (congestive heart failure)     Coronary artery disease     Heart block     Hyperlipidemia     Hypertension        Past Surgical History:   Procedure Laterality Date    CHOLECYSTECTOMY      HERNIA REPAIR      " INSERT / REPLACE / REMOVE PACEMAKER         Review of patient's allergies indicates:   Allergen Reactions    Pradaxa [dabigatran etexilate] Itching and Rash    Ace inhibitors      Other reaction(s): cough    Chocolate flavor      Other reaction(s): Rash       No current facility-administered medications on file prior to encounter.      Current Outpatient Medications on File Prior to Encounter   Medication Sig    ELIQUIS 5 mg Tab Take 1 tablet by mouth twice daily    metoprolol succinate (TOPROL-XL) 100 MG 24 hr tablet Take 1 tablet (100 mg total) by mouth once daily.    valsartan (DIOVAN) 160 MG tablet Take 1 tablet (160 mg total) by mouth once daily.    azithromycin (Z-ERASMO) 250 MG tablet Take 1 tablet (250 mg total) by mouth once daily. Take first 2 tablets together, then 1 every day until finished.    fish oil-omega-3 fatty acids 300-1,000 mg capsule Take 2 capsules by mouth once daily.      Family History     Problem Relation (Age of Onset)    Cancer Mother, Sister    Mental illness Son        Tobacco Use    Smoking status: Never Smoker    Smokeless tobacco: Never Used   Substance and Sexual Activity    Alcohol use: Yes     Alcohol/week: 3.0 standard drinks     Types: 3 Glasses of wine per week     Comment: daily    Drug use: No    Sexual activity: Yes     Partners: Female     Review of Systems   Constitutional: Negative for chills, fatigue and fever.   HENT: Negative for congestion.    Eyes: Negative for redness.   Respiratory: Negative for cough and shortness of breath.    Cardiovascular: Negative for chest pain.   Gastrointestinal: Negative for abdominal pain, constipation, diarrhea, nausea and vomiting.   Endocrine: Negative for cold intolerance.   Genitourinary: Negative for dysuria.   Musculoskeletal: Negative for back pain.   Skin: Negative for rash.   Allergic/Immunologic: Negative for immunocompromised state.   Neurological: Negative for dizziness and weakness.   Psychiatric/Behavioral:  Negative for confusion. The patient is nervous/anxious.      Objective:     Vital Signs (Most Recent):  Temp: 98.1 °F (36.7 °C) (09/06/20 0325)  Pulse: 64 (09/06/20 0331)  Resp: 18 (09/06/20 0325)  BP: (!) 144/68 (09/06/20 0331)  SpO2: 96 % (09/06/20 0325) Vital Signs (24h Range):  Temp:  [98.1 °F (36.7 °C)-98.2 °F (36.8 °C)] 98.1 °F (36.7 °C)  Pulse:  [44-64] 64  Resp:  [18-22] 18  SpO2:  [94 %-96 %] 96 %  BP: (138-158)/(68-87) 144/68     Weight: 113.4 kg (250 lb)  Body mass index is 31.25 kg/m².    Physical Exam  Vitals signs and nursing note reviewed.   Constitutional:       Appearance: He is well-developed. He is not ill-appearing, toxic-appearing or diaphoretic.   HENT:      Head: Normocephalic and atraumatic.      Nose: Nose normal.      Mouth/Throat:      Pharynx: No oropharyngeal exudate.   Eyes:      General: No scleral icterus.        Right eye: No discharge.         Left eye: No discharge.      Conjunctiva/sclera: Conjunctivae normal.      Pupils: Pupils are equal, round, and reactive to light.   Neck:      Musculoskeletal: Normal range of motion and neck supple.      Thyroid: No thyromegaly.      Vascular: No JVD.      Trachea: No tracheal deviation.   Cardiovascular:      Rate and Rhythm: Normal rate and regular rhythm.      Heart sounds: Normal heart sounds. No murmur. No friction rub. No gallop.       Comments: Left chest pacemaker in place, no redness or pain  Pulmonary:      Effort: Pulmonary effort is normal. No respiratory distress.      Breath sounds: Normal breath sounds. No stridor. No wheezing or rales.   Chest:      Chest wall: No tenderness.   Abdominal:      General: Bowel sounds are normal. There is no distension.      Palpations: Abdomen is soft. There is no mass.      Tenderness: There is no abdominal tenderness. There is no guarding or rebound.   Genitourinary:     Comments: No livingston  Musculoskeletal: Normal range of motion.         General: No tenderness.   Lymphadenopathy:       Cervical: No cervical adenopathy.      Comments: No edema   Skin:     General: Skin is warm and dry.      Coloration: Skin is not pale.      Findings: No erythema or rash.   Neurological:      Mental Status: He is alert and oriented to person, place, and time.      GCS: GCS eye subscore is 4. GCS verbal subscore is 5. GCS motor subscore is 6.      Cranial Nerves: No cranial nerve deficit.      Motor: No abnormal muscle tone.      Coordination: Coordination normal.      Deep Tendon Reflexes: Reflexes normal.   Psychiatric:         Behavior: Behavior normal.         Thought Content: Thought content normal.         Cognition and Memory: Cognition and memory normal.         Judgment: Judgment normal.           CRANIAL NERVES     CN III, IV, VI   Pupils are equal, round, and reactive to light.       Significant Labs:   Recent Results (from the past 24 hour(s))   Comprehensive metabolic panel    Collection Time: 09/05/20 10:37 PM   Result Value Ref Range    Sodium 139 136 - 145 mmol/L    Potassium 4.4 3.5 - 5.1 mmol/L    Chloride 103 95 - 110 mmol/L    CO2 26 23 - 29 mmol/L    Glucose 99 70 - 110 mg/dL    BUN, Bld 18 8 - 23 mg/dL    Creatinine 1.1 0.5 - 1.4 mg/dL    Calcium 10.0 8.7 - 10.5 mg/dL    Total Protein 7.2 6.0 - 8.4 g/dL    Albumin 4.4 3.5 - 5.2 g/dL    Total Bilirubin 0.7 0.1 - 1.0 mg/dL    Alkaline Phosphatase 56 55 - 135 U/L    AST 25 10 - 40 U/L    ALT 37 10 - 44 U/L    Anion Gap 10 8 - 16 mmol/L    eGFR if African American >60 >60 mL/min/1.73 m^2    eGFR if non African American >60 >60 mL/min/1.73 m^2   Brain natriuretic peptide    Collection Time: 09/05/20 10:37 PM   Result Value Ref Range    BNP 67 0 - 99 pg/mL   CBC auto differential    Collection Time: 09/05/20 10:37 PM   Result Value Ref Range    WBC 6.81 3.90 - 12.70 K/uL    RBC 4.29 (L) 4.60 - 6.20 M/uL    Hemoglobin 13.7 (L) 14.0 - 18.0 g/dL    Hematocrit 39.8 (L) 40.0 - 54.0 %    Mean Corpuscular Volume 93 82 - 98 fL    Mean Corpuscular Hemoglobin  31.9 (H) 27.0 - 31.0 pg    Mean Corpuscular Hemoglobin Conc 34.4 32.0 - 36.0 g/dL    RDW 14.6 (H) 11.5 - 14.5 %    Platelets 121 (L) 150 - 350 K/uL    MPV 9.0 (L) 9.2 - 12.9 fL    Immature Granulocytes 0.6 (H) 0.0 - 0.5 %    Gran # (ANC) 3.1 1.8 - 7.7 K/uL    Immature Grans (Abs) 0.04 0.00 - 0.04 K/uL    Lymph # 2.5 1.0 - 4.8 K/uL    Mono # 0.6 0.3 - 1.0 K/uL    Eos # 0.5 0.0 - 0.5 K/uL    Baso # 0.04 0.00 - 0.20 K/uL    nRBC 0 0 /100 WBC    Gran% 45.5 38.0 - 73.0 %    Lymph% 36.9 18.0 - 48.0 %    Mono% 8.8 4.0 - 15.0 %    Eosinophil% 7.6 0.0 - 8.0 %    Basophil% 0.6 0.0 - 1.9 %    Differential Method Automated    D dimer, quantitative    Collection Time: 09/05/20 10:37 PM   Result Value Ref Range    D-Dimer <0.19 <0.50 mg/L FEU   Troponin I    Collection Time: 09/05/20 10:37 PM   Result Value Ref Range    Troponin I <0.006 0.000 - 0.026 ng/mL   TSH    Collection Time: 09/05/20 10:37 PM   Result Value Ref Range    TSH 3.013 0.400 - 4.000 uIU/mL   APTT    Collection Time: 09/05/20 10:37 PM   Result Value Ref Range    aPTT 28.7 21.0 - 32.0 sec   Protime-INR    Collection Time: 09/05/20 10:37 PM   Result Value Ref Range    Prothrombin Time 10.4 9.0 - 12.5 sec    INR 0.9 0.8 - 1.2   Magnesium    Collection Time: 09/05/20 10:37 PM   Result Value Ref Range    Magnesium 2.2 1.6 - 2.6 mg/dL   Phosphorus    Collection Time: 09/05/20 10:37 PM   Result Value Ref Range    Phosphorus 3.4 2.7 - 4.5 mg/dL   COVID-19 Rapid Screening    Collection Time: 09/06/20 12:39 AM   Result Value Ref Range    SARS-CoV-2 RNA, Amplification, Qual Negative Negative   Urinalysis, Reflex to Urine Culture Urine, Clean Catch    Collection Time: 09/06/20  3:14 AM    Specimen: Urine   Result Value Ref Range    Specimen UA Urine, Clean Catch     Color, UA Yellow Yellow, Straw, Marie    Appearance, UA Clear Clear    pH, UA 6.0 5.0 - 8.0    Specific Gravity, UA 1.010 1.005 - 1.030    Protein, UA Negative Negative    Glucose, UA Negative Negative     Ketones, UA Negative Negative    Bilirubin (UA) Negative Negative    Occult Blood UA Negative Negative    Nitrite, UA Negative Negative    Urobilinogen, UA Negative <2.0 EU/dL    Leukocytes, UA Negative Negative         Significant Imaging:   XR CHEST AP PORTABLE     FINDINGS:  Monitoring EKG leads are present.  There is unchanged appearance of a left-sided dual-lead cardiac device.     The trachea is unremarkable.  The cardiac silhouette is at the upper limits of normal.  There is no evidence of free air beneath the hemidiaphragms.  There are no pleural effusions.  There is no evidence of a pneumothorax.  There is no evidence of pneumomediastinum.  No airspace opacity is present.  There are degenerative changes in the osseous structures.     Impression:   Stable appearance of left-sided cardiac pacing device.   No acute process.      Electronically signed by: Cam Blue MD  Date:                                            09/05/2020     EKG:   Atrial sensed, V-paced rythm  Vent. rate 71 BPM  MD interval 98 ms  QRS duration 214 ms  QT/QTc 494/536 ms    2D Echo with CFD:5/24/19:  · Low normal left ventricular systolic function. The estimated ejection fraction is 50-55%  · Indeterminate left ventricular diastolic function.  · Normal right ventricular systolic function.  · The estimated PA systolic pressure is 25 mm Hg  · Normal central venous pressure (3 mm Hg).  · Biatrial enlargement.    Assessment/Plan:     * Pacemaker malfunction, initial encounter  The patient's pulse is varying, sometimes dropping into the 40's at times  NPO  Consult Cardiology  Place on telemetry  The patient is asymptomatic during the brief times his pulse drops      AV block, complete  This was what prompted placement of the pacemaker      Atrial fibrillation  On EKG today he is A-sensed V-Paced  On anticoagulation      CAD (coronary artery disease)  This is non-obstructive  He has no chest pain or anginal pain  On Beta blocker      Current  use of long term anticoagulation  Hold Apixaban pending need for pacemaker evaluation and possible replacement      Essential hypertension  Hold Toprol xl 100mg po qday (Patient with complete heart block)   -Not sure that this is truly contributing to bradycardia though if CHB  Valsartan 160mg po qday        VTE Risk Mitigation (From admission, onward)         Ordered     IP VTE HIGH RISK PATIENT  Once      09/06/20 0436     Place sequential compression device  Until discontinued      09/06/20 0436                   CHUCKY Butler MD  Department of Hospital Medicine   [facility]

## 2020-09-06 NOTE — NURSING
Patient going to scheduled procedure as ordered Echo Via Wheelchair, on room air, no distress noted. Patient is AAOx4 without any discomfort.

## 2020-09-06 NOTE — CONSULTS
[facility]  Cardiology  Consult Note    Patient Name: Aldo Doll  MRN: [id number]  Admission Date: 9/5/2020  Hospital Length of Stay: 0 days  Code Status: Full Code   Attending Provider: Deborah Rodriguez MD   Consulting Provider: Andres Vargas MD  Primary Care Physician: Anthony Denton MD  Principal Problem:Pacemaker malfunction, initial encounter    Patient information was obtained from patient and ER records.     Inpatient consult to Cardiology  Consult performed by: Adnres Vargas MD  Consult ordered by: Deborah Rodriguez MD        Subjective:     Chief Complaint:  Irregular heartbeat     HPI:   Mr. Doll is an 78yo man with a past medical history of HLD, complete heart block with placement of dual chamber PPM, CAD and HTN. He is s/p dual chamber PPM with generator change on 5/28/2015, non obstructive CAD with mildly depressed EF, AF (CHADS-VASC of 4) on Eliquis.  He states he was in his regular state of health until yesterday when he was sleeping on his right side and had his head/ear over his arm.  He heard a strange pacemaker clicking noise radiating to his ear.  This prompted him to get up and check his pulse.  Over the next hour, his pulse was noted to vary from 80 to 48 to 70, then back to 48.  He never had any symptoms associated with this, but became concerned his battery might be out or that the pacer might be malfunctioning, so he came to the ED.    Patient states that yesterday he noted that his heart rate was low.  He also felt an irregular heartbeat.  Hence this decided to come to the ER.  He denies any chest pains at rest on exertion, orthopnea, PND, dizziness, syncopal episodes.  He states that he thought his pacemaker was malfunctioning and he just wanted to make it showed that it was doing okay.  I personally reviewed his EKGs as well as tele monitoring.  Shows atrial and ventricular paced rhythm, occasional PVCs.        Past Medical History:   Diagnosis Date     *Atrial fibrillation     Anticoagulant long-term use     Arthritis     Atrial fibrillation     Cardiomyopathy     CHF (congestive heart failure)     Coronary artery disease     Heart block     Hyperlipidemia     Hypertension        Past Surgical History:   Procedure Laterality Date    CHOLECYSTECTOMY      HERNIA REPAIR      INSERT / REPLACE / REMOVE PACEMAKER         Review of patient's allergies indicates:   Allergen Reactions    Pradaxa [dabigatran etexilate] Itching and Rash    Ace inhibitors      Other reaction(s): cough    Chocolate flavor      Other reaction(s): Rash       No current facility-administered medications on file prior to encounter.      Current Outpatient Medications on File Prior to Encounter   Medication Sig    ELIQUIS 5 mg Tab Take 1 tablet by mouth twice daily    metoprolol succinate (TOPROL-XL) 100 MG 24 hr tablet Take 1 tablet (100 mg total) by mouth once daily.    valsartan (DIOVAN) 160 MG tablet Take 1 tablet (160 mg total) by mouth once daily.    azithromycin (Z-ERASMO) 250 MG tablet Take 1 tablet (250 mg total) by mouth once daily. Take first 2 tablets together, then 1 every day until finished.    fish oil-omega-3 fatty acids 300-1,000 mg capsule Take 2 capsules by mouth once daily.      Family History     Problem Relation (Age of Onset)    Cancer Mother, Sister    Mental illness Son        Tobacco Use    Smoking status: Never Smoker    Smokeless tobacco: Never Used   Substance and Sexual Activity    Alcohol use: Yes     Alcohol/week: 3.0 standard drinks     Types: 3 Glasses of wine per week     Comment: daily    Drug use: No    Sexual activity: Yes     Partners: Female     Review of Systems   Constitution: Negative.   HENT: Negative.    Eyes: Negative.    Cardiovascular: Negative.    Respiratory: Negative.    Endocrine: Negative.    Hematologic/Lymphatic: Negative.    Skin: Negative.    Musculoskeletal: Negative.    Gastrointestinal: Negative.    Genitourinary:  Negative.    Neurological: Negative.    Psychiatric/Behavioral: Negative.    Allergic/Immunologic: Negative.      Objective:     Vital Signs (Most Recent):  Temp: 96.9 °F (36.1 °C) (09/06/20 1122)  Pulse: 69 (09/06/20 1126)  Resp: 18 (09/06/20 1122)  BP: 122/75 (09/06/20 1122)  SpO2: 97 % (09/06/20 1122) Vital Signs (24h Range):  Temp:  [96.2 °F (35.7 °C)-98.2 °F (36.8 °C)] 96.9 °F (36.1 °C)  Pulse:  [44-69] 69  Resp:  [18-22] 18  SpO2:  [94 %-97 %] 97 %  BP: (122-158)/(63-87) 122/75     Weight: 113.4 kg (250 lb)  Body mass index is 31.25 kg/m².    SpO2: 97 %  O2 Device (Oxygen Therapy): room air      Intake/Output Summary (Last 24 hours) at 9/6/2020 1414  Last data filed at 9/6/2020 1040  Gross per 24 hour   Intake 50 ml   Output 850 ml   Net -800 ml       Lines/Drains/Airways     Peripheral Intravenous Line                 Peripheral IV - Single Lumen 09/05/20 2238 20 G Right Antecubital less than 1 day                Physical Exam   Constitutional: He is oriented to person, place, and time. He appears well-developed and well-nourished.   HENT:   Head: Normocephalic.   Eyes: Pupils are equal, round, and reactive to light. Conjunctivae are normal.   Neck: Normal range of motion. Neck supple.   Cardiovascular: Normal rate and normal heart sounds. An irregularly irregular rhythm present.   Pulmonary/Chest: Effort normal and breath sounds normal.   Abdominal: Soft. Bowel sounds are normal.   Neurological: He is alert and oriented to person, place, and time.   Skin: Skin is warm.   Vitals reviewed.      Significant Labs:   BMP:   Recent Labs   Lab 09/05/20  2237   GLU 99      K 4.4      CO2 26   BUN 18   CREATININE 1.1   CALCIUM 10.0   MG 2.2   , CMP   Recent Labs   Lab 09/05/20  2237      K 4.4      CO2 26   GLU 99   BUN 18   CREATININE 1.1   CALCIUM 10.0   PROT 7.2   ALBUMIN 4.4   BILITOT 0.7   ALKPHOS 56   AST 25   ALT 37   ANIONGAP 10   ESTGFRAFRICA >60   EGFRNONAA >60   , CBC   Recent Labs    Lab 09/05/20 2237   WBC 6.81   HGB 13.7*   HCT 39.8*   *   , INR   Recent Labs   Lab 09/05/20 2237   INR 0.9   , Lipid Panel No results for input(s): CHOL, HDL, LDLCALC, TRIG, CHOLHDL in the last 48 hours., Troponin   Recent Labs   Lab 09/05/20 2237   TROPONINI <0.006    and All pertinent lab results from the last 24 hours have been reviewed.    Significant Imaging: Echocardiogram:   Transthoracic echo (TTE) complete (Cupid Only):   Results for orders placed or performed during the hospital encounter of 09/05/20   Echo Color Flow Doppler? Yes   Result Value Ref Range    AV mean gradient 5 mmHg    Ao peak serafin 1.63 m/s    Ao VTI 31.45 cm    IVRT 114.19 msec    IVS 1.30 (A) 0.6 - 1.1 cm    LA size 4.34 cm    Left Atrium Major Axis 4.90 cm    Left Atrium Minor Axis 4.68 cm    LVIDD 5.17 3.5 - 6.0 cm    LVIDS 3.69 2.1 - 4.0 cm    LVOT diameter 2.35 cm    LVOT peak VTI 20.34 cm    PW 1.31 (A) 0.6 - 1.1 cm    MV Peak A Serafin 0.59 m/s    E wave decelartion time 224.73 msec    MV Peak E Serafin 0.85 m/s    PV Peak D Serafin 0.37 m/s    PV Peak S Serafin 0.60 m/s    RA Major Axis 4.52 cm    RA Width 3.90 cm    RVDD 3.86 cm    Sinus 3.31 cm    TAPSE 1.96 cm    TR Max Serafin 2.73 m/s    LA WIDTH 5.03 cm    Ao root annulus 3.68 cm    AORTIC VALVE CUSP SEPERATION 1.77 cm    PV PEAK VELOCITY 0.96 cm/s    MV stenosis pressure 1/2 time 65.17 ms    LV Diastolic Volume 127.81 mL    LV Systolic Volume 57.72 mL    LVOT peak serafin 1.06 m/s    FS 29 %    LA volume 88.83 cm3    LV mass 277.43 g    Left Ventricle Relative Wall Thickness 0.51 cm    AV valve area 2.80 cm2    AV Velocity Ratio 0.65     AV index (prosthetic) 0.65     MV valve area p 1/2 method 3.38 cm2    E/A ratio 1.44     Pulm vein S/D ratio 1.62     LVOT area 4.3 cm2    LVOT stroke volume 88.18 cm3    AV peak gradient 11 mmHg    LV Systolic Volume Index 23.9 mL/m2    LV Diastolic Volume Index 52.97 mL/m2    LA Volume Index 36.8 mL/m2    LV Mass Index 115 g/m2    Triscuspid Valve  Regurgitation Peak Gradient 30 mmHg    BSA 2.24 m2     Assessment and Plan:     * Pacemaker malfunction, initial encounter  Patient concerned about pacemaker malfunction.  Appears to be pacing on tele monitoring.  Check pacemaker by [employer] representative.    Essential hypertension  Titrate medications as needed for appropriate blood pressure control    AV block, complete  Status post pacemaker.  [employer].  Follows Dr. Monteiro at the [facility].  Have called the pacemaker check to interrogate the pacemaker.  On tele monitoring it appears to be pacing.    Atrial fibrillation  On anticoagulation.  Eliquis has been held and he is getting Lovenox at the current time.        VTE Risk Mitigation (From admission, onward)         Ordered     IP VTE HIGH RISK PATIENT  Once      09/06/20 0436     Place sequential compression device  Until discontinued      09/06/20 0436                Thank you for your consult. I will follow-up with patient. Please contact us if you have any additional questions.    Andres Vargas MD  Cardiology   [facility]

## 2020-09-06 NOTE — ASSESSMENT & PLAN NOTE
The patient's pulse is varying, sometimes dropping into the 40's at times  NPO  Consult Cardiology  Place on telemetry  The patient is asymptomatic during the brief times his pulse drops

## 2020-09-06 NOTE — NURSING
Report Recieved from off going nurse Ritu BARAJAS. Patient is AAO on room air, no distress noted, up talking, and in good spirits. Bed in lowest position, wheels locked, call light in reach. Will continue to monitor

## 2020-09-06 NOTE — ED PROVIDER NOTES
"Encounter Date: 9/5/2020       History     Chief Complaint   Patient presents with    Chest Pain     Patient c/o intermittent, left side chest "pressure" that radiates to left shoulder and left upper back with "feeling like something is going tic, tic, tic with my pacemaker and my pulse is slow" x 30mins.  Reports hx of A-fib.  Denies sob, nausea, dizziness, or abd pain.    Bradycardia     80 yo male with heart block s/p pacemaker presents with irregular heartbeat.  Patient was in his usual state of health today.  He was lying down in bed this evening when he started to notice that his pulse was irregular.  He took his pulse formally with his home cuff and it varied between 60-80 beats per minute down to 44 beats per minute.  Patient also developed some mild shortness of breath and left anterior chest discomfort at this time.  He states his chest discomfort was at most 2/10 on the pain scale.  No fevers.  No sweats.  No nausea vomiting.  Patient has had this particular pacemaker since 2015.  He has had a pacemaker for 18 years overall.      PMH: afib on chronic anticoagulation, CAD, cardiomyopathy, CHF, heart block, HTN, DLD, arthritis   PSH: cholecystectomy, hernia repair, pacemaker        Review of patient's allergies indicates:   Allergen Reactions    Pradaxa [dabigatran etexilate] Itching and Rash    Ace inhibitors      Other reaction(s): cough    Chocolate flavor      Other reaction(s): Rash     Past Medical History:   Diagnosis Date    *Atrial fibrillation     Anticoagulant long-term use     Arthritis     Atrial fibrillation     Cardiomyopathy     CHF (congestive heart failure)     Coronary artery disease     Heart block     Hyperlipidemia     Hypertension      Past Surgical History:   Procedure Laterality Date    CHOLECYSTECTOMY      HERNIA REPAIR      INSERT / REPLACE / REMOVE PACEMAKER       Family History   Problem Relation Age of Onset    Cancer Mother         breast    Cancer Sister  "        breast    Mental illness Son         depression     Social History     Tobacco Use    Smoking status: Never Smoker    Smokeless tobacco: Never Used   Substance Use Topics    Alcohol use: Yes     Alcohol/week: 3.0 standard drinks     Types: 3 Glasses of wine per week     Comment: daily    Drug use: No     Review of Systems   Constitutional: Negative for fever.   HENT: Negative for sore throat.    Eyes: Negative for photophobia.   Respiratory: Positive for shortness of breath.    Cardiovascular: Positive for chest pain and palpitations. Negative for leg swelling.   Gastrointestinal: Negative for abdominal pain.   Genitourinary: Negative for dysuria.   Musculoskeletal: Negative for neck stiffness.   Skin: Negative for rash.   Neurological: Negative for headaches.       Physical Exam     Initial Vitals [09/05/20 2223]   BP Pulse Resp Temp SpO2   138/74 (!) 44 (!) 22 98.2 °F (36.8 °C) (!) 94 %      MAP       --         Physical Exam    Nursing note and vitals reviewed.  Constitutional: He appears well-developed and well-nourished. He is not diaphoretic.   Awake, alert, nontoxic, speaking in complete sentences.    HENT:   Head: Normocephalic and atraumatic.   Eyes: Conjunctivae and EOM are normal. Pupils are equal, round, and reactive to light.   Neck: Normal range of motion. Neck supple.   Cardiovascular: Intact distal pulses.   Murmur heard.  Irregularly irregular bradycardia.   Pulmonary/Chest: He has no wheezes. He has no rhonchi. He has no rales.   Slightly diminished LLL.   Abdominal: Soft. There is no abdominal tenderness.   Musculoskeletal: Normal range of motion. No tenderness or edema.   Neurological: He is alert and oriented to person, place, and time. He has normal strength.   Moving all extremities   Skin: Skin is warm and dry.   Psychiatric: He has a normal mood and affect.         ED Course   Procedures  Labs Reviewed   CBC W/ AUTO DIFFERENTIAL - Abnormal; Notable for the following components:        Result Value    RBC 4.29 (*)     Hemoglobin 13.7 (*)     Hematocrit 39.8 (*)     Mean Corpuscular Hemoglobin 31.9 (*)     RDW 14.6 (*)     Platelets 121 (*)     MPV 9.0 (*)     Immature Granulocytes 0.6 (*)     All other components within normal limits   COMPREHENSIVE METABOLIC PANEL   B-TYPE NATRIURETIC PEPTIDE   D DIMER, QUANTITATIVE   TROPONIN I   TSH   APTT   PROTIME-INR   MAGNESIUM   PHOSPHORUS   SARS-COV-2 RNA AMPLIFICATION, QUAL     EKG Readings: (Independently Interpreted)   22:23: Paced, HR approx 90. Frequent PVCs. L axis. No STEMI.   22:34: Paced, HR approx 70. Frequent PVCs. L axis. No STEMI.        Imaging Results          X-Ray Chest AP Portable (Final result)  Result time 09/05/20 23:53:00    Final result by Cam Blue MD (09/05/20 23:53:00)                 Impression:      Stable appearance of left-sided cardiac pacing device.    No acute process.      Electronically signed by: Cam Blue MD  Date:    09/05/2020  Time:    23:53             Narrative:    EXAMINATION:  XR CHEST AP PORTABLE    CLINICAL HISTORY:  dysrhythmia;    TECHNIQUE:  Single frontal view of the chest was performed.    COMPARISON:  08/10/2020.    FINDINGS:  Monitoring EKG leads are present.  There is unchanged appearance of a left-sided dual-lead cardiac device.    The trachea is unremarkable.  The cardiac silhouette is at the upper limits of normal.  There is no evidence of free air beneath the hemidiaphragms.  There are no pleural effusions.  There is no evidence of a pneumothorax.  There is no evidence of pneumomediastinum.  No airspace opacity is present.  There are degenerative changes in the osseous structures.                              X-Rays:   Independently Interpreted Readings:   Other Readings:  CXR L AICD, no acute process     Medical Decision Making:   History:   Old Medical Records: I decided to obtain old medical records.  Old Records Summarized: records from previous admission(s) and records from clinic  visits.  Initial Assessment:   79 y.o. male with palpitations, shortness of breath, chest pressure.  Differential Diagnosis:   Ddx includes ACS, CHF, pacemaker malfunction, PNA, other.  Independently Interpreted Test(s):   I have ordered and independently interpreted X-rays - see prior notes.  I have ordered and independently interpreted EKG Reading(s) - see prior notes  Clinical Tests:   Lab Tests: Reviewed and Ordered  Radiological Study: Ordered and Reviewed  Medical Tests: Ordered and Reviewed  ED Management:  EKG no STEMI. Paced. Frequent PVCs.    CXR NAD.    Labs reassuring.    Patient's HR very variable in ED, 44 to 60 to 71 on EKG.     Due to abnormal EKG and change from prior associated with shortness of breath and L anterior chest discomfort, I have admitted patient for telemetry monitoring and cardiology eval.     I discussed patient with Dr. Butler, nocturnist for hospital medicine, who has written orders.   Other:   I have discussed this case with another health care provider.                                 Clinical Impression:       ICD-10-CM ICD-9-CM   1. Dysrhythmia  I49.9 427.9   2. Chest pain  R07.9 786.50   3. CAD (coronary artery disease)  I25.10 414.00             ED Disposition Condition    Admit                           Fidelia Martin MD  09/06/20 3692

## 2020-09-06 NOTE — ASSESSMENT & PLAN NOTE
Status post pacemaker.  [employer].  Follows Dr. Monteiro at the [facility].  Have called the pacemaker check to interrogate the pacemaker.  On tele monitoring it appears to be pacing.

## 2020-09-06 NOTE — ASSESSMENT & PLAN NOTE
Hold Toprol xl 100mg po qday (Patient with complete heart block)   -Not sure that this is truly contributing to bradycardia though if CHB  Valsartan 160mg po qday

## 2020-09-06 NOTE — CARE UPDATE
I have seen and examined Mr Doll today. Presents with symptomatic bradycardia likely due to pacemaker malfunction. Is on metoprolol at home which has been held. BP stable and patient appears clinically stable. HR persistently 40s-50s. No electrolyte abnormalities that can explain this. No evidence of ACS, volume overload or hypothyroidism. Holding NOAC pending Cardiology consultation. Patient is NPO

## 2020-09-07 VITALS
HEART RATE: 62 BPM | RESPIRATION RATE: 16 BRPM | BODY MASS INDEX: 29.58 KG/M2 | DIASTOLIC BLOOD PRESSURE: 70 MMHG | SYSTOLIC BLOOD PRESSURE: 143 MMHG | HEIGHT: 75 IN | WEIGHT: 237.88 LBS | OXYGEN SATURATION: 97 % | TEMPERATURE: 98 F

## 2020-09-07 PROBLEM — R00.1 BRADYCARDIA: Status: ACTIVE | Noted: 2020-09-07

## 2020-09-07 PROCEDURE — 25000003 PHARM REV CODE 250: Performed by: INTERNAL MEDICINE

## 2020-09-07 PROCEDURE — G0378 HOSPITAL OBSERVATION PER HR: HCPCS

## 2020-09-07 RX ORDER — METOPROLOL SUCCINATE 100 MG/1
100 TABLET, EXTENDED RELEASE ORAL DAILY PRN
Qty: 90 TABLET | Refills: 3
Start: 2020-09-07 | End: 2020-09-14

## 2020-09-07 RX ADMIN — VALSARTAN 160 MG: 80 TABLET, FILM COATED ORAL at 08:09

## 2020-09-07 RX ADMIN — APIXABAN 5 MG: 5 TABLET, FILM COATED ORAL at 10:09

## 2020-09-07 NOTE — PROGRESS NOTES
[facility] CASE MANAGEMENT                  WRITTEN DISCHARGE INFORMATION      APPOINTMENTS AND RESOURCES TO HELP YOU MANAGE YOUR CARE AT HOME BASED ON YOUR PREFERENCES:  Follow-up Information     Anthony Denton MD On 9/15/2020.    Specialties: Internal Medicine, Wound Care  Why: Outpatient Services: [facility] with PCP at 1:15 pm.   Contact information:  [place] [address]  [phone]             Bry Monteiro MD On 11/6/2020.    Specialties: Electrophysiology, Cardiology  Why: Outpatient Services: [facility] with Cardiologist at 8:20 am. Patient on waitlist for earlier appointment.   [facility] information:  [address] [facility] [address]  [phone]             Bry Monteiro MD On 10/15/2020.    Specialties: Electrophysiology, Cardiology  Why: 90 Day Remote Cardiac Device Check at 10:00 am.   Contact information:  [address] [facility] [address]  [phone]                 Healthy Living Instructions to HELP MANAGE YOUR CARE AT HOME:  Things You are responsible for:  1.    Getting your prescriptions filled   2.    Taking your medications as directed, DO NOT MISS ANY DOSES!  3.    Following the diet and exercise recommended by your doctor  4.    Going to your follow-up doctor appointment. This is important because it allows the doctor to monitor your progress and determine if any changes need to made to your treatment plan.  5. If you have any questions about MANAGING YOUR CARE AT HOME Call the Nurse Care Line for 24/7 Assistance [phone]       Please answer any calls you may receive from [facility]. We want to continue to support you as you manage your healthcare needs. [facility] is happy to have the opportunity to serve you.      Thank you for choosing [facility] for your healthcare needs!  Your [facility] Case Management Team,    Clara Samuel   II  Care Management  [phone]  [phone]

## 2020-09-07 NOTE — NURSING
Report received from KARL Chaney. Visualized patient and assessed patient's overall condition and appearance. No acute distress noted. Will continue to monitor

## 2020-09-07 NOTE — PLAN OF CARE
Problem: Adult Inpatient Plan of Care  Goal: Plan of Care Review  Flowsheets (Taken 9/7/2020 0050)  Plan of Care Reviewed With: patient

## 2020-09-07 NOTE — NURSING
Report given to oncoming nurse Ritu RN, Patient is awake and alert Ritu RN. Bed is in lowest position, wheels locked, call light is in reach. 12 hour chart check completed

## 2020-09-07 NOTE — PLAN OF CARE
Problem: Adult Inpatient Plan of Care  Goal: Plan of Care Review  Outcome: Met  Goal: Patient-Specific Goal (Individualization)  Outcome: Met  Goal: Absence of Hospital-Acquired Illness or Injury  Outcome: Met  Goal: Optimal Comfort and Wellbeing  Outcome: Met  Goal: Readiness for Transition of Care  Outcome: Met  Goal: Rounds/Family Conference  Outcome: Met     Problem: Fall Injury Risk  Goal: Absence of Fall and Fall-Related Injury  Outcome: Met     Problem: Arrhythmia/Dysrhythmia  Goal: Normalized Cardiac Rhythm  Outcome: Met     Problem: Adjustment to Illness (Cardiac Rhythm Management Device)  Goal: Optimal Adjustment to Device Presence  Outcome: Met     Problem: Bleeding (Cardiac Rhythm Management Device)  Goal: Absence of Bleeding  Outcome: Met     Problem: Device-Related Complication Risk (Cardiac Rhythm Management Device)  Goal: Effective Device Function  Outcome: Met     Problem: Infection (Cardiac Rhythm Management Device)  Goal: Absence of Infection Signs/Symptoms  Outcome: Met     Problem: Pain (Cardiac Rhythm Management Device)  Goal: Acceptable Pain Level  Outcome: Met     Problem: Respiratory Compromise (Cardiac Rhythm Management Device)  Goal: Effective Respiratory Effort and Oxygenation  Outcome: Met     Problem: Infection  Goal: Infection Symptom Resolution  Outcome: Met

## 2020-09-07 NOTE — NURSING
Report received from Elizabeth LEPE LPN. Patient care assumed. Patient is AAOx4 and observed lying in bed with cardiac monitoring in progress. Vital signs stable and found in flow sheets with complete patient assessment. Skin dry and intact with a 20g RAC. No complaints of pain and no signs of respiratory distress noted. Call light in reach and patient instructed to inform the nurse if anything is needed. Patient stable and will continue to be monitored.

## 2020-09-07 NOTE — PLAN OF CARE
"EDUCATION:  SW provided patient with educational information on Bradycardia.  Information was reviewed and placed in "My Healthcare Packet" to be brought home for use as a resource after discharge.  Information included: signs and symptoms to look for and call the doctor if experiencing, and symptoms that may indicate a medical emergency: CALL 911.  All questions answered.  Teach back method used. Patient stated that he/she will remember the following signs and symptoms: heart rate under 60 beats per minute and fatigue. NKECHI gave patient follow up appointments for Cardiologist and PCP. NKECHI spoke with nurse Machuca and informed her that patient was ready for discharge from  standpoint.        09/07/20 1140   Final Note   Assessment Type Final Discharge Note   Anticipated Discharge Disposition Home   What phone number can be called within the next 1-3 days to see how you are doing after discharge? [phone]   Hospital Follow Up  Appt(s) scheduled? Yes   Discharge plans and expectations educations in teach back method with documentation complete? Yes   Right Care Referral Info   Post Acute Recommendation No Care   Post-Acute Status   Post-Acute Authorization Other   Other Status No Post-Acute Service Needs   Discharge Delays None known at this time                                                       "

## 2020-09-08 ENCOUNTER — TELEPHONE (OUTPATIENT)
Dept: ELECTROPHYSIOLOGY | Facility: CLINIC | Age: 80
End: 2020-09-08

## 2020-09-08 ENCOUNTER — TELEPHONE (OUTPATIENT)
Dept: CARDIOLOGY | Facility: HOSPITAL | Age: 80
End: 2020-09-08

## 2020-09-08 ENCOUNTER — HOSPITAL ENCOUNTER (EMERGENCY)
Facility: HOSPITAL | Age: 80
Discharge: HOME | End: 2020-09-08
Attending: EMERGENCY MEDICINE
Payer: MEDICARE

## 2020-09-08 VITALS
RESPIRATION RATE: 20 BRPM | DIASTOLIC BLOOD PRESSURE: 62 MMHG | HEIGHT: 72 IN | BODY MASS INDEX: 33.05 KG/M2 | SYSTOLIC BLOOD PRESSURE: 134 MMHG | WEIGHT: 244 LBS | TEMPERATURE: 98 F | HEART RATE: 60 BPM | OXYGEN SATURATION: 95 %

## 2020-09-08 DIAGNOSIS — R00.1 BRADYCARDIA: Primary | ICD-10-CM

## 2020-09-08 DIAGNOSIS — I44.2 ATRIOVENTRICULAR BLOCK, COMPLETE: ICD-10-CM

## 2020-09-08 DIAGNOSIS — I48.0 PAROXYSMAL ATRIAL FIBRILLATION: Primary | ICD-10-CM

## 2020-09-08 DIAGNOSIS — T50.905A MEDICATION REACTION, INITIAL ENCOUNTER: ICD-10-CM

## 2020-09-08 DIAGNOSIS — R07.9 CHEST PAIN: ICD-10-CM

## 2020-09-08 LAB
ALBUMIN SERPL BCP-MCNC: 4.5 G/DL (ref 3.5–5.2)
ALP SERPL-CCNC: 51 U/L (ref 55–135)
ALT SERPL W/O P-5'-P-CCNC: 42 U/L (ref 10–44)
ANION GAP SERPL CALC-SCNC: 9 MMOL/L (ref 8–16)
AST SERPL-CCNC: 34 U/L (ref 10–40)
BASOPHILS # BLD AUTO: 0.03 K/UL (ref 0–0.2)
BASOPHILS NFR BLD: 0.5 % (ref 0–1.9)
BILIRUB SERPL-MCNC: 1.1 MG/DL (ref 0.1–1)
BNP SERPL-MCNC: 35 PG/ML (ref 0–99)
BUN SERPL-MCNC: 14 MG/DL (ref 8–23)
CALCIUM SERPL-MCNC: 10.1 MG/DL (ref 8.7–10.5)
CHLORIDE SERPL-SCNC: 105 MMOL/L (ref 95–110)
CO2 SERPL-SCNC: 24 MMOL/L (ref 23–29)
CREAT SERPL-MCNC: 0.9 MG/DL (ref 0.5–1.4)
DIFFERENTIAL METHOD: ABNORMAL
EOSINOPHIL # BLD AUTO: 0.3 K/UL (ref 0–0.5)
EOSINOPHIL NFR BLD: 5.3 % (ref 0–8)
ERYTHROCYTE [DISTWIDTH] IN BLOOD BY AUTOMATED COUNT: 14.6 % (ref 11.5–14.5)
EST. GFR  (AFRICAN AMERICAN): >60 ML/MIN/1.73 M^2
EST. GFR  (NON AFRICAN AMERICAN): >60 ML/MIN/1.73 M^2
GLUCOSE SERPL-MCNC: 109 MG/DL (ref 70–110)
HCT VFR BLD AUTO: 40.8 % (ref 40–54)
HGB BLD-MCNC: 13.7 G/DL (ref 14–18)
IMM GRANULOCYTES # BLD AUTO: 0.03 K/UL (ref 0–0.04)
IMM GRANULOCYTES NFR BLD AUTO: 0.5 % (ref 0–0.5)
LYMPHOCYTES # BLD AUTO: 2 K/UL (ref 1–4.8)
LYMPHOCYTES NFR BLD: 31.7 % (ref 18–48)
MCH RBC QN AUTO: 31.6 PG (ref 27–31)
MCHC RBC AUTO-ENTMCNC: 33.6 G/DL (ref 32–36)
MCV RBC AUTO: 94 FL (ref 82–98)
MONOCYTES # BLD AUTO: 0.5 K/UL (ref 0.3–1)
MONOCYTES NFR BLD: 8.4 % (ref 4–15)
NEUTROPHILS # BLD AUTO: 3.4 K/UL (ref 1.8–7.7)
NEUTROPHILS NFR BLD: 53.6 % (ref 38–73)
NRBC BLD-RTO: 0 /100 WBC
PLATELET # BLD AUTO: 121 K/UL (ref 150–350)
PMV BLD AUTO: 9.5 FL (ref 9.2–12.9)
POTASSIUM SERPL-SCNC: 4.5 MMOL/L (ref 3.5–5.1)
PROT SERPL-MCNC: 7.4 G/DL (ref 6–8.4)
RBC # BLD AUTO: 4.33 M/UL (ref 4.6–6.2)
SODIUM SERPL-SCNC: 138 MMOL/L (ref 136–145)
TROPONIN I SERPL DL<=0.01 NG/ML-MCNC: 0.01 NG/ML (ref 0–0.03)
WBC # BLD AUTO: 6.4 K/UL (ref 3.9–12.7)

## 2020-09-08 PROCEDURE — 93005 ELECTROCARDIOGRAM TRACING: CPT

## 2020-09-08 PROCEDURE — 83880 ASSAY OF NATRIURETIC PEPTIDE: CPT

## 2020-09-08 PROCEDURE — 99285 PR EMERGENCY DEPT VISIT,LEVEL V: ICD-10-PCS | Mod: ,,, | Performed by: EMERGENCY MEDICINE

## 2020-09-08 PROCEDURE — 80053 COMPREHEN METABOLIC PANEL: CPT

## 2020-09-08 PROCEDURE — 99285 EMERGENCY DEPT VISIT HI MDM: CPT | Mod: ,,, | Performed by: EMERGENCY MEDICINE

## 2020-09-08 PROCEDURE — 99285 EMERGENCY DEPT VISIT HI MDM: CPT | Mod: 25

## 2020-09-08 PROCEDURE — 84484 ASSAY OF TROPONIN QUANT: CPT

## 2020-09-08 PROCEDURE — 85025 COMPLETE CBC W/AUTO DIFF WBC: CPT

## 2020-09-08 PROCEDURE — 93010 ELECTROCARDIOGRAM REPORT: CPT | Mod: ,,, | Performed by: INTERNAL MEDICINE

## 2020-09-08 PROCEDURE — 25000003 PHARM REV CODE 250: Performed by: STUDENT IN AN ORGANIZED HEALTH CARE EDUCATION/TRAINING PROGRAM

## 2020-09-08 PROCEDURE — 93010 EKG 12-LEAD: ICD-10-PCS | Mod: ,,, | Performed by: INTERNAL MEDICINE

## 2020-09-08 RX ORDER — ASPIRIN 325 MG
325 TABLET ORAL
Status: COMPLETED | OUTPATIENT
Start: 2020-09-08 | End: 2020-09-08

## 2020-09-08 RX ADMIN — ASPIRIN 325 MG ORAL TABLET 325 MG: 325 PILL ORAL at 08:09

## 2020-09-08 NOTE — HOSPITAL COURSE
"Mr Doll presented with bradycardia. Initially thought to have symptoms but patient denied ever having symptoms. He was just realized heart beat was slow when he checked after noticing a "clicking sound". Cardiology consulted. Dr Vargas (cardiologist) reviewed his EKGs as well as tele monitoring.  Shows atrial and ventricular paced rhythm, occasional PVCs. Pacemaker interrogated. Once interrogated, Dr Vargas (cardiologist) reported to me that device is working appropriately and cleared for discharge. BP stable on antihypertensive regimen he takes at home. Upon my evaluation, patient's manual HR check was high 50s and remained as so after holding home metoprolol for 2 days. Patient felt well. Advised to hold off on metoprolol unless with symptoms of AFib (I discussed symptoms with patient). Will need follow up with Dr Monteiro at [facility]. SW to arrange prior to discharge.   "

## 2020-09-08 NOTE — TELEPHONE ENCOUNTER
----- Message from Rhonda Lopez sent at 9/8/2020  9:31 AM CDT -----  Regarding: [facility] f/u  Eva,    Pt wife Polina [phone] would like a call in ref to a recent ER visit the pt had. She rs a sooner appt for him to see Le Looney, but she want the nurse to call her.    Thanks

## 2020-09-08 NOTE — TELEPHONE ENCOUNTER
----- Message from Jose De Jesus Higgins MA sent at 9/8/2020  9:50 AM CDT -----  Regarding: FW: heart rate low  Contact: patient's wife Polina    ----- Message -----  From: Lizbet Tapia  Sent: 9/8/2020   9:43 AM CDT  To: [facility]ay[facility] Figueroa Staff  Subject: heart rate low                                   The pt's wife heart rate is very low in the 40's. Please call her back @ [phone]. Thanks, Lizbet

## 2020-09-08 NOTE — DISCHARGE SUMMARY
"[facility]  Discharge Summary      Patient Name: Aldo Doll  MRN: [id number]  Admission Date: 9/5/2020  Hospital Length of Stay: 1 days  Discharge Date and Time:  09/07/2020 10:16 PM  Attending Physician: No att. providers found   Discharging Provider: Deborah Daniel MD  Primary Care Provider: Anthony Denton MD      HPI:   Mr. Doll is an 78yo man with a past medical history of HLD, complete heart block with placement of dual chamber PPM, CAD and HTN. He is s/p dual chamber PPM with generator change on 5/28/2015, non obstructive CAD with mildly depressed EF, AF (CHADS-VASC of 4) on Eliquis.  He states he was in his regular state of health until yesterday when he was sleeping on his right side and had his head/ear over his arm.  He heard a strange pacemaker clicking noise radiating to his ear.  This prompted him to get up and check his pulse.  Over the next hour, his pulse was noted to vary from 80 to 48 to 70, then back to 48.  He never had any symptoms associated with this, but became concerned his battery might be out or that the pacer might be malfunctioning, so he came to the ED.    * No surgery found *      Hospital Course:   Mr Doll presented with bradycardia. Initially thought to have symptoms but patient denied ever having symptoms. He was just realized heart beat was slow when he checked after noticing a "clicking sound". Cardiology consulted. Dr Vargas (cardiologist) reviewed his EKGs as well as tele monitoring.  Shows atrial and ventricular paced rhythm, occasional PVCs. Pacemaker interrogated. Once interrogated, Dr Vargas (cardiologist) reported to me that device is working appropriately and cleared for discharge. BP stable on antihypertensive regimen he takes at home. Upon my evaluation, patient's manual HR check was high 50s and remained as so after holding home metoprolol for 2 days. Patient felt well. Advised to hold off on metoprolol unless with symptoms of " AFib (I discussed symptoms with patient). Will need follow up with Dr Monteiro at [facility]. SW to arrange prior to discharge.      Consults:   Consults (From admission, onward)        Status Ordering Provider     Inpatient consult to Cardiology  Once     Provider:  Andres Vargas MD    Completed JAMES LANDAVERDE        Final Active Diagnoses:    Diagnosis Date Noted POA    PRINCIPAL PROBLEM:  Pacemaker malfunction, initial encounter [T82.111A] 09/06/2020 Yes    Bradycardia [R00.1] 09/07/2020 Yes    Essential hypertension [I10] 05/22/2018 Yes    Current use of long term anticoagulation [Z79.01] 10/06/2014 Not Applicable    CAD (coronary artery disease) [I25.10] 10/18/2012 Yes    AV block, complete [I44.2] 10/18/2012 Yes    Atrial fibrillation [I48.91] 06/27/2012 Yes      Problems Resolved During this Admission:    Diagnosis Date Noted Date Resolved POA    Dysrhythmia [I49.9] 09/06/2020 09/06/2020 Yes       Discharged Condition: good    Disposition: Home or Self Care    Follow Up:  Follow-up Information     Anthony Denton MD On 9/15/2020.    Specialties: Internal Medicine, Wound Care  Why: Outpatient Services: [facility] with PCP at 1:15 pm.   Contact information:  [address] [place] [place] [address]  [phone]             Bry Monteiro MD On 11/6/2020.    Specialties: Electrophysiology, Cardiology  Why: Outpatient Services: [facility] with Cardiologist at 8:20 am. Patient on waitlist for earlier appointment.   Contact information:  [address]  [facility] [address]  [phone]             Bry Monteiro MD On 10/15/2020.    Specialties: Electrophysiology, Cardiology  Why: 90 Day Remote Cardiac Device Check at 10:00 am.   Contact information:  [address]  [facility] [address]  [phone]                 Medications:  Reconciled Home Medications:      Medication List      CHANGE how you take these medications    metoprolol succinate 100 MG 24 hr tablet  Commonly known as:  TOPROL-XL  Take 1 tablet (100 mg total) by mouth daily as needed (for palpitations).  What changed:   · when to take this  · reasons to take this        CONTINUE taking these medications    ELIQUIS 5 mg Tab  Generic drug: apixaban  Take 1 tablet by mouth twice daily     fish oil-omega-3 fatty acids 300-1,000 mg capsule  Take 2 capsules by mouth once daily.     valsartan 160 MG tablet  Commonly known as: DIOVAN  Take 1 tablet (160 mg total) by mouth once daily.        ASK your doctor about these medications    azithromycin 250 MG tablet  Commonly known as: Z-ERASMO  Take 1 tablet (250 mg total) by mouth once daily. Take first 2 tablets together, then 1 every day until finished.            Indwelling Lines/Drains at time of discharge:   Lines/Drains/Airways     None               *  Time spent on the discharge of patient: > 35 minutes  Patient was seen and examined on the date of discharge and determined to be suitable for discharge.         Deborah Daniel MD  Department of Hospital Medicine  [facility]

## 2020-09-09 NOTE — MEDICAL/APP STUDENT
History     Chief Complaint   Patient presents with    Bradycardia     pt reports heart rate going to the 40s at home with dizziness    Chest Pain     reports right sided squeezing pressure to right chest and arm     HPI     78 y/o male with a pacemaker presented to the ED with a slow heart rate. Patient was recently hospitalized yesterday at [facility] for the same complaint. His pacemaker was confirmed to be working at the [facility]. He was told by hospital medicine to take 100 mg of metoprolol if he had signs of atrial fibrillation. He noticed today that his blood pressure while sitting down was 45 bpm but while standing up it went up to 90 bpm. The patient said he checked his pulse after sitting down and that it was still in the 90s. He decided to take metoprolol after being off of it for the past two days. Today the patient also notes that he has been feeling pressure in his chest and left arm. He states that 20 years ago he had a pacemaker inserted after having a heart attack.    Patient has no fever, no SOB, no chest wall tenderness, pain is not positional.      Past Medical History:   Diagnosis Date    *Atrial fibrillation     Anticoagulant long-term use     Arthritis     Atrial fibrillation     Cardiomyopathy     CHF (congestive heart failure)     Coronary artery disease     Heart block     Hyperlipidemia     Hypertension        Past Surgical History:   Procedure Laterality Date    CHOLECYSTECTOMY      HERNIA REPAIR      INSERT / REPLACE / REMOVE PACEMAKER         Family History   Problem Relation Age of Onset    Cancer Mother         breast    Cancer Sister         breast    Mental illness Son         depression       Social History     Tobacco Use    Smoking status: Never Smoker    Smokeless tobacco: Never Used   Substance Use Topics    Alcohol use: Yes     Alcohol/week: 3.0 standard drinks     Types: 3 Glasses of wine per week     Comment: daily    Drug use: No        Review of Systems   Constitutional: Negative for chills, diaphoresis and fever.   Respiratory: Negative.    Cardiovascular: Positive for chest pain (describes it as pressure on chest).   Gastrointestinal: Negative.    Endocrine: Negative.    Genitourinary: Negative.    Musculoskeletal: Negative for arthralgias.   Skin: Negative for color change.   Neurological: Positive for headaches. Negative for dizziness.   Hematological: Negative.    Psychiatric/Behavioral: Negative.        Physical Exam   BP (!) 176/79 (BP Location: Left arm, Patient Position: Lying)   Pulse 69   Temp 98 °F (36.7 °C) (Oral)   Resp 20   Ht 6' (1.829 m)   Wt 110.7 kg (244 lb)   SpO2 96%   BMI 33.09 kg/m²     Physical Exam  Cardio: S1 and S2 present, no murmur/rubs appreciated. Pacemaker palpated. [place] beat not palpable. No chest wall tenderness.  Vascular: radial pulses have regular rate and rhythm. No pitting edema in legs appreciated.  Resp: breath sounds clear bilaterally.  GIT: No abdominal distention, no abdominal tenderness.  ED Course   Medical Decision Making:   History:   I obtained history from: Patient and his wife.        Initial Assessment:     Aldo Doll is a 79 y.o. presenting with history of bradycardia.    Differential Diagnosis:   Differential diagnosis includes was not limited to: myocardial infarction, medication induced bradycardia, pacemaker malfunction     Clinical Tests:   Lab Tests: Ordered and Reviewed  Radiological Study: Ordered and Reviewed  Medical Tests: Ordered and Reviewed    Other:   I have discussed this case with another health care provider.    ED Course:  Patient arrived to ED 1 day after being hospitalized for bradycardia.             Impression:      Disposition:

## 2020-09-09 NOTE — DISCHARGE INSTRUCTIONS
Today your evaluation including ECG, labs and x-ray did not show any abnormalities.  Please follow-up with your cardiologist this week for repeat evaluation.  If you have any worsening symptoms, chest pain, lightheadedness, dizziness or any concerns follow-up sooner or return to the emergency department.  Please follow directions from your cardiologist, and avoid taking your metoprolol.    Our goal in the emergency department is to always give you outstanding care and exceptional service. You may receive a survey by mail or e-mail in the next week regarding your experience in our ED. We would greatly appreciate your completing and returning the survey. Your feedback provides us with a way to recognize our staff who give very good care and it helps us learn how to improve when your experience was below our aspiration of excellence.

## 2020-09-09 NOTE — ED TRIAGE NOTES
"Aldo Doll, an 79 y.o. male presents to the ED c/o Chest pain and Bradycardia at home. Pt was told to stop taking metoprolol if HR <60. Pt checked HR at home and it was in the 80s. After taking metoprolol HR went to 40s. Pacemaker in place.       Chief Complaint   Patient presents with    Bradycardia     pt reports heart rate going to the 40s at home with dizziness    Chest Pain     reports right sided squeezing pressure to right chest and arm     Review of patient's allergies indicates:   Allergen Reactions    Pradaxa [dabigatran etexilate] Itching and Rash    Ace inhibitors      Other reaction(s): cough    Chocolate flavor      Other reaction(s): Rash     Past Medical History:   Diagnosis Date    *Atrial fibrillation     Anticoagulant long-term use     Arthritis     Atrial fibrillation     Cardiomyopathy     CHF (congestive heart failure)     Coronary artery disease     Heart block     Hyperlipidemia     Hypertension        LOC: The patient is awake, alert and aware of environment with an appropriate affect, the patient is oriented x 3 and speaking appropriately.   APPEARANCE: Patient appears comfortable and in no acute distress, patient is clean and well groomed.  SKIN: The skin is warm and dry, color consistent with ethnicity, patient has normal skin turgor and moist mucus membranes, skin intact, no breakdown or bruising noted.   MUSCULOSKELETAL: Patient moving all extremities spontaneously, no swelling noted.  RESPIRATORY: Airway is open and patent, respirations are spontaneous, patient has a normal effort and rate, no accessory muscle use noted.  CARDIAC: Patient has a paced rhythm at 60 with multiple PVCs, subjectively dropped to 40's at home after taking metoprolol. no edema noted, capillary refill < 3 seconds. +Right side chest pain "pressure" to right chest and arm  GASTRO: Soft and non tender to palpation, no distention noted. -NVD  : Pt denies any pain or frequency with " urination.  NEURO: Pt opens eyes spontaneously, behavior appropriate to situation, follows commands, facial expression symmetrical, bilateral hand grasp equal and even, purposeful motor response noted, normal sensation in all extremities when touched with a finger.

## 2020-09-09 NOTE — ED PROVIDER NOTES
Encounter Date: 9/8/2020       History     Chief Complaint   Patient presents with    Bradycardia     pt reports heart rate going to the 40s at home with dizziness    Chest Pain     reports right sided squeezing pressure to right chest and arm     Mr. Doll is a 80 yo M with active medical problems of afib (currently on Eliquis), Cardiomyopathy, HFpEF (EF 50-55%), complete AV block s/p pacemaker presents for symptomatic bradycardia. Of note, pt was discharged yesterday from [facility] for the same complaints. His pacemaker was evaluated and determined to be functionally appropriately. Pt was also advised to hold metoprolol unless pt has symptoms of afib. Today, his heart rate was elevated this AM around 90s. Pt was concerned and took his metoprolol 100 mg. Heart rate then decreased into the 40s with symptoms of chest pressure with radiation to his back and left arm, lightheadedness and dizziness which brought the pt to [facility] [place] [place] ED today. Pt denies any sob, nausea, vomiting, diarrhea, constipation, abdominal pain, blurry vision. Exerts bifrontal headache.     Of note, pt has an appointment with cardiology 9/14 with device check on the same date.         Review of patient's allergies indicates:   Allergen Reactions    Pradaxa [dabigatran etexilate] Itching and Rash    Ace inhibitors      Other reaction(s): cough    Chocolate flavor      Other reaction(s): Rash     Past Medical History:   Diagnosis Date    *Atrial fibrillation     Anticoagulant long-term use     Arthritis     Atrial fibrillation     Cardiomyopathy     CHF (congestive heart failure)     Coronary artery disease     Heart block     Hyperlipidemia     Hypertension      Past Surgical History:   Procedure Laterality Date    CHOLECYSTECTOMY      HERNIA REPAIR      INSERT / REPLACE / REMOVE PACEMAKER       Family History   Problem Relation Age of Onset    Cancer Mother         breast    Cancer Sister         breast    Mental  illness Son         depression     Social History     Tobacco Use    Smoking status: Never Smoker    Smokeless tobacco: Never Used   Substance Use Topics    Alcohol use: Yes     Alcohol/week: 3.0 standard drinks     Types: 3 Glasses of wine per week     Comment: daily    Drug use: No     Review of Systems   Constitutional: Negative for fever.   HENT: Negative for sore throat.    Respiratory: Positive for chest tightness. Negative for shortness of breath.    Cardiovascular: Negative for chest pain.   Gastrointestinal: Negative for abdominal pain, constipation, diarrhea, nausea and vomiting.   Genitourinary: Negative for dysuria.   Musculoskeletal: Negative for back pain.   Skin: Negative for rash.   Neurological: Positive for dizziness and light-headedness. Negative for weakness.   Hematological: Does not bruise/bleed easily.       Physical Exam     Initial Vitals [09/08/20 1918]   BP Pulse Resp Temp SpO2   (!) 182/93 78 18 98 °F (36.7 °C) 97 %      MAP       --         Physical Exam    Nursing note and vitals reviewed.  Constitutional: He appears well-developed and well-nourished.   HENT:   Head: Normocephalic and atraumatic.   Eyes: Conjunctivae are normal. Pupils are equal, round, and reactive to light. Right eye exhibits no discharge. Left eye exhibits no discharge.   Neck: Neck supple. No JVD present.   Cardiovascular: Normal rate, regular rhythm, S1 normal, normal heart sounds and normal pulses.   No murmur heard.  Pulmonary/Chest: Breath sounds normal. He has no rales.   Abdominal: Soft. Normal appearance and bowel sounds are normal. There is no abdominal tenderness. There is no rebound and no guarding.   Musculoskeletal: Normal range of motion. Edema (+1 pitting edema bilaterally ) present.   Neurological: He is alert and oriented to person, place, and time.   Skin: Skin is warm and dry.         ED Course   Procedures  Labs Reviewed   CBC W/ AUTO DIFFERENTIAL - Abnormal; Notable for the following  components:       Result Value    RBC 4.33 (*)     Hemoglobin 13.7 (*)     Mean Corpuscular Hemoglobin 31.6 (*)     RDW 14.6 (*)     Platelets 121 (*)     All other components within normal limits   COMPREHENSIVE METABOLIC PANEL - Abnormal; Notable for the following components:    Total Bilirubin 1.1 (*)     Alkaline Phosphatase 51 (*)     All other components within normal limits   TROPONIN I   B-TYPE NATRIURETIC PEPTIDE     EKG Readings: (Independently Interpreted)   Initial Reading: No STEMI. Previous EKG: Compared with most recent EKG Previous EKG Date: 09/05/2020. Heart Rate: 63.   AV dual-paced rhythm     ECG Results          EKG 12-lead (Final result)  Result time 09/09/20 13:44:43    Final result by Interface, Lab In [facility] (09/09/20 13:44:43)                 Narrative:    Test Reason : R07.9,    Vent. Rate : 063 BPM     Atrial Rate : 063 BPM     P-R Int : 154 ms          QRS Dur : 222 ms      QT Int : 496 ms       P-R-T Axes : 095 -72 094 degrees     QTc Int : 507 ms    AV dual-paced rhythm  Abnormal ECG  When compared with ECG of 05-SEP-2020 22:34,  Premature ventricular complexes are no longer Present  Vent. rate has decreased BY   8 BPM  Confirmed by Dylan TORO, Nik STANLEY ([id number]) on 9/9/2020 1:44:37 PM    Referred By: [email or web address]   SELF           Confirmed By:Nik Richardson MD                            Imaging Results          X-Ray Chest AP Portable (Final result)  Result time 09/08/20 20:50:58    Final result by Sg Maciel MD (09/08/20 20:50:58)                 Impression:      No acute radiographic abnormality.      Electronically signed by: Sg Maciel  Date:    09/08/2020  Time:    20:50             Narrative:    EXAMINATION:  XR CHEST AP PORTABLE    CLINICAL HISTORY:  Chest Pain;    TECHNIQUE:  Single frontal view of the chest was performed.  Four images are submitted    COMPARISON:  09/05/2020    FINDINGS:  Pacemaker device on the left.The lungs are clear, with normal appearance of pulmonary  vasculature and no pleural effusion or pneumothorax.    The cardiac silhouette is normal in size. The hilar and mediastinal contours are unremarkable.    Bones are intact.                              X-Rays:   Independently Interpreted Readings:   Chest X-Ray: Normal heart size.  No acute abnormalities. There is a pacemaker present in the left upper chest.     Medical Decision Making:   History:   I obtained history from: someone other than patient.  Old Medical Records: I decided to obtain old medical records.  Old Records Summarized: records from clinic visits, records from previous admission(s) and other records.  Initial Assessment:   Mr. Doll is a 80 yo M with active medical problems of afib (currently on Eliquis), Cardiomyopathy, HFpEF (EF 50-55%), complete AV block s/p pacemaker presents for symptomatic bradycardia.   Differential Diagnosis:   Possible differentials but not limited to:   Bradycardia secondary to medication (metoprolol)  ACS   Pacemaker malfunction although unlikely given last check up was yesterday     Independently Interpreted Test(s):   I have ordered and independently interpreted X-rays - see prior notes.  I have ordered and independently interpreted EKG Reading(s) - see prior notes  Clinical Tests:   Lab Tests: Ordered and Reviewed  Radiological Study: Ordered and Reviewed  Medical Tests: Ordered and Reviewed  ED Management:  Chest pain workup ordered: troponin, BNP, CXR, CBC, CMP. Aspirin 325 mg given. Rate is currently 60-70 bpm.          APC / Resident Notes:   9:05 PM  CBC: no leukocytosis, hemoglobin 13.7, currently at baseline   10:10 PM  CMP: no electrolytes abnormality, kidney function and liver function are normal. Troponin and BNP unremarkable   10:16 PM  Discussed findings with pt and wife. Pt is advised to d/c his metoprolol.  Pt is advised to follow up with his cardiologist Dr. Monteiro. Deborah for discharge. Both pt and wife are receptive of disposition.           Attending Attestation:   Physician Attestation Statement for Resident:  As the supervising MD   Physician Attestation Statement: I have personally seen and examined this patient.   I agree with the above history. -:   As the supervising MD I agree with the above PE.    As the supervising MD I agree with the above treatment, course, plan, and disposition.             I have reviewed and concur with the resident's history, physical, assessment, and plan.  I have personally interviewed and examined the patient at bedside.   I did supervise any and all procedures and was present for any critical portion, and was always immediately available for help and as a resource.         Complexity: High - level 5    Final diagnoses:  [R07.9] Chest pain  [R00.1] Bradycardia (Primary)  [T50.905A] Medication reaction, initial encounter       Eddie Garza DO, NELLEM    Emergency Medicine Physician  Dept of Emergency Medicine   [facility]  Spectralink: [id number]                      Clinical Impression:       ICD-10-CM ICD-9-CM   1. Bradycardia  R00.1 427.89   2. Chest pain  R07.9 786.50   3. Medication reaction, initial encounter  T50.905A E947.9         Disposition:   Disposition: Discharged  Condition: Stable     ED Disposition Condition    Discharge Stable        ED Prescriptions     None        Follow-up Information     Follow up With Specialties Details Why Contact Info    Bry Monteiro MD Electrophysiology, Cardiology Schedule an appointment as soon as possible for a visit in 1 week Please call this week for follow-up [phone] [facility] [phone]  [phone]                                         Maribell Henderson DO  Resident  09/08/20 2215       Eddie Garza DO  09/09/20 [id number]

## 2020-09-10 NOTE — PROGRESS NOTES
Mr. Doll is a patient of Dr. Monteiro and was last seen in clinic 5/3/2019.      Subjective:   Patient ID:  Aldo Doll is a 79 y.o. male who presents for follow-up of Atrial Fibrillation  .     HPI:    Mr. Doll is an 79y.o. male with pAF, AFL (atypical), HTN, NICM, AVB, PPM (2001) here for annual follow up.    Background:      History of paroxysmal asymptomatic afib, high grade AV block, HTN, and NICM.     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  CHADS=2 on coumadin. He reported a rash with pradaxa and xarelto.     He has felt very well while in atypical AFL on prior visits.  Today, NSR with V pacing.    Echo 4/13: EF 55% 6/14: 45-50%. 10/2016: 40%  There may be a signal of decreasing LVEF, so will intensify frequency of echo. If LVEF stays ~40% or worsens, would likely upgrade to biV PPM. At this point, though, there's no sx at all.  Patient felt well at clinic visit 5/2019. EF stable at 40-45%.    Update (09/14/2020):    9/5/2020 Patient states that yesterday he noted that his heart rate was low.  He also felt an irregular heartbeat.  Hence this decided to come to the ER.  Cardiology consulted. Dr Vargas (cardiologist) reviewed his EKGs as well as tele monitoring.  Shows atrial and ventricular paced rhythm, occasional PVCs. Pacemaker interrogated. Once interrogated, Dr Vargas (cardiologist) reported that device is working appropriately and pt was cleared for discharge.     9/8/2020 Pt again went to ED with worsening low heart rate per monitor. He was advised to d/c his metoprolol.       Today he says that for the past 2 weeks he has felt more irregular heartbeats. Feels uncomfortable but he denies LH. Denies other cardiac symptoms, including CP, JOY, orthopnea, syncope.    He is currently taking eliquis 5mg BID for stroke prophylaxis and denies significant bleeding episodes. He is  currently being treated with metoprolol 100mg daily (currently on hold) for HR control.  Kidney function is stable, with a creatinine of 0.9 on 9/8/2020.    Device Interrogation (9/14/2020) reveals an intrinsic SR with CHB and frequent PVCs with stable lead and device function. No arrhythmias or treated episodes were noted.  He paces 43.4% in the RA and 98% in the RV. Estimated battery longevity 3.5 years.     I have personally reviewed the patient's EKG today, which shows APVP with frequent PVCs at 74bpm. ME interval is 124. QRS is 166. QTc is 530.    Recent Cardiac Tests:    2D Echo (9/6/2020):  · Low normal left ventricular systolic function. The estimated ejection fraction is 50%.  · Indeterminate left ventricular diastolic function.  · Septal wall has abnormal motion consistent with right ventricular pacemaker.  · Moderate left atrial enlargement.  · Normal right ventricular systolic function.  · Mild right atrial enlargement.  · Mild aortic regurgitation.  · Normal central venous pressure (3 mmHg).  · The estimated PA systolic pressure is 33 mmHg.  · Mild concentric left ventricular hypertrophy.    Current Outpatient Medications   Medication Sig    ELIQUIS 5 mg Tab Take 1 tablet by mouth twice daily    fish oil-omega-3 fatty acids 300-1,000 mg capsule Take 2 capsules by mouth once daily.     valsartan (DIOVAN) 160 MG tablet Take 1 tablet (160 mg total) by mouth once daily.    azithromycin (Z-ERASMO) 250 MG tablet Take 1 tablet (250 mg total) by mouth once daily. Take first 2 tablets together, then 1 every day until finished.    metoprolol succinate (TOPROL-XL) 100 MG 24 hr tablet Take 1 tablet (100 mg total) by mouth daily as needed (for palpitations). (Patient not taking: Reported on 9/14/2020)     No current facility-administered medications for this visit.      Review of Systems   Constitution: Negative for malaise/fatigue.   Cardiovascular: Positive for irregular heartbeat. Negative for chest pain,  dyspnea on exertion, leg swelling and palpitations.   Respiratory: Negative for shortness of breath.    Hematologic/Lymphatic: Negative for bleeding problem.   Skin: Negative for rash.   Musculoskeletal: Negative for myalgias.   Gastrointestinal: Negative for hematemesis, hematochezia and nausea.   Genitourinary: Negative for hematuria.   Neurological: Negative for light-headedness.   Psychiatric/Behavioral: Negative for altered mental status.   Allergic/Immunologic: Negative for persistent infections.     Objective:        BP (!) 142/60   Pulse 74   Ht 6' (1.829 m)   Wt 112.6 kg (248 lb 3.8 oz)   BMI 33.67 kg/m²     Physical Exam   Constitutional: He is oriented to person, place, and time. He appears well-developed and well-nourished.   HENT:   Head: Normocephalic.   Nose: Nose normal.   Eyes: Pupils are equal, round, and reactive to light.   Cardiovascular: Normal rate, regular rhythm, S1 normal and S2 normal.   No murmur heard.  Pulses:       Radial pulses are 2+ on the right side and 2+ on the left side.   Pulmonary/Chest: Breath sounds normal. No respiratory distress.   Device to LUCW. Incision and pocket in good repair.   Abdominal: Normal appearance.   Musculoskeletal: Normal range of motion.         General: No edema.   Neurological: He is alert and oriented to person, place, and time.   Skin: Skin is warm and dry. No erythema.   Psychiatric: He has a normal mood and affect. His speech is normal and behavior is normal.   Nursing note and vitals reviewed.    Lab Results   Component Value Date     09/08/2020    K 4.5 09/08/2020    MG 2.2 09/05/2020    BUN 14 09/08/2020    CREATININE 0.9 09/08/2020    ALT 42 09/08/2020    AST 34 09/08/2020    HGB 13.7 (L) 09/08/2020    HCT 40.8 09/08/2020    TSH 3.013 09/05/2020    LDLCALC 167.8 (H) 02/21/2020       Recent Labs   Lab 02/21/20  0806 07/24/20 2038 09/05/20  2237   INR 1.0 1.0 0.9         Assessment:     1. Paroxysmal atrial fibrillation    2. AV block,  complete    3. Cardiomyopathy, unspecified type    4. Essential hypertension    5. Primary cardiomyopathy    6. Anticoagulation monitoring by pharmacist    7. Current use of long term anticoagulation    8. Cardiac pacemaker in situ    9. PVC's (premature ventricular contractions)      Plan:     In summary, Mr. Doll is an 79y.o. male with pAF, AFL (atypical), HTN, NICM, AVB, PPM (2001) here for annual follow up.  Mr. Doll is doing well from a device perspective with stable lead and device function. CHB with 99% RV pacing and no CHF symptoms. No sustained arrhythmia noted. He is having frequent PVCs, sometimes in bigeminal pattern, likely causing false bradycardia readings on his home BP monitor. Metoprolol was stopped and his symptoms have worsened. Will restart metoprolol for PVC suppression and for GDMT. Pt with hx of NICM, although recent echo shows EF of 50%. Reassurance provided that his base rate is 60 and his device is working appropriately. He remains on eliquis for CVA prophylaxis.    Restart metoprolol 50mg BID.  Holter in 3 weeks to assess PVC burden.  Continue current medication regimen and device settings.   Follow up in device clinic as scheduled.   Follow up in EP clinic in 1 year, sooner as needed.     *A copy of this note has been sent to Dr. Monteiro*    Follow up in about 1 year (around 9/14/2021).    ------------------------------------------------------------------    DEMETRICE Carvalho, NP-C  Cardiac Electrophysiology

## 2020-09-13 ENCOUNTER — PATIENT OUTREACH (OUTPATIENT)
Dept: ADMINISTRATIVE | Facility: OTHER | Age: 80
End: 2020-09-13

## 2020-09-14 ENCOUNTER — CLINICAL SUPPORT (OUTPATIENT)
Dept: CARDIOLOGY | Facility: HOSPITAL | Age: 80
End: 2020-09-14
Attending: INTERNAL MEDICINE
Payer: MEDICARE

## 2020-09-14 ENCOUNTER — OFFICE VISIT (OUTPATIENT)
Dept: ELECTROPHYSIOLOGY | Facility: CLINIC | Age: 80
End: 2020-09-14
Payer: MEDICARE

## 2020-09-14 VITALS
DIASTOLIC BLOOD PRESSURE: 60 MMHG | BODY MASS INDEX: 33.62 KG/M2 | HEIGHT: 72 IN | SYSTOLIC BLOOD PRESSURE: 142 MMHG | HEART RATE: 74 BPM | WEIGHT: 248.25 LBS

## 2020-09-14 DIAGNOSIS — I42.9 CARDIOMYOPATHY, UNSPECIFIED TYPE: ICD-10-CM

## 2020-09-14 DIAGNOSIS — I10 ESSENTIAL HYPERTENSION: ICD-10-CM

## 2020-09-14 DIAGNOSIS — Z79.01 ANTICOAGULATION MONITORING BY PHARMACIST: ICD-10-CM

## 2020-09-14 DIAGNOSIS — I49.3 PVC'S (PREMATURE VENTRICULAR CONTRACTIONS): ICD-10-CM

## 2020-09-14 DIAGNOSIS — Z95.0 CARDIAC PACEMAKER IN SITU: ICD-10-CM

## 2020-09-14 DIAGNOSIS — I48.0 PAROXYSMAL ATRIAL FIBRILLATION: Primary | ICD-10-CM

## 2020-09-14 DIAGNOSIS — I44.2 AV BLOCK, COMPLETE: ICD-10-CM

## 2020-09-14 DIAGNOSIS — I42.9 PRIMARY CARDIOMYOPATHY: Chronic | ICD-10-CM

## 2020-09-14 DIAGNOSIS — I48.0 PAROXYSMAL ATRIAL FIBRILLATION: ICD-10-CM

## 2020-09-14 DIAGNOSIS — I44.2 ATRIOVENTRICULAR BLOCK, COMPLETE: ICD-10-CM

## 2020-09-14 DIAGNOSIS — Z79.01 CURRENT USE OF LONG TERM ANTICOAGULATION: ICD-10-CM

## 2020-09-14 PROCEDURE — 93005 RHYTHM STRIP: ICD-10-PCS | Mod: S$GLB,,, | Performed by: NURSE PRACTITIONER

## 2020-09-14 PROCEDURE — 93005 ELECTROCARDIOGRAM TRACING: CPT | Mod: S$GLB,,, | Performed by: NURSE PRACTITIONER

## 2020-09-14 PROCEDURE — 3078F DIAST BP <80 MM HG: CPT | Mod: S$GLB,,, | Performed by: NURSE PRACTITIONER

## 2020-09-14 PROCEDURE — 1101F PR PT FALLS ASSESS DOC 0-1 FALLS W/OUT INJ PAST YR: ICD-10-PCS | Mod: S$GLB,,, | Performed by: NURSE PRACTITIONER

## 2020-09-14 PROCEDURE — 1125F PR PAIN SEVERITY QUANTIFIED, PAIN PRESENT: ICD-10-PCS | Mod: S$GLB,,, | Performed by: NURSE PRACTITIONER

## 2020-09-14 PROCEDURE — 99999 PR PBB SHADOW E&M-EST. PATIENT-LVL III: ICD-10-PCS | Mod: PBBFAC,,, | Performed by: NURSE PRACTITIONER

## 2020-09-14 PROCEDURE — 1159F MED LIST DOCD IN RCRD: CPT | Mod: S$GLB,,, | Performed by: NURSE PRACTITIONER

## 2020-09-14 PROCEDURE — 93010 RHYTHM STRIP: ICD-10-PCS | Mod: S$GLB,,, | Performed by: INTERNAL MEDICINE

## 2020-09-14 PROCEDURE — 3077F PR MOST RECENT SYSTOLIC BLOOD PRESSURE >= 140 MM HG: ICD-10-PCS | Mod: S$GLB,,, | Performed by: NURSE PRACTITIONER

## 2020-09-14 PROCEDURE — 1125F AMNT PAIN NOTED PAIN PRSNT: CPT | Mod: S$GLB,,, | Performed by: NURSE PRACTITIONER

## 2020-09-14 PROCEDURE — 1159F PR MEDICATION LIST DOCUMENTED IN MEDICAL RECORD: ICD-10-PCS | Mod: S$GLB,,, | Performed by: NURSE PRACTITIONER

## 2020-09-14 PROCEDURE — 3078F PR MOST RECENT DIASTOLIC BLOOD PRESSURE < 80 MM HG: ICD-10-PCS | Mod: S$GLB,,, | Performed by: NURSE PRACTITIONER

## 2020-09-14 PROCEDURE — 93280 PM DEVICE PROGR EVAL DUAL: CPT

## 2020-09-14 PROCEDURE — 3077F SYST BP >= 140 MM HG: CPT | Mod: S$GLB,,, | Performed by: NURSE PRACTITIONER

## 2020-09-14 PROCEDURE — 93280 CARDIAC DEVICE CHECK - IN CLINIC & HOSPITAL: ICD-10-PCS | Mod: 26,,, | Performed by: INTERNAL MEDICINE

## 2020-09-14 PROCEDURE — 93280 PM DEVICE PROGR EVAL DUAL: CPT | Mod: 26,,, | Performed by: INTERNAL MEDICINE

## 2020-09-14 PROCEDURE — 99999 PR PBB SHADOW E&M-EST. PATIENT-LVL III: CPT | Mod: PBBFAC,,, | Performed by: NURSE PRACTITIONER

## 2020-09-14 PROCEDURE — 93010 ELECTROCARDIOGRAM REPORT: CPT | Mod: S$GLB,,, | Performed by: INTERNAL MEDICINE

## 2020-09-14 PROCEDURE — 99214 PR OFFICE/OUTPT VISIT, EST, LEVL IV, 30-39 MIN: ICD-10-PCS | Mod: S$GLB,,, | Performed by: NURSE PRACTITIONER

## 2020-09-14 PROCEDURE — 1101F PT FALLS ASSESS-DOCD LE1/YR: CPT | Mod: S$GLB,,, | Performed by: NURSE PRACTITIONER

## 2020-09-14 PROCEDURE — 99214 OFFICE O/P EST MOD 30 MIN: CPT | Mod: S$GLB,,, | Performed by: NURSE PRACTITIONER

## 2020-09-14 RX ORDER — METOPROLOL SUCCINATE 50 MG/1
50 TABLET, EXTENDED RELEASE ORAL 2 TIMES DAILY
Qty: 60 TABLET | Refills: 11 | Status: ON HOLD | OUTPATIENT
Start: 2020-09-14 | End: 2020-09-18 | Stop reason: SDUPTHER

## 2020-09-15 ENCOUNTER — TELEPHONE (OUTPATIENT)
Dept: ELECTROPHYSIOLOGY | Facility: CLINIC | Age: 80
End: 2020-09-15

## 2020-09-15 ENCOUNTER — OFFICE VISIT (OUTPATIENT)
Dept: FAMILY MEDICINE | Facility: CLINIC | Age: 80
End: 2020-09-15
Payer: MEDICARE

## 2020-09-15 ENCOUNTER — NURSE TRIAGE (OUTPATIENT)
Dept: ADMINISTRATIVE | Facility: CLINIC | Age: 80
End: 2020-09-15

## 2020-09-15 VITALS
TEMPERATURE: 98 F | SYSTOLIC BLOOD PRESSURE: 134 MMHG | OXYGEN SATURATION: 98 % | HEIGHT: 72 IN | WEIGHT: 249.56 LBS | HEART RATE: 82 BPM | RESPIRATION RATE: 17 BRPM | DIASTOLIC BLOOD PRESSURE: 76 MMHG | BODY MASS INDEX: 33.8 KG/M2

## 2020-09-15 DIAGNOSIS — I48.0 PAROXYSMAL ATRIAL FIBRILLATION: Primary | ICD-10-CM

## 2020-09-15 DIAGNOSIS — I10 HYPERTENSION, ESSENTIAL: ICD-10-CM

## 2020-09-15 PROCEDURE — 99214 OFFICE O/P EST MOD 30 MIN: CPT | Mod: S$GLB,,, | Performed by: INTERNAL MEDICINE

## 2020-09-15 PROCEDURE — 1101F PR PT FALLS ASSESS DOC 0-1 FALLS W/OUT INJ PAST YR: ICD-10-PCS | Mod: S$GLB,,, | Performed by: INTERNAL MEDICINE

## 2020-09-15 PROCEDURE — 1101F PT FALLS ASSESS-DOCD LE1/YR: CPT | Mod: S$GLB,,, | Performed by: INTERNAL MEDICINE

## 2020-09-15 PROCEDURE — 3075F SYST BP GE 130 - 139MM HG: CPT | Mod: S$GLB,,, | Performed by: INTERNAL MEDICINE

## 2020-09-15 PROCEDURE — 1159F MED LIST DOCD IN RCRD: CPT | Mod: S$GLB,,, | Performed by: INTERNAL MEDICINE

## 2020-09-15 PROCEDURE — 99999 PR PBB SHADOW E&M-EST. PATIENT-LVL III: CPT | Mod: PBBFAC,,, | Performed by: INTERNAL MEDICINE

## 2020-09-15 PROCEDURE — 3078F DIAST BP <80 MM HG: CPT | Mod: S$GLB,,, | Performed by: INTERNAL MEDICINE

## 2020-09-15 PROCEDURE — 3075F PR MOST RECENT SYSTOLIC BLOOD PRESS GE 130-139MM HG: ICD-10-PCS | Mod: S$GLB,,, | Performed by: INTERNAL MEDICINE

## 2020-09-15 PROCEDURE — 99999 PR PBB SHADOW E&M-EST. PATIENT-LVL III: ICD-10-PCS | Mod: PBBFAC,,, | Performed by: INTERNAL MEDICINE

## 2020-09-15 PROCEDURE — 3078F PR MOST RECENT DIASTOLIC BLOOD PRESSURE < 80 MM HG: ICD-10-PCS | Mod: S$GLB,,, | Performed by: INTERNAL MEDICINE

## 2020-09-15 PROCEDURE — 1126F AMNT PAIN NOTED NONE PRSNT: CPT | Mod: S$GLB,,, | Performed by: INTERNAL MEDICINE

## 2020-09-15 PROCEDURE — 1126F PR PAIN SEVERITY QUANTIFIED, NO PAIN PRESENT: ICD-10-PCS | Mod: S$GLB,,, | Performed by: INTERNAL MEDICINE

## 2020-09-15 PROCEDURE — 1159F PR MEDICATION LIST DOCUMENTED IN MEDICAL RECORD: ICD-10-PCS | Mod: S$GLB,,, | Performed by: INTERNAL MEDICINE

## 2020-09-15 PROCEDURE — 99214 PR OFFICE/OUTPT VISIT, EST, LEVL IV, 30-39 MIN: ICD-10-PCS | Mod: S$GLB,,, | Performed by: INTERNAL MEDICINE

## 2020-09-15 NOTE — PROGRESS NOTES
Subjective:       Patient ID: Aldo Doll is a 79 y.o. male.    Chief Complaint: Hospital Follow Up and Establish Care    Hospital follow up    HPI: 80 y/o w/ afib heart block with PPM presents with wife for hospital follow up for bradycardia. Presented to ED 9/5 after sensation of feeling is heart beat in his head. Had normal functioning pacer and echo showing normal ef. His metoprolol was initially held, he returned to ED one day after discharge for chest pain and palpitations. He was instructed to follow up with his EP cardiologist. He saw ATO Looney in [facility] clinic yesterday and was prescribed metoprolol succinate 50mg to take TWICE per day. This morning (after taking dose lat night) he awoke took a.m. dose of metoprolol succinate and approximately one hour later felt dizzy/lightheaded no palpitations no syncope. He reports when he checked his pulse with home BP monitor heart rate was in 40s with bp of 142/86. When he walked around his house he felt better and heart rate increased to 80's no chest pain no dyspnea no LE swelling no change in breathign when lying down    Review of Systems   Constitutional: Negative for activity change, fever and unexpected weight change.   HENT: Negative for congestion, rhinorrhea, sore throat and trouble swallowing.    Eyes: Negative for photophobia and redness.   Respiratory: Negative for cough, chest tightness, shortness of breath and wheezing.    Cardiovascular: Negative for chest pain, palpitations and leg swelling.   Gastrointestinal: Negative for abdominal pain, blood in stool, constipation, diarrhea, nausea and vomiting.   Endocrine: Negative for cold intolerance, heat intolerance and polyuria.   Genitourinary: Negative for decreased urine volume, difficulty urinating, dysuria and urgency.   Musculoskeletal: Negative for arthralgias and back pain.   Skin: Negative for rash.   Neurological: Negative for dizziness, syncope, weakness and headaches.    Psychiatric/Behavioral: Negative for dysphoric mood, sleep disturbance and suicidal ideas.       Objective:     Vitals:    09/15/20 1326   BP: 134/76   BP Location: Right arm   Patient Position: Sitting   BP Method: Medium (Automatic)   Pulse: 82   Resp: 17   Temp: 97.7 °F (36.5 °C)   TempSrc: Oral   SpO2: 98%   Weight: 113.2 kg (249 lb 9 oz)   Height: 6' (1.829 m)          Physical Exam  Constitutional:       Appearance: He is well-developed.   HENT:      Head: Normocephalic and atraumatic.   Eyes:      General: No scleral icterus.     Conjunctiva/sclera: Conjunctivae normal.   Neck:      Musculoskeletal: Normal range of motion.   Cardiovascular:      Rate and Rhythm: Normal rate and regular rhythm.      Heart sounds: No murmur. No friction rub. No gallop.       Comments: Regular rate in 70's  Pulmonary:      Effort: Pulmonary effort is normal.      Breath sounds: Normal breath sounds. No wheezing or rales.   Abdominal:      General: Bowel sounds are normal.      Palpations: Abdomen is soft.      Tenderness: There is no abdominal tenderness. There is no guarding or rebound.   Musculoskeletal: Normal range of motion.         General: No tenderness.      Right lower leg: No edema.      Left lower leg: No edema.   Skin:     General: Skin is warm and dry.   Neurological:      Mental Status: He is alert and oriented to person, place, and time.      Cranial Nerves: No cranial nerve deficit.         Assessment and Plan   1. Paroxysmal atrial fibrillation  On noac was recently placed on metoprolol succinate bid, will confirm with cardiologist this was the intended formulation and not tartate    2. Hypertension, essential  At [facility] continue arb

## 2020-09-15 NOTE — TELEPHONE ENCOUNTER
Called pt. He stated that he was having increased  BP  and DBP 80. Pt instructed to continue with Metoprolol. Pt also informed that he just started the medication yesterday and it needs time to work. Pt instructed to call clinic for worsening symptoms.

## 2020-09-15 NOTE — TELEPHONE ENCOUNTER
----- Message from Demetris Clayton sent at 9/15/2020  8:52 AM CDT -----  Regarding: Patient is requesting call back regarding his blood pressure  Jennifer,  Ms Doll is requesting a return back regarding Mr Doll blood pressure and medication.

## 2020-09-15 NOTE — Clinical Note
TAO Looney:  I saw Mr. Doll this afternoon. He is feeling well and no longer having any orthostatic symptoms like he had this morning. I noted you had prescribed him 50mg of metoprolol succinate with instructions to take this twice per day. I wanted to confirm this was your intention and not the shorter acting tartate BID.   Very Respectfully  Jackson Denton

## 2020-09-15 NOTE — TELEPHONE ENCOUNTER
Pt states he was expecting a call back from Dr Denton but he missed it, advised him there is no notes in the system about a missed phone call, advised him I would send a message to Dr Denton for someone to reach out to him, advised him if he starts having bradycardia or any symptoms to go to ER caller agreed     Reason for Disposition   Health Information question, no triage required and triager able to answer question    Protocols used: INFORMATION ONLY CALL - NO TRIAGE-A-

## 2020-09-16 ENCOUNTER — TELEPHONE (OUTPATIENT)
Dept: ELECTROPHYSIOLOGY | Facility: CLINIC | Age: 80
End: 2020-09-16

## 2020-09-16 ENCOUNTER — TELEPHONE (OUTPATIENT)
Dept: FAMILY MEDICINE | Facility: CLINIC | Age: 80
End: 2020-09-16

## 2020-09-16 ENCOUNTER — TELEPHONE (OUTPATIENT)
Dept: CARDIOLOGY | Facility: HOSPITAL | Age: 80
End: 2020-09-16

## 2020-09-16 ENCOUNTER — HOSPITAL ENCOUNTER (OUTPATIENT)
Facility: HOSPITAL | Age: 80
Discharge: HOME | End: 2020-09-18
Attending: EMERGENCY MEDICINE | Admitting: HOSPITALIST
Payer: MEDICARE

## 2020-09-16 DIAGNOSIS — I50.22 CHRONIC SYSTOLIC CONGESTIVE HEART FAILURE: ICD-10-CM

## 2020-09-16 DIAGNOSIS — Z79.01 CURRENT USE OF LONG TERM ANTICOAGULATION: ICD-10-CM

## 2020-09-16 DIAGNOSIS — I48.0 PAROXYSMAL ATRIAL FIBRILLATION: ICD-10-CM

## 2020-09-16 DIAGNOSIS — I44.2 AV BLOCK, COMPLETE: ICD-10-CM

## 2020-09-16 DIAGNOSIS — I49.3 PVC'S (PREMATURE VENTRICULAR CONTRACTIONS): Primary | ICD-10-CM

## 2020-09-16 DIAGNOSIS — R00.1 BRADYCARDIA: ICD-10-CM

## 2020-09-16 DIAGNOSIS — I49.9 IRREGULAR CARDIAC RHYTHM: ICD-10-CM

## 2020-09-16 LAB
ALBUMIN SERPL BCP-MCNC: 4.3 G/DL (ref 3.5–5.2)
ALP SERPL-CCNC: 54 U/L (ref 55–135)
ALT SERPL W/O P-5'-P-CCNC: 40 U/L (ref 10–44)
ANION GAP SERPL CALC-SCNC: 10 MMOL/L (ref 8–16)
AST SERPL-CCNC: 29 U/L (ref 10–40)
BASOPHILS # BLD AUTO: 0.04 K/UL (ref 0–0.2)
BASOPHILS NFR BLD: 0.7 % (ref 0–1.9)
BILIRUB SERPL-MCNC: 0.9 MG/DL (ref 0.1–1)
BUN SERPL-MCNC: 22 MG/DL (ref 8–23)
CALCIUM SERPL-MCNC: 9.7 MG/DL (ref 8.7–10.5)
CHLORIDE SERPL-SCNC: 104 MMOL/L (ref 95–110)
CO2 SERPL-SCNC: 23 MMOL/L (ref 23–29)
CREAT SERPL-MCNC: 1 MG/DL (ref 0.5–1.4)
DIFFERENTIAL METHOD: ABNORMAL
EOSINOPHIL # BLD AUTO: 0.3 K/UL (ref 0–0.5)
EOSINOPHIL NFR BLD: 5.9 % (ref 0–8)
ERYTHROCYTE [DISTWIDTH] IN BLOOD BY AUTOMATED COUNT: 14.2 % (ref 11.5–14.5)
EST. GFR  (AFRICAN AMERICAN): >60 ML/MIN/1.73 M^2
EST. GFR  (NON AFRICAN AMERICAN): >60 ML/MIN/1.73 M^2
GLUCOSE SERPL-MCNC: 105 MG/DL (ref 70–110)
HCT VFR BLD AUTO: 40.5 % (ref 40–54)
HGB BLD-MCNC: 13.5 G/DL (ref 14–18)
IMM GRANULOCYTES # BLD AUTO: 0.02 K/UL (ref 0–0.04)
IMM GRANULOCYTES NFR BLD AUTO: 0.4 % (ref 0–0.5)
LYMPHOCYTES # BLD AUTO: 1.7 K/UL (ref 1–4.8)
LYMPHOCYTES NFR BLD: 31.2 % (ref 18–48)
MAGNESIUM SERPL-MCNC: 2 MG/DL (ref 1.6–2.6)
MCH RBC QN AUTO: 31.8 PG (ref 27–31)
MCHC RBC AUTO-ENTMCNC: 33.3 G/DL (ref 32–36)
MCV RBC AUTO: 95 FL (ref 82–98)
MONOCYTES # BLD AUTO: 0.5 K/UL (ref 0.3–1)
MONOCYTES NFR BLD: 8.9 % (ref 4–15)
NEUTROPHILS # BLD AUTO: 2.9 K/UL (ref 1.8–7.7)
NEUTROPHILS NFR BLD: 52.9 % (ref 38–73)
NRBC BLD-RTO: 0 /100 WBC
PLATELET # BLD AUTO: 127 K/UL (ref 150–350)
PMV BLD AUTO: 9.4 FL (ref 9.2–12.9)
POTASSIUM SERPL-SCNC: 4.2 MMOL/L (ref 3.5–5.1)
PROT SERPL-MCNC: 7.3 G/DL (ref 6–8.4)
RBC # BLD AUTO: 4.25 M/UL (ref 4.6–6.2)
SARS-COV-2 RDRP RESP QL NAA+PROBE: NEGATIVE
SODIUM SERPL-SCNC: 137 MMOL/L (ref 136–145)
TROPONIN I SERPL DL<=0.01 NG/ML-MCNC: <0.006 NG/ML (ref 0–0.03)
WBC # BLD AUTO: 5.42 K/UL (ref 3.9–12.7)

## 2020-09-16 PROCEDURE — 85025 COMPLETE CBC W/AUTO DIFF WBC: CPT

## 2020-09-16 PROCEDURE — 93005 ELECTROCARDIOGRAM TRACING: CPT | Mod: 59

## 2020-09-16 PROCEDURE — 83735 ASSAY OF MAGNESIUM: CPT

## 2020-09-16 PROCEDURE — 99220 PR INITIAL OBSERVATION CARE,LEVL III: CPT | Mod: ,,, | Performed by: HOSPITALIST

## 2020-09-16 PROCEDURE — 93010 ELECTROCARDIOGRAM REPORT: CPT | Mod: ,,, | Performed by: INTERNAL MEDICINE

## 2020-09-16 PROCEDURE — G0378 HOSPITAL OBSERVATION PER HR: HCPCS

## 2020-09-16 PROCEDURE — 25000003 PHARM REV CODE 250: Performed by: HOSPITALIST

## 2020-09-16 PROCEDURE — 99285 PR EMERGENCY DEPT VISIT,LEVEL V: ICD-10-PCS | Mod: ,,, | Performed by: EMERGENCY MEDICINE

## 2020-09-16 PROCEDURE — 93010 EKG 12-LEAD: ICD-10-PCS | Mod: ,,, | Performed by: INTERNAL MEDICINE

## 2020-09-16 PROCEDURE — 99220 PR INITIAL OBSERVATION CARE,LEVL III: ICD-10-PCS | Mod: ,,, | Performed by: HOSPITALIST

## 2020-09-16 PROCEDURE — 80053 COMPREHEN METABOLIC PANEL: CPT

## 2020-09-16 PROCEDURE — 84484 ASSAY OF TROPONIN QUANT: CPT

## 2020-09-16 PROCEDURE — U0002 COVID-19 LAB TEST NON-CDC: HCPCS

## 2020-09-16 PROCEDURE — 99285 EMERGENCY DEPT VISIT HI MDM: CPT | Mod: 25

## 2020-09-16 PROCEDURE — 99285 EMERGENCY DEPT VISIT HI MDM: CPT | Mod: ,,, | Performed by: EMERGENCY MEDICINE

## 2020-09-16 RX ORDER — IBUPROFEN 200 MG
16 TABLET ORAL
Status: DISCONTINUED | OUTPATIENT
Start: 2020-09-16 | End: 2020-09-18 | Stop reason: HOSPADM

## 2020-09-16 RX ORDER — ACETAMINOPHEN 325 MG/1
650 TABLET ORAL EVERY 4 HOURS PRN
Status: DISCONTINUED | OUTPATIENT
Start: 2020-09-16 | End: 2020-09-18 | Stop reason: HOSPADM

## 2020-09-16 RX ORDER — SODIUM CHLORIDE 0.9 % (FLUSH) 0.9 %
10 SYRINGE (ML) INJECTION
Status: DISCONTINUED | OUTPATIENT
Start: 2020-09-16 | End: 2020-09-18 | Stop reason: HOSPADM

## 2020-09-16 RX ORDER — ONDANSETRON 2 MG/ML
4 INJECTION INTRAMUSCULAR; INTRAVENOUS EVERY 8 HOURS PRN
Status: DISCONTINUED | OUTPATIENT
Start: 2020-09-16 | End: 2020-09-18 | Stop reason: HOSPADM

## 2020-09-16 RX ORDER — METOPROLOL SUCCINATE 50 MG/1
50 TABLET, EXTENDED RELEASE ORAL 2 TIMES DAILY
Status: DISCONTINUED | OUTPATIENT
Start: 2020-09-16 | End: 2020-09-16

## 2020-09-16 RX ORDER — TALC
6 POWDER (GRAM) TOPICAL NIGHTLY PRN
Status: DISCONTINUED | OUTPATIENT
Start: 2020-09-16 | End: 2020-09-18 | Stop reason: HOSPADM

## 2020-09-16 RX ORDER — VALSARTAN 160 MG/1
160 TABLET ORAL DAILY
Status: DISCONTINUED | OUTPATIENT
Start: 2020-09-17 | End: 2020-09-17

## 2020-09-16 RX ORDER — PROCHLORPERAZINE EDISYLATE 5 MG/ML
5 INJECTION INTRAMUSCULAR; INTRAVENOUS EVERY 6 HOURS PRN
Status: DISCONTINUED | OUTPATIENT
Start: 2020-09-16 | End: 2020-09-18 | Stop reason: HOSPADM

## 2020-09-16 RX ORDER — IPRATROPIUM BROMIDE AND ALBUTEROL SULFATE 2.5; .5 MG/3ML; MG/3ML
3 SOLUTION RESPIRATORY (INHALATION) EVERY 6 HOURS PRN
Status: DISCONTINUED | OUTPATIENT
Start: 2020-09-16 | End: 2020-09-18 | Stop reason: HOSPADM

## 2020-09-16 RX ORDER — IBUPROFEN 200 MG
24 TABLET ORAL
Status: DISCONTINUED | OUTPATIENT
Start: 2020-09-16 | End: 2020-09-18 | Stop reason: HOSPADM

## 2020-09-16 RX ADMIN — APIXABAN 5 MG: 5 TABLET, FILM COATED ORAL at 10:09

## 2020-09-16 NOTE — TELEPHONE ENCOUNTER
"Pt's wife contacted the [facility] on this afternoon with the pt being able to be heard in the back ground. (the wife had the phone on speaker) The wife appeared very anxious and began rambling about pt's HR is "all over the place and I am afraid for him to go to sleep". Per the wife, pt's digital BP monitor is showing the his HR is in the 30's and 40's and then the 70's.  Pt was seen in the ED x's 2 and was seen in the clinic on 9/14/20 for same complaint.  The wife then began saying, "he is going to have a heart attack, have heart failure or die in his sleep or something; two doctors told him to stop the Metoprolol but Dr. Monteiro's NP told him to restart it and I just don't know what to do; I want to call the ambulance but he won't let me".  Once again expressed to the wife that the pt's pacemaker was checked in the ED on 9/9/20 with a Cardiology eval and again in the clinic on 9/14/20 and the pacemaker is functioning properly, all lead measurements are WNL. Informed the wife that the pt does have a hx of AF and has PVCs for which both of these would lead to an abnormal HR reading with a digital monitor due to digital monitors are not able to accurately measure the HR on pt's with any type of abnormal rhythm and/or PVCs. The wife then stated that their D-I-L is a nurse and she thinks he should go to the hospital because something is wrong with his heart and/or his pacemaker. Again expressed to the wife that one both occassions that the pt's pacemaker was checked all was WNL.  The wife then was able to calm down and verbalized understanding to all of the above.   "

## 2020-09-16 NOTE — HPI
Mr. Doll is an 78yo man, Cardiology consulted for symptomatic bradycardia, this is a patient of Dr. Monteiro with a past medical history of HLD, complete heart block with placement of dual chamber PPM (medtronic), CAD, atrial fibrillation, atrial flutter atypical with Aymil Vasc of 4 on Eliquis, nonischemic cardiomyopathy (nonobstructive CAD) and HTN.  This is his 3rd ER visit in the last month.  Patient reports having off and on lightheadedness, dizziness, presyncopal symptoms any time he would check his pulse during this episode would notice significant bradycardia heart rate 30s to 40s.  He was seen at [facility] on 09/06 297 with similar symptoms, his Medtronic device was interrogated by industry rep did not identify any pacemaker concerns hence he was discharged.  On 914-20 he was seen by EP midlevel Carolina Looney, did not noted to have any pacemaker issues and recommended to resume metoprolol 50 mg b.i.d. with Holter monitor in 3 weeks to evaluate PVC burden.  Red to take his metoprolol yesterday since then he has noticed again symptomatic bradycardia with heart rates ranging from 30 is in 40s and presyncopal symptoms.  He states these episodes last approximately 5 minutes period and now presenting to the ER concerning for pacemaker malfunction.    Medtronic dual chamber pacemaker interrogated;  Mode DDD, HR 60 130   AS-VS 0.7%, AS- 19.1%, AP-VS 1%, AP- 79.1%  PVC singles 25,229, PVC runs 33  Estimate battery time 3 years,   Voltage 2.76V   Curretn 14.69   Impedance 1,431ohms   Threshold 0.4  Sense >0.5

## 2020-09-16 NOTE — FIRST PROVIDER EVALUATION
Emergency Department TeleTriage Encounter Note      CHIEF COMPLAINT    Chief Complaint   Patient presents with    Irregular Heart Beat     Pt states he was sent for evaluation of irregular heartbeat.         VITAL SIGNS   Initial Vitals [09/16/20 1529]   BP Pulse Resp Temp SpO2   (!) 154/72 67 16 97.5 °F (36.4 °C) 97 %      MAP       --            ALLERGIES    Review of patient's allergies indicates:   Allergen Reactions    Pradaxa [dabigatran etexilate] Itching and Rash    Ace inhibitors      Other reaction(s): cough    Chocolate flavor      Other reaction(s): Rash       PROVIDER TRIAGE NOTE  This is a teletriage evaluation of a 79 y.o. male presenting to the ED with c/o low BP and heart rate this morning. Pt spoke with cardiology today - reported HR 30's to 40's. Reported dizziness when checking vitals. Initial orders will be placed and care will be transferred to an alternate provider when patient is roomed for a full evaluation. Any additional orders and the final disposition will be determined by that provider.         ORDERS  Labs Reviewed   CBC W/ AUTO DIFFERENTIAL   COMPREHENSIVE METABOLIC PANEL   TROPONIN I       ED Orders (720h ago, onward)    Start Ordered     Status Ordering Provider    09/16/20 1603 09/16/20 1602  Vital signs  Every 15 min      Ordered NATIVIDAD LITTLE    09/16/20 1603 09/16/20 1602  Cardiac Monitoring - Adult  Continuous     Comments: Notify Physician If:    Ordered NATIVIDAD LITTLE    09/16/20 1603 09/16/20 1602  Pulse Oximetry Continuous  Continuous      Ordered NATIVIDAD LITTLE    09/16/20 1603 09/16/20 1602  Diet NPO  Diet effective now      Ordered NATIVIDAD LITTLE    09/16/20 1603 09/16/20 1602  Saline lock IV  Once      Ordered NATIVIDAD LITTLE    09/16/20 1603 09/16/20 1602  CBC auto differential  STAT  Collect    Ordered NATIVIDAD LITTLE    09/16/20 1603 09/16/20 1602  Comprehensive metabolic panel  STAT  Collect    Ordered NATIVIDAD LITTLE    09/16/20 1603 09/16/20 1602   Troponin I #1  STAT  Collect    Ordered NATIVIDAD LITTLE    09/16/20 1531 09/16/20 1531  EKG 12-lead  Once  Completed    Final result DEVIN FAITH            Virtual Visit Note: The provider triage portion of this emergency department evaluation and documentation was performed via [employer], a HIPAA-compliant telemedicine application, in concert with a tele-presenter in the room. A face to face patient evaluation with one of my colleagues will occur once the patient is placed in an emergency department room.      DISCLAIMER: This note was prepared with [employer] voice recognition transcription software. Garbled syntax, mangled pronouns, and other bizarre constructions may be attributed to that software system.

## 2020-09-16 NOTE — ED TRIAGE NOTES
" Pt reports to ED today w/ complaints of palpitations and bradycardia noted at home. Pt reports pulse 37 at home. Pt reports headache and " not feeling good" when pulse was 37. Pt reports pacemaker in place. Pt denies headache, chest pain, SOB at this time.   "

## 2020-09-16 NOTE — TELEPHONE ENCOUNTER
lvm for patient confirmed the prescribed metoprolol succinate 50mg twice per day dose is correct and what was intended by cardiology nurse practioner. He should continue with his dosage

## 2020-09-16 NOTE — ED PROVIDER NOTES
Encounter Date: 9/16/2020       History     Chief Complaint   Patient presents with    Irregular Heart Beat     Pt states he was sent for evaluation of irregular heartbeat.       HPI   Mr. Doll is a 78 yo M with active medical problems of afib (currently on Eliquis), Cardiomyopathy, HFpEF (EF 50-55%), complete AV block s/p pacemaker presents for symptomatic bradycardia.  Per patient, he woke up yesterday feeling lightheaded and dizzy with low heart rate.  This continued off throughout the day yesterday and last night he reports his heart rate going in the 40s at home with dizziness and lightheadedness.  He also reports right-sided squeezing pressure to right chest and arm last night and today.  He is concerned as he was initially told to stop taking his metoprolol, he recently restarted his metoprolol as instructed by the nurse practitioner for his cardiology team.  He continues to have symptomatic bradycardia associated with lightheadedness, dizziness, nausea without vomiting.  He also reports chest pressure rated at 3/10 that is nonexertional.  He currently is without chest pain, without nausea, without vomiting, no fevers, chills, back pain, arm pain or jaw pain.  Onset of his symptoms are gradual, associated with chest pain and lightheadedness, with no other aggravating or alleviating factors.  These occur intermittently.  Review of patient's allergies indicates:   Allergen Reactions    Pradaxa [dabigatran etexilate] Itching and Rash    Ace inhibitors      Other reaction(s): cough    Chocolate flavor      Other reaction(s): Rash     Past Medical History:   Diagnosis Date    *Atrial fibrillation     Anticoagulant long-term use     Arthritis     Atrial fibrillation     Cardiomyopathy     CHF (congestive heart failure)     Coronary artery disease     Heart block     Hyperlipidemia     Hypertension      Past Surgical History:   Procedure Laterality Date    CHOLECYSTECTOMY      HERNIA REPAIR       INSERT / REPLACE / REMOVE PACEMAKER       Family History   Problem Relation Age of Onset    Cancer Mother         breast    Cancer Sister         breast    Mental illness Son         depression     Social History     Tobacco Use    Smoking status: Never Smoker    Smokeless tobacco: Never Used   Substance Use Topics    Alcohol use: Yes     Alcohol/week: 3.0 standard drinks     Types: 3 Glasses of wine per week     Comment: daily    Drug use: No     Review of Systems   Constitutional: Positive for fatigue. Negative for appetite change, chills and fever.   HENT: Negative for congestion, drooling and sore throat.    Eyes: Negative for photophobia and visual disturbance.   Respiratory: Positive for chest tightness. Negative for choking and shortness of breath.    Cardiovascular: Positive for chest pain and palpitations. Negative for leg swelling.   Gastrointestinal: Positive for nausea. Negative for abdominal pain, constipation and vomiting.   Endocrine: Negative for polyphagia and polyuria.   Genitourinary: Negative for dysuria and frequency.   Musculoskeletal: Negative for back pain, joint swelling, myalgias and neck stiffness.   Skin: Negative for color change and wound.   Neurological: Negative for tremors, syncope, speech difficulty, light-headedness and numbness.   Psychiatric/Behavioral: Negative for confusion. The patient is not nervous/anxious and is not hyperactive.        Physical Exam     Initial Vitals [09/16/20 1529]   BP Pulse Resp Temp SpO2   (!) 154/72 67 16 97.5 °F (36.4 °C) 97 %      MAP       --         Physical Exam    Nursing note and vitals reviewed.    Gen/Constitutional: Interactive. No acute distress  Head: Normocephalic, Atraumatic  Neck: supple, no masses or LAD, no JVD  Eyes: PERRLA, conjunctiva clear  Ears, Nose and Throat: No rhinorrhea or stridor.  Cardiac:  Regular rate, Reg Rhythm, No murmur, rubs or gallops, 2+ distal pulses  Pulmonary: CTA Bilat, no wheezes, rhonchi,  rales.  GI: Abdomen soft, non-tender, non-distended; no rebound or guarding  : No CVA tenderness.  Musculoskeletal: Extremities warm, well perfused, no erythema, no edema  Skin: No rashes  Neuro: Alert and Oriented x 3; No focal motor or sensory deficits.    Psych: Normal affect      ED Course   Procedures  Labs Reviewed   CBC W/ AUTO DIFFERENTIAL - Abnormal; Notable for the following components:       Result Value    RBC 4.25 (*)     Hemoglobin 13.5 (*)     Mean Corpuscular Hemoglobin 31.8 (*)     Platelets 127 (*)     All other components within normal limits   COMPREHENSIVE METABOLIC PANEL - Abnormal; Notable for the following components:    Alkaline Phosphatase 54 (*)     All other components within normal limits   TROPONIN I   MAGNESIUM   MAGNESIUM    Narrative:     ADD ON MG ORDER #[id number] PER MARCELL D.CEE  09/16/2020  18:45    SARS-COV-2 RNA AMPLIFICATION, QUAL     EKG Readings: (Independently Interpreted)   Initial Reading: No STEMI. Previous EKG: Compared with most recent EKG Heart Rate: 63. Ectopy: No Ectopy.   AV dual paced rhythm, heart rate of 63     ECG Results          EKG 12-lead (In process)  Result time 09/17/20 07:45:06    In process by Interface, Lab In [facility] (09/17/20 07:45:06)                 Narrative:    Test Reason : R07.9    Vent. Rate : 065 BPM     Atrial Rate : 065 BPM     P-R Int : 126 ms          QRS Dur : 192 ms      QT Int : 462 ms       P-R-T Axes : 097 -77 098 degrees     QTc Int : 480 ms    Atrial-sensed ventricular-paced rhythm  Abnormal ECG  When compared with ECG of 16-SEP-2020 15:36,  Vent. rate has increased BY   2 BPM    Referred By: [email or web address]   SELF           Confirmed By:                              EKG 12-lead (Final result)  Result time 09/16/20 15:56:29    Final result by Interface, Lab In [facility] (09/16/20 15:56:29)                 Narrative:    Test Reason : I49.9,    Vent. Rate : 063 BPM     Atrial Rate : 063 BPM     P-R Int : 154 ms          QRS Dur : 218 ms       QT Int : 496 ms       P-R-T Axes : 093 -74 094 degrees     QTc Int : 507 ms    AV dual-paced rhythm  Abnormal ECG  When compared with ECG of 14-SEP-2020 08:19,  Premature ventricular complexes are no longer Present  Vent. rate has decreased BY  11 BPM  Confirmed by Dylan TORO, Nik STANLEY ([id number]) on 9/16/2020 3:56:18 PM    Referred By: [email or web address]   SELF           Confirmed By:Nik Richardson MD                            Imaging Results    None          Medical Decision Making:   History:   Old Medical Records: I decided to obtain old medical records.  Old Records Summarized: records from clinic visits and records from previous admission(s).  Initial Assessment:   Mr. Doll is a 80 yo M with active medical problems of afib (currently on Eliquis), Cardiomyopathy, HFpEF (EF 50-55%), complete AV block s/p pacemaker presents for symptomatic bradycardia.  Differential Diagnosis:   Cardiac arrhythmia, pacemaker failure, ACS, dehydration, electrolyte abnormality  Independently Interpreted Test(s):   I have ordered and independently interpreted EKG Reading(s) - see prior notes  Clinical Tests:   Lab Tests: Ordered and Reviewed  Medical Tests: Ordered and Reviewed  Other:   I have discussed this case with another health care provider.       <> Summary of the Discussion: Cardiology    Emergent evaluation of patient presenting with symptomatic bradycardia, palpitations and chest pain.  He is currently afebrile, vital signs are stable.  Physical exam findings remarkable for clear lung sounds, cardiac exam unremarkable, no focal neurologic deficits, no lower extremity edema, 2+ equal pulses.  ECG obtained with no signs of ischemia or STEMI on my read.  IV line placed, labs were drawn, labs unremarkable without troponin elevation.  Discussed case with Cardiology who evaluated the patient at bedside.  Pacemaker was interrogated for symptomatic bradycardia, with no acute changes.  Suspect likely PVCs causing changes as patient is  currently on a beta-blocker.  Per cardiology recommendations, electrophysiology consult in the a.m., observation status for the night on telemetry.  Patient remained hemodynamically stable throughout his ED evaluation.  Patient agreeable to observation plan.    Complexity:  High risk-level 5                           Clinical Impression:       ICD-10-CM ICD-9-CM   1. PVC's (premature ventricular contractions)  I49.3 427.69   2. Irregular cardiac rhythm  I49.9 427.9   3. Bradycardia  R00.1 427.89                      Disposition:   Disposition: Placed in Observation  Condition: Stable     ED Disposition Condition    Observation              Eddie Garza DO, FAAEM  Emergency Staff Physician   Dept of Emergency Medicine   [facility]  Spectralink: [id number]                   Eddie Garza DO  09/17/20 1131

## 2020-09-17 LAB
ANION GAP SERPL CALC-SCNC: 6 MMOL/L (ref 8–16)
BASOPHILS # BLD AUTO: 0.05 K/UL (ref 0–0.2)
BASOPHILS NFR BLD: 0.9 % (ref 0–1.9)
BUN SERPL-MCNC: 18 MG/DL (ref 8–23)
CALCIUM SERPL-MCNC: 9.5 MG/DL (ref 8.7–10.5)
CHLORIDE SERPL-SCNC: 106 MMOL/L (ref 95–110)
CO2 SERPL-SCNC: 26 MMOL/L (ref 23–29)
CREAT SERPL-MCNC: 0.8 MG/DL (ref 0.5–1.4)
DIFFERENTIAL METHOD: ABNORMAL
EOSINOPHIL # BLD AUTO: 0.4 K/UL (ref 0–0.5)
EOSINOPHIL NFR BLD: 7.8 % (ref 0–8)
ERYTHROCYTE [DISTWIDTH] IN BLOOD BY AUTOMATED COUNT: 14.2 % (ref 11.5–14.5)
EST. GFR  (AFRICAN AMERICAN): >60 ML/MIN/1.73 M^2
EST. GFR  (NON AFRICAN AMERICAN): >60 ML/MIN/1.73 M^2
GLUCOSE SERPL-MCNC: 100 MG/DL (ref 70–110)
HCT VFR BLD AUTO: 40.6 % (ref 40–54)
HGB BLD-MCNC: 13.7 G/DL (ref 14–18)
IMM GRANULOCYTES # BLD AUTO: 0.04 K/UL (ref 0–0.04)
IMM GRANULOCYTES NFR BLD AUTO: 0.7 % (ref 0–0.5)
LYMPHOCYTES # BLD AUTO: 1.9 K/UL (ref 1–4.8)
LYMPHOCYTES NFR BLD: 33.5 % (ref 18–48)
MAGNESIUM SERPL-MCNC: 2.1 MG/DL (ref 1.6–2.6)
MCH RBC QN AUTO: 32 PG (ref 27–31)
MCHC RBC AUTO-ENTMCNC: 33.7 G/DL (ref 32–36)
MCV RBC AUTO: 95 FL (ref 82–98)
MONOCYTES # BLD AUTO: 0.5 K/UL (ref 0.3–1)
MONOCYTES NFR BLD: 9.8 % (ref 4–15)
NEUTROPHILS # BLD AUTO: 2.6 K/UL (ref 1.8–7.7)
NEUTROPHILS NFR BLD: 47.3 % (ref 38–73)
NRBC BLD-RTO: 0 /100 WBC
PLATELET # BLD AUTO: 102 K/UL (ref 150–350)
PMV BLD AUTO: 9.4 FL (ref 9.2–12.9)
POTASSIUM SERPL-SCNC: 4.4 MMOL/L (ref 3.5–5.1)
RBC # BLD AUTO: 4.28 M/UL (ref 4.6–6.2)
SODIUM SERPL-SCNC: 138 MMOL/L (ref 136–145)
WBC # BLD AUTO: 5.53 K/UL (ref 3.9–12.7)

## 2020-09-17 PROCEDURE — 99226 PR SUBSEQUENT OBSERVATION CARE,LEVEL III: CPT | Mod: ,,, | Performed by: PHYSICIAN ASSISTANT

## 2020-09-17 PROCEDURE — 97161 PT EVAL LOW COMPLEX 20 MIN: CPT

## 2020-09-17 PROCEDURE — 85025 COMPLETE CBC W/AUTO DIFF WBC: CPT

## 2020-09-17 PROCEDURE — 36415 COLL VENOUS BLD VENIPUNCTURE: CPT

## 2020-09-17 PROCEDURE — G0378 HOSPITAL OBSERVATION PER HR: HCPCS

## 2020-09-17 PROCEDURE — 99214 PR OFFICE/OUTPT VISIT, EST, LEVL IV, 30-39 MIN: ICD-10-PCS | Mod: ,,, | Performed by: INTERNAL MEDICINE

## 2020-09-17 PROCEDURE — 25000003 PHARM REV CODE 250: Performed by: HOSPITALIST

## 2020-09-17 PROCEDURE — 99226 PR SUBSEQUENT OBSERVATION CARE,LEVEL III: ICD-10-PCS | Mod: ,,, | Performed by: PHYSICIAN ASSISTANT

## 2020-09-17 PROCEDURE — 25000003 PHARM REV CODE 250: Performed by: PHYSICIAN ASSISTANT

## 2020-09-17 PROCEDURE — 83735 ASSAY OF MAGNESIUM: CPT

## 2020-09-17 PROCEDURE — 97530 THERAPEUTIC ACTIVITIES: CPT

## 2020-09-17 PROCEDURE — 80048 BASIC METABOLIC PNL TOTAL CA: CPT

## 2020-09-17 PROCEDURE — 97165 OT EVAL LOW COMPLEX 30 MIN: CPT

## 2020-09-17 PROCEDURE — 97535 SELF CARE MNGMENT TRAINING: CPT

## 2020-09-17 PROCEDURE — 99214 OFFICE O/P EST MOD 30 MIN: CPT | Mod: ,,, | Performed by: INTERNAL MEDICINE

## 2020-09-17 RX ORDER — VALSARTAN 80 MG/1
80 TABLET ORAL DAILY
Status: DISCONTINUED | OUTPATIENT
Start: 2020-09-18 | End: 2020-09-18 | Stop reason: HOSPADM

## 2020-09-17 RX ORDER — METOPROLOL SUCCINATE 100 MG/1
100 TABLET, EXTENDED RELEASE ORAL 2 TIMES DAILY
Status: DISCONTINUED | OUTPATIENT
Start: 2020-09-17 | End: 2020-09-18

## 2020-09-17 RX ADMIN — APIXABAN 5 MG: 5 TABLET, FILM COATED ORAL at 08:09

## 2020-09-17 RX ADMIN — VALSARTAN 160 MG: 160 TABLET, FILM COATED ORAL at 08:09

## 2020-09-17 RX ADMIN — METOPROLOL SUCCINATE 100 MG: 100 TABLET, EXTENDED RELEASE ORAL at 08:09

## 2020-09-17 NOTE — NURSING
Patient admitted to CSU from ED.  Patient arrives to unit via stretcher.  Ambulated to bed without assistance.  AAOX4, VSS , oriented to surroundings. Bed in lowest position. Call system within reach.  Side rails up x 2.  Telemetry notified patient is in room.

## 2020-09-17 NOTE — SUBJECTIVE & OBJECTIVE
Interval History: No acute events overnight. Pt seen at bedside resting in NAD with wife present. Appreciate EP recs-- starting mtp xl 100mg PO bid and decreasing valsartan to 80mg daily. Monitor BP and sxs overnight with medication adjustments   Holter monitor ordered for dc    Review of Systems   Constitutional: Negative for fever.   Respiratory: Negative for chest tightness and shortness of breath.    Cardiovascular: Positive for chest pain and palpitations.   Gastrointestinal: Negative for abdominal pain, nausea and vomiting.   Genitourinary: Negative for difficulty urinating and dysuria.   Musculoskeletal: Negative for arthralgias and back pain.   Skin: Negative for color change.   Neurological: Negative for syncope.   Psychiatric/Behavioral: Negative for agitation and confusion.     Objective:     Vital Signs (Most Recent):  Temp: 98.3 °F (36.8 °C) (09/17/20 0812)  Pulse: 71 (09/17/20 1107)  Resp: 18 (09/17/20 0812)  BP: (!) 152/70 (09/17/20 0812)  SpO2: 96 % (09/17/20 1107) Vital Signs (24h Range):  Temp:  [97.3 °F (36.3 °C)-98.3 °F (36.8 °C)] 98.3 °F (36.8 °C)  Pulse:  [59-80] 71  Resp:  [16-20] 18  SpO2:  [96 %-99 %] 96 %  BP: (116-154)/(61-82) 152/70     Weight: 112.9 kg (248 lb 14.4 oz)  Body mass index is 33.76 kg/m².    Intake/Output Summary (Last 24 hours) at 9/17/2020 1502  Last data filed at 9/17/2020 0600  Gross per 24 hour   Intake 240 ml   Output 1000 ml   Net -760 ml      Physical Exam  Constitutional:       General: He is not in acute distress.     Appearance: Normal appearance. He is not ill-appearing.   HENT:      Head: Normocephalic and atraumatic.   Cardiovascular:      Rate and Rhythm: Normal rate and regular rhythm.      Pulses: Normal pulses.   Pulmonary:      Effort: Pulmonary effort is normal. No respiratory distress.   Abdominal:      General: Abdomen is flat.      Palpations: Abdomen is soft.   Musculoskeletal:         General: No swelling.   Skin:     General: Skin is warm and dry.    Neurological:      General: No focal deficit present.      Mental Status: He is alert and oriented to person, place, and time.   Psychiatric:         Mood and Affect: Mood normal.         Behavior: Behavior normal.         Significant Labs: All pertinent labs within the past 24 hours have been reviewed.    Significant Imaging: I have reviewed all pertinent imaging results/findings within the past 24 hours.

## 2020-09-17 NOTE — ASSESSMENT & PLAN NOTE
Current long term use of AC  -On EKG today he is A-sensed V-Paced. Holding B-blocker as above  Continue eliquis for AC

## 2020-09-17 NOTE — HPI
Mr. Aldo Doll is an 78 yo M with a pmhx s/f CHB s/p DC PPM (Medtronic) - followed by Dr. Monteiro as outpt, AF, Afl (eliquis), NICM, HTN, HLD who presents for symptoms of pre-syncope and light headedness. Pt reports feelings of weakness as well and measured his HR on his home BP cuff to be in upper 30 and low 40s. This is his 3rd ER visit in the last month.  Patient reports having off and on lightheadedness, dizziness, presyncopal symptoms any time he would check his pulse during this episode would notice his HR to be 30s to 40s.  He was seen at [facility] on 09/06 297 with similar symptoms, his Medtronic device was interrogated by industry rep did not identify any pacemaker concerns hence he was discharged.  On 9/14/20 he was seen by EP midlevel Carolina Looney. She saw increased PVC, so BB was restarted (Metop Succ 50 mg bid) and pt instructed to get Holter monitor (not received till this point) to assess for PVC burden. Pt stating symptoms recurred after restarting BB.       Medtronic dual chamber pacemaker interrogated;  Mode DDD, HR 60 130   AS-VS 0.7%, AS- 19.1%, AP-VS 1%, AP- 79.1%  PVC singles 25,229, PVC runs 33  Estimate battery time 3 years,   Voltage 2.76V   Curretn 14.69   Impedance 1,431ohms   Atrial threshold 1.25  Ventricular threshold 1.75  ASense 2-2.8    Pt Lower limit changed to 70 bpm as pvc burden was noticed to significantly drop after HR was increased.

## 2020-09-17 NOTE — ASSESSMENT & PLAN NOTE
-Holding toprol-XL as above, continue Valsartan 160mg po qday  Per EP:  Restart toprol xl 100mg PO BID, decrease valsartan to 80mg daily  - can add nifedipine or norvasc for tighter bp control

## 2020-09-17 NOTE — SUBJECTIVE & OBJECTIVE
Past Medical History:   Diagnosis Date    *Atrial fibrillation     Anticoagulant long-term use     Arthritis     Atrial fibrillation     Cardiomyopathy     CHF (congestive heart failure)     Coronary artery disease     Heart block     Hyperlipidemia     Hypertension        Past Surgical History:   Procedure Laterality Date    CHOLECYSTECTOMY      HERNIA REPAIR      INSERT / REPLACE / REMOVE PACEMAKER         Review of patient's allergies indicates:   Allergen Reactions    Pradaxa [dabigatran etexilate] Itching and Rash    Ace inhibitors      Other reaction(s): cough    Chocolate flavor      Other reaction(s): Rash       No current facility-administered medications on file prior to encounter.      Current Outpatient Medications on File Prior to Encounter   Medication Sig    ELIQUIS 5 mg Tab Take 1 tablet by mouth twice daily    fish oil-omega-3 fatty acids 300-1,000 mg capsule Take 2 capsules by mouth once daily.     metoprolol succinate (TOPROL-XL) 50 MG 24 hr tablet Take 1 tablet (50 mg total) by mouth 2 (two) times daily.    valsartan (DIOVAN) 160 MG tablet Take 1 tablet (160 mg total) by mouth once daily.     Family History     Problem Relation (Age of Onset)    Cancer Mother, Sister    Mental illness Son        Tobacco Use    Smoking status: Never Smoker    Smokeless tobacco: Never Used   Substance and Sexual Activity    Alcohol use: Yes     Alcohol/week: 3.0 standard drinks     Types: 3 Glasses of wine per week     Comment: daily    Drug use: No    Sexual activity: Yes     Partners: Female     Review of Systems   Constitution: Positive for malaise/fatigue. Negative for chills, decreased appetite, diaphoresis, fever, weight gain and weight loss.   Eyes: Negative for blurred vision.   Cardiovascular: Positive for near-syncope and syncope. Negative for chest pain, dyspnea on exertion, irregular heartbeat, leg swelling, orthopnea and palpitations.   Respiratory: Negative for cough,  shortness of breath, snoring and wheezing.    Gastrointestinal: Negative for abdominal pain, nausea and vomiting.   Genitourinary: Negative for bladder incontinence and urgency.     Objective:     Vital Signs (Most Recent):  Temp: 97.5 °F (36.4 °C) (09/16/20 1529)  Pulse: 60 (09/16/20 1815)  Resp: 18 (09/16/20 1815)  BP: 128/66 (09/16/20 1815)  SpO2: 99 % (09/16/20 1815) Vital Signs (24h Range):  Temp:  [97.5 °F (36.4 °C)] 97.5 °F (36.4 °C)  Pulse:  [60-80] 60  Resp:  [16-20] 18  SpO2:  [97 %-99 %] 99 %  BP: (128-154)/(66-72) 128/66        There is no height or weight on file to calculate BMI.    SpO2: 99 %  O2 Device (Oxygen Therapy): room air    No intake or output data in the 24 hours ending 09/16/20 1922    Lines/Drains/Airways     Peripheral Intravenous Line                 Peripheral IV - Single Lumen 09/16/20 20 G Left Hand less than 1 day                Physical Exam   Constitutional: He is oriented to person, place, and time. He appears well-developed and well-nourished. No distress.   Neck: No JVD present.   Cardiovascular: Normal rate, regular rhythm, normal heart sounds and intact distal pulses. Exam reveals no gallop and no friction rub.   No murmur heard.  Pulmonary/Chest: Effort normal and breath sounds normal. No respiratory distress. He has no wheezes. He has no rales. He exhibits no tenderness.   Abdominal: Soft. Bowel sounds are normal. He exhibits no distension and no mass. There is no abdominal tenderness. There is no rebound and no guarding.   Musculoskeletal: Normal range of motion.         General: No edema.   Neurological: He is alert and oriented to person, place, and time.   Skin: Skin is warm and dry. He is not diaphoretic. No erythema.   Nursing note and vitals reviewed.      Significant Labs:   BMP:   Recent Labs   Lab 09/16/20  1643         K 4.2      CO2 23   BUN 22   CREATININE 1.0   CALCIUM 9.7   MG 2.0   , CMP   Recent Labs   Lab 09/16/20  1643      K 4.2       CO2 23      BUN 22   CREATININE 1.0   CALCIUM 9.7   PROT 7.3   ALBUMIN 4.3   BILITOT 0.9   ALKPHOS 54*   AST 29   ALT 40   ANIONGAP 10   ESTGFRAFRICA >60.0   EGFRNONAA >60.0   , CBC   Recent Labs   Lab 09/16/20  1643   WBC 5.42   HGB 13.5*   HCT 40.5   *   , INR No results for input(s): INR, PROTIME in the last 48 hours. and Lipid Panel No results for input(s): CHOL, HDL, LDLCALC, TRIG, CHOLHDL in the last 48 hours.    Significant Imaging: Echocardiogram:   Transthoracic echo (TTE) complete (Cupid Only):   Results for orders placed or performed during the hospital encounter of 09/05/20   Echo Color Flow Doppler? Yes   Result Value Ref Range    AV mean gradient 5 mmHg    Ao peak serafin 1.63 m/s    Ao VTI 31.45 cm    IVRT 114.19 msec    IVS 1.30 (A) 0.6 - 1.1 cm    LA size 4.34 cm    Left Atrium Major Axis 4.90 cm    Left Atrium Minor Axis 4.68 cm    LVIDD 5.17 3.5 - 6.0 cm    LVIDS 3.69 2.1 - 4.0 cm    LVOT diameter 2.35 cm    LVOT peak VTI 20.34 cm    PW 1.31 (A) 0.6 - 1.1 cm    MV Peak A Serafin 0.59 m/s    E wave decelartion time 224.73 msec    MV Peak E Serafin 0.85 m/s    PV Peak D Serafin 0.37 m/s    PV Peak S Serafin 0.60 m/s    RA Major Axis 4.52 cm    RA Width 3.90 cm    RVDD 3.86 cm    Sinus 3.31 cm    TAPSE 1.96 cm    TR Max Serafin 2.73 m/s    LA WIDTH 5.03 cm    Ao root annulus 3.68 cm    AORTIC VALVE CUSP SEPERATION 1.77 cm    PV PEAK VELOCITY 0.96 cm/s    MV stenosis pressure 1/2 time 65.17 ms    LV Diastolic Volume 127.81 mL    LV Systolic Volume 57.72 mL    LVOT peak serafin 1.06 m/s    FS 29 %    LA volume 88.83 cm3    LV mass 277.43 g    Left Ventricle Relative Wall Thickness 0.51 cm    AV valve area 2.80 cm2    AV Velocity Ratio 0.65     AV index (prosthetic) 0.65     MV valve area p 1/2 method 3.38 cm2    E/A ratio 1.44     Pulm vein S/D ratio 1.62     LVOT area 4.3 cm2    LVOT stroke volume 88.18 cm3    AV peak gradient 11 mmHg    LV Systolic Volume Index 23.9 mL/m2    LV Diastolic Volume Index  52.97 mL/m2    LA Volume Index 36.8 mL/m2    LV Mass Index 115 g/m2    Triscuspid Valve Regurgitation Peak Gradient 30 mmHg    BSA 2.24 m2    Right Atrial Pressure (from IVC) 3 mmHg    TV rest pulmonary artery pressure 33 mmHg    Narrative    · Low normal left ventricular systolic function. The estimated ejection   fraction is 50%.  · Indeterminate left ventricular diastolic function.  · Septal wall has abnormal motion consistent with right ventricular   pacemaker.  · Moderate left atrial enlargement.  · Normal right ventricular systolic function.  · Mild right atrial enlargement.  · Mild aortic regurgitation.  · Normal central venous pressure (3 mmHg).  · The estimated PA systolic pressure is 33 mmHg.  · Mild concentric left ventricular hypertrophy.

## 2020-09-17 NOTE — CONSULTS
[facility]  Cardiac Electrophysiology  Consult Note    Admission Date: 9/16/2020  Code Status: Full Code   Attending Provider: Hakan Treviño MD  Consulting Provider: Ivania Clemente MD  Principal Problem:AV block, complete    Inpatient consult to Electrophysiology  Consult performed by: Ivania Clemente MD  Consult ordered by: Tremaine Reyes MD        Subjective:     Reason for consult: Concern for bradycardia    HPI:   Mr. Aldo Doll is an 78 yo M with a pmhx s/f CHB s/p DC PPM (Medtronic) - followed by Dr. Monteiro as outpt, AF, Afl (eliquis), NICM, HTN, HLD who presents for symptoms of pre-syncope and light headedness. Pt reports feelings of weakness as well and measured his HR on his home BP cuff to be in upper 30 and low 40s. This is his 3rd ER visit in the last month.  Patient reports having off and on lightheadedness, dizziness, presyncopal symptoms any time he would check his pulse during this episode would notice his HR to be 30s to 40s.  He was seen at [facility] on 09/06 297 with similar symptoms, his Medtronic device was interrogated by industry rep did not identify any pacemaker concerns hence he was discharged.  On 9/14/20 he was seen by EP midlevel Carolina Looney. She saw increased PVC, so BB was restarted (Metop Succ 50 mg bid) and pt instructed to get Holter monitor (not received till this point) to assess for PVC burden. Pt stating symptoms recurred after restarting BB.       Medtronic dual chamber pacemaker interrogated;  Mode DDD, HR 60 130   AS-VS 0.7%, AS- 19.1%, AP-VS 1%, AP- 79.1%  PVC singles 25,229, PVC runs 33  Estimate battery time 3 years,   Voltage 2.76V   Curretn 14.69   Impedance 1,431ohms   Atrial threshold 1.25  Ventricular threshold 1.75  ASense 2-2.8    Pt Lower limit changed to 70 bpm as pvc burden was noticed to significantly drop after HR was increased.    Past Medical History:   Diagnosis Date    *Atrial fibrillation     Anticoagulant long-term  use     Arthritis     Atrial fibrillation     Cardiomyopathy     CHF (congestive heart failure)     Coronary artery disease     Heart block     Hyperlipidemia     Hypertension        Past Surgical History:   Procedure Laterality Date    CHOLECYSTECTOMY      HERNIA REPAIR      INSERT / REPLACE / REMOVE PACEMAKER         Review of patient's allergies indicates:   Allergen Reactions    Pradaxa [dabigatran etexilate] Itching and Rash    Ace inhibitors      Other reaction(s): cough    Chocolate flavor      Other reaction(s): Rash       No current facility-administered medications on file prior to encounter.      Current Outpatient Medications on File Prior to Encounter   Medication Sig    ELIQUIS 5 mg Tab Take 1 tablet by mouth twice daily    fish oil-omega-3 fatty acids 300-1,000 mg capsule Take 2 capsules by mouth once daily.     metoprolol succinate (TOPROL-XL) 50 MG 24 hr tablet Take 1 tablet (50 mg total) by mouth 2 (two) times daily.    valsartan (DIOVAN) 160 MG tablet Take 1 tablet (160 mg total) by mouth once daily.     Family History     Problem Relation (Age of Onset)    Cancer Mother, Sister    Mental illness Son        Tobacco Use    Smoking status: Never Smoker    Smokeless tobacco: Never Used   Substance and Sexual Activity    Alcohol use: Yes     Alcohol/week: 3.0 standard drinks     Types: 3 Glasses of wine per week     Comment: daily    Drug use: No    Sexual activity: Yes     Partners: Female     Review of Systems   Constitution: Positive for malaise/fatigue. Negative for chills, decreased appetite, diaphoresis, fever, weight gain and weight loss.   Eyes: Negative for blurred vision.   Cardiovascular: Positive for near-syncope and syncope. Negative for chest pain, dyspnea on exertion, irregular heartbeat, leg swelling, orthopnea and palpitations.   Respiratory: Negative for cough, shortness of breath, snoring and wheezing.    Gastrointestinal: Negative for abdominal pain, nausea  and vomiting.   Genitourinary: Negative for bladder incontinence and urgency.     Objective:     Vital Signs (Most Recent):  Temp: 98.3 °F (36.8 °C) (09/17/20 0812)  Pulse: 71 (09/17/20 1107)  Resp: 18 (09/17/20 0812)  BP: (!) 152/70 (09/17/20 0812)  SpO2: 96 % (09/17/20 1107) Vital Signs (24h Range):  Temp:  [97.3 °F (36.3 °C)-98.3 °F (36.8 °C)] 98.3 °F (36.8 °C)  Pulse:  [59-80] 71  Resp:  [16-20] 18  SpO2:  [96 %-99 %] 96 %  BP: (116-154)/(61-82) 152/70       Weight: 112.9 kg (248 lb 14.4 oz)  Body mass index is 33.76 kg/m².    SpO2: 96 %  O2 Device (Oxygen Therapy): room air    Physical Exam   Constitutional: He is oriented to person, place, and time. He appears well-developed and well-nourished. No distress.   Neck: No JVD present.   Cardiovascular: Normal rate, regular rhythm, normal heart sounds and intact distal pulses. Exam reveals no gallop and no friction rub.   No murmur heard.  Pulmonary/Chest: Effort normal and breath sounds normal. No respiratory distress. He has no wheezes. He has no rales. He exhibits no tenderness.   Abdominal: Soft. Bowel sounds are normal. He exhibits no distension and no mass. There is no abdominal tenderness. There is no rebound and no guarding.   Musculoskeletal: Normal range of motion.         General: No edema.   Neurological: He is alert and oriented to person, place, and time.   Skin: Skin is warm and dry. He is not diaphoretic. No erythema.   Nursing note and vitals reviewed.      Significant Labs:   CMP:   Recent Labs   Lab 09/16/20  1643 09/17/20  0714    138   K 4.2 4.4    106   CO2 23 26    100   BUN 22 18   CREATININE 1.0 0.8   CALCIUM 9.7 9.5   PROT 7.3  --    ALBUMIN 4.3  --    BILITOT 0.9  --    ALKPHOS 54*  --    AST 29  --    ALT 40  --    ANIONGAP 10 6*   ESTGFRAFRICA >60.0 >60.0   EGFRNONAA >60.0 >60.0    and CBC:   Recent Labs   Lab 09/16/20  1643 09/17/20  0714   WBC 5.42 5.53   HGB 13.5* 13.7*   HCT 40.5 40.6   * 102*        Significant Imaging: Echocardiogram:   2D echo with color flow doppler:   Results for orders placed or performed during the hospital encounter of 03/31/17   2D echo with color flow doppler   Result Value Ref Range    QEF 40 (A) 55 - 65    Diastolic Dysfunction Yes (A)     Aortic Valve Regurgitation TRIVIAL     Est. PA Systolic Pressure 26.04     Tricuspid Valve Regurgitation MILD     Narrative    Date of Procedure: 03/31/2017        TEST DESCRIPTION   Technical Quality: This is a technically challenging study.     General: A catheter is present in the right-sided cardiac chambers.     Aorta: The aortic root is normal in size, measuring 3.1 cm at sinotubular junction and 3.6 cm at Sinuses of Valsalva. The proximal ascending aorta is normal in size, measuring 4.0 cm across.     Left Atrium: The left atrial volume index is mildly enlarged, measuring 41.29 cc/m2.     Left Ventricle: The left ventricle is normal in size, with an end-diastolic diameter of 6.0 cm, and an end-systolic diameter of 4.8 cm. LV wall thickness is normal, with the septum measuring 1.1 cm and the posterior wall measuring 1.0 cm across. Relative   wall thickness was normal at 0.33, and the LV mass index was increased at 140.4 g/m2 consistent with eccentric left ventricular hypertrophy. Global left ventricular systolic function appears mildly to moderately depressed. Visually estimated ejection   fraction is 40-45%. The LV Doppler derived stroke volume equals 117.0 ccs.   The E/e'(lat) is 8.  This along with the following abnormalities (JERRY = 41.29) suggests significant diastolic dysfunction.     Right Atrium: The right atrium is enlarged, measuring 6.1 cm in length and 3.6 cm in width in the apical view.     Right Ventricle: The right ventricle is normal in size measuring 3.8 cm at the base in the apical right ventricle-focused view. Global right ventricular systolic function appears normal. There is abnormal septal motion consistent with  paced rhythm.   Tricuspid annular plane systolic excursion (TAPSE) is 1.8 cm. The estimated PA systolic pressure is 26 mmHg.     Aortic Valve:  Additionally, there is trivial aortic regurgitation. Aortic valve is normal in structure with normal leaflet mobility.     Mitral Valve:  Mitral valve is normal in structure with normal leaflet mobility.     Tricuspid Valve:  There is mild tricuspid regurgitation. Tricuspid valve is normal in structure with normal leaflet mobility.     Pulmonary Valve:  The pulmonic valve is not well seen.     IVC: IVC is normal in size and collapses > 50% with a sniff, suggesting normal right atrial pressure of 3 mmHg.     Intracavitary: There is no evidence of pericardial effusion, intracavity mass, thrombi, or vegetation.         CONCLUSIONS     1 - Eccentric hypertrophy.     2 - Mildly to moderately depressed left ventricular systolic function (EF 40-45%).     3 - Normal right ventricular systolic function .     4 - Left ventricular diastolic dysfunction.     5 - Biatrial enlargement.     6 - Mild tricuspid regurgitation.     7 - The estimated PA systolic pressure is 26 mmHg.             This document has been electronically    SIGNED BY: Nik Richardson MD On: 03/31/2017 09:06    and Transthoracic echo (TTE) complete (Cupid Only):   Results for orders placed or performed during the hospital encounter of 09/05/20   Echo Color Flow Doppler? Yes   Result Value Ref Range    AV mean gradient 5 mmHg    Ao peak shawn 1.63 m/s    Ao VTI 31.45 cm    IVRT 114.19 msec    IVS 1.30 (A) 0.6 - 1.1 cm    LA size 4.34 cm    Left Atrium Major Axis 4.90 cm    Left Atrium Minor Axis 4.68 cm    LVIDD 5.17 3.5 - 6.0 cm    LVIDS 3.69 2.1 - 4.0 cm    LVOT diameter 2.35 cm    LVOT peak VTI 20.34 cm    PW 1.31 (A) 0.6 - 1.1 cm    MV Peak A Shawn 0.59 m/s    E wave decelartion time 224.73 msec    MV Peak E Shawn 0.85 m/s    PV Peak D Shawn 0.37 m/s    PV Peak S Shawn 0.60 m/s    RA Major Axis 4.52 cm    RA Width 3.90 cm     RVDD 3.86 cm    Sinus 3.31 cm    TAPSE 1.96 cm    TR Max Serafin 2.73 m/s    LA WIDTH 5.03 cm    Ao root annulus 3.68 cm    AORTIC VALVE CUSP SEPERATION 1.77 cm    PV PEAK VELOCITY 0.96 cm/s    MV stenosis pressure 1/2 time 65.17 ms    LV Diastolic Volume 127.81 mL    LV Systolic Volume 57.72 mL    LVOT peak serafin 1.06 m/s    FS 29 %    LA volume 88.83 cm3    LV mass 277.43 g    Left Ventricle Relative Wall Thickness 0.51 cm    AV valve area 2.80 cm2    AV Velocity Ratio 0.65     AV index (prosthetic) 0.65     MV valve area p 1/2 method 3.38 cm2    E/A ratio 1.44     Pulm vein S/D ratio 1.62     LVOT area 4.3 cm2    LVOT stroke volume 88.18 cm3    AV peak gradient 11 mmHg    LV Systolic Volume Index 23.9 mL/m2    LV Diastolic Volume Index 52.97 mL/m2    LA Volume Index 36.8 mL/m2    LV Mass Index 115 g/m2    Triscuspid Valve Regurgitation Peak Gradient 30 mmHg    BSA 2.24 m2    Right Atrial Pressure (from IVC) 3 mmHg    TV rest pulmonary artery pressure 33 mmHg    Narrative    · Low normal left ventricular systolic function. The estimated ejection   fraction is 50%.  · Indeterminate left ventricular diastolic function.  · Septal wall has abnormal motion consistent with right ventricular   pacemaker.  · Moderate left atrial enlargement.  · Normal right ventricular systolic function.  · Mild right atrial enlargement.  · Mild aortic regurgitation.  · Normal central venous pressure (3 mmHg).  · The estimated PA systolic pressure is 33 mmHg.  · Mild concentric left ventricular hypertrophy.                  Assessment and Plan:     PVC's (premature ventricular contractions)  Pt noted to have significant PVC burden, which was the likely cause of his symptoms.  BP Cuff likely incorrectly assess HR to be lower due to the PVCs.  Device interrogated multiple times including EP staff, and it is functioning properly.   Lower limit HR increased to 70 bpm, which significantly reduced PVC burden, which likely has a trigerred mechanism  (60bpm  Appears to be a possible trigger)  Recommend increasing metoprolol succinate to 100 mg po bid  Reduce Valsartan to 80 mg po daily, as it may be contributing to light headedness  If further BP control is needed, please consider norvasc or nifedipine.  EP will follow up in clinic and likely reduce rate back to 60 bpm with hope of reduced PVC burden on higher dose of BB.    Bradycardia  Pt's DC PPM is functioning properly  Morales is likely errorneous in light of PVCs  Please see Plan under PVC.        Please call if there are any further questions or concerns.     Ivania Clemente MD  Cardiac Electrophysiology  [facility]

## 2020-09-17 NOTE — PLAN OF CARE
Problem: Occupational Therapy Goal  Goal: Occupational Therapy Goal  Outcome: Met     No goals established this date 2/2 pt with no functional impairments noted that would require skilled OT in this setting.         Екатерина Michel OTR/L  9/17/20

## 2020-09-17 NOTE — HPI
78 yo M Afib (currently on Eliquis and metoprolol), Cardiomyopathy, HFpEF (EF 50-55%), complete AV block s/p pacemaker presents for lightheadedness and noted low heart rate x1 day. The patient states that he has been having lightheadedness since he woke up yesterday. He has been checking his own pulse and noted that it gets as low as the 40's when he feels lightheaded. Today, he also reported some R sided chest pain with radiation down his R arm that occurred twice over the last 24 hrs at the same time as the lightheadedness. The patient has had similar symptoms on and off for the last few weeks, and he has presented to the ED two other times. On 9/6, he was admitted to [facility] and his pacemaker was interrogated and reported to be working correctly and pt. Was discharged. He was instructed to take his metoprolol succinate 100 mg PRN rather than daily for palpitations. On 9/9, the patient presented to the ED here for bradycardai after he took his metoprolol for reported palpitations with HR in the 90's. After taking the metoprolol, his rate dropped again to the 40's and he developed some lightheadedness and chest pain. He was instructed to stop his metoprolol all together at that time. On 9/14, he followed up with cardiology and instructed to restart his metoprolol succinate at 50 mg BID. Pt. Developed current symptoms shortly after restarting the metoprolol.

## 2020-09-17 NOTE — PLAN OF CARE
PT evaluation complete. No goals established as pt is baseline with mobility and has no acute PT needs at this time. D/C from PT services.     Nik Coon, SPT  9/17/2020      Problem: Physical Therapy Goal  Goal: Physical Therapy Goal  Outcome: Met

## 2020-09-17 NOTE — ASSESSMENT & PLAN NOTE
This is a 79-year-old gentleman, cardiology being consulted for symptomatic bradycardia.  Patient with AFib/complete heart block has a dual-chamber pacemaker implanted since 2001 followed by change change in 2018.  This is his 3rd episode of symptomatic bradycardia and 3rd ER visit.  He is noted to have significant PVC burden 25,000. He was seen in EP Clinic and was recommended to resume metoprolol 50 mg b.i.d. to suppress PVC however patient developed symptomatic bradycardia, which is thought to be falsely low secondary to inaccurate heart rate reading with ongoing PVCs.     -Medtronic dual-chamber pacemaker interrogated    -electrophysiology service will follow up with morning  -keep patient on telemetry monitor her PVCs and the pause more pacemaker defect  -can home metoprolol for now  -will likely need Holter monitor on discharge  -resume his home medication including Eliquis

## 2020-09-17 NOTE — PROGRESS NOTES
[facility] Hospital Medicine  Progress Note    Patient Name: Aldo Doll  MRN: [id number]  Patient Class: OP- Observation   Admission Date: 9/16/2020  Length of Stay: 0 days  Attending Physician: Hakan Treviño MD  Primary Care Provider: Anthony Denton MD    [place] [facility] Medicine Team: [facility] [place] [facility] [place] Briana Carrasco PA-C    Subjective:     Principal Problem:AV block, complete        HPI:  80 yo M Afib (currently on Eliquis and metoprolol), Cardiomyopathy, HFpEF (EF 50-55%), complete AV block s/p pacemaker presents for lightheadedness and noted low heart rate x1 day. The patient states that he has been having lightheadedness since he woke up yesterday. He has been checking his own pulse and noted that it gets as low as the 40's when he feels lightheaded. Today, he also reported some R sided chest pain with radiation down his R arm that occurred twice over the last 24 hrs at the same time as the lightheadedness. The patient has had similar symptoms on and off for the last few weeks, and he has presented to the ED two other times. On 9/6, he was admitted to [facility] and his pacemaker was interrogated and reported to be working correctly and pt. Was discharged. He was instructed to take his metoprolol succinate 100 mg PRN rather than daily for palpitations. On 9/9, the patient presented to the ED here for bradycardai after he took his metoprolol for reported palpitations with HR in the 90's. After taking the metoprolol, his rate dropped again to the 40's and he developed some lightheadedness and chest pain. He was instructed to stop his metoprolol all together at that time. On 9/14, he followed up with cardiology and instructed to restart his metoprolol succinate at 50 mg BID. Pt. Developed current symptoms shortly after restarting the metoprolol.    Overview/Hospital Course:  Patient with PPM admitted for lightheadedness suspected secondary to bradycardia/ PVCs. VSS. EKG appears paced. Trop  WNL. Cardiology consulted in ED, PPM interrogated multiple times, functioning properly. Significant PVC burden, bradycardia is likely erroneous in setting of PVCs. Recommend MTP 100mg PO BID and reducing Vlasartan to 80mg PO daily. If further BP control is needed, consider norvasc or nifedipine. Monitor how pt tolerates medication adjustments over night.    Interval History: No acute events overnight. Pt seen at bedside resting in NAD with wife present. Appreciate EP recs-- starting mtp xl 100mg PO bid and decreasing valsartan to 80mg daily. Monitor BP and sxs overnight with medication adjustments   Holter monitor ordered for dc    Review of Systems   Constitutional: Negative for fever.   Respiratory: Negative for chest tightness and shortness of breath.    Cardiovascular: Positive for chest pain and palpitations.   Gastrointestinal: Negative for abdominal pain, nausea and vomiting.   Genitourinary: Negative for difficulty urinating and dysuria.   Musculoskeletal: Negative for arthralgias and back pain.   Skin: Negative for color change.   Neurological: Negative for syncope.   Psychiatric/Behavioral: Negative for agitation and confusion.     Objective:     Vital Signs (Most Recent):  Temp: 98.3 °F (36.8 °C) (09/17/20 0812)  Pulse: 71 (09/17/20 1107)  Resp: 18 (09/17/20 0812)  BP: (!) 152/70 (09/17/20 0812)  SpO2: 96 % (09/17/20 1107) Vital Signs (24h Range):  Temp:  [97.3 °F (36.3 °C)-98.3 °F (36.8 °C)] 98.3 °F (36.8 °C)  Pulse:  [59-80] 71  Resp:  [16-20] 18  SpO2:  [96 %-99 %] 96 %  BP: (116-154)/(61-82) 152/70     Weight: 112.9 kg (248 lb 14.4 oz)  Body mass index is 33.76 kg/m².    Intake/Output Summary (Last 24 hours) at 9/17/2020 1502  Last data filed at 9/17/2020 0600  Gross per 24 hour   Intake 240 ml   Output 1000 ml   Net -760 ml      Physical Exam  Constitutional:       General: He is not in acute distress.     Appearance: Normal appearance. He is not ill-appearing.   HENT:      Head: Normocephalic and  atraumatic.   Cardiovascular:      Rate and Rhythm: Normal rate and regular rhythm.      Pulses: Normal pulses.   Pulmonary:      Effort: Pulmonary effort is normal. No respiratory distress.   Abdominal:      General: Abdomen is flat.      Palpations: Abdomen is soft.   Musculoskeletal:         General: No swelling.   Skin:     General: Skin is warm and dry.   Neurological:      General: No focal deficit present.      Mental Status: He is alert and oriented to person, place, and time.   Psychiatric:         Mood and Affect: Mood normal.         Behavior: Behavior normal.         Significant Labs: All pertinent labs within the past 24 hours have been reviewed.    Significant Imaging: I have reviewed all pertinent imaging results/findings within the past 24 hours.      Assessment/Plan:      * AV block, complete  Hx AV block, complete  -Patient with AFib/complete heart block has a dual-chamber having episodes of bradycardia, no abnormalities noted on interrogation per cardiology  -Monitor on telemetry, hold B-blocker  -will likely need Holter monitor on discharge per cardiology  - consult EP:   Pt noted to have significant PVC burden, which was the likely cause of his symptoms.  Device interrogated multiple times including EP staff, and it is functioning properly.   Recommend increasing metoprolol succinate to 100 mg po bid  Reduce Valsartan to 80 mg po daily, as it may be contributing to light headedness  If further BP control is needed, please consider norvasc or nifedipine.  F/u in EP clinic  Monitor BP/sxs overnight with medication adjustments    Atrial fibrillation  Current long term use of AC  -On EKG today he is A-sensed V-Paced. Holding B-blocker as above  Continue eliquis for AC      Essential hypertension  -Holding toprol-XL as above, continue Valsartan 160mg po qday  Per EP:  Restart toprol xl 100mg PO BID, decrease valsartan to 80mg daily  - can add nifedipine or norvasc for tighter bp control      VTE Risk  Mitigation (From admission, onward)         Ordered     apixaban tablet 5 mg  2 times daily      09/16/20 2042     IP VTE HIGH RISK PATIENT  Once      09/16/20 2042                Discharge Planning   KALANI: 9/18/2020     Code Status: Full Code   Is the patient medically ready for discharge?: No    Reason for patient still in hospital (select all that apply): Patient trending condition  Discharge Plan A: Home with family                  Briana Carrasco PA-C  Department of Hospital Medicine   [facility]

## 2020-09-17 NOTE — CONSULTS
[facility]  Cardiology  Consult Note    Patient Name: Aldo Doll  MRN: [id number]  Admission Date: 9/16/2020  Hospital Length of Stay: 0 days  Code Status: Prior   Attending Provider: Eddie Garza DO   Consulting Provider: Lata Hess MD  Primary Care Physician: Anthony Denton MD  Principal Problem:<principal problem not specified>    Patient information was obtained from patient and ER records.     Inpatient consult to Cardiology  Consult performed by: Lata Gilmore MD  Consult ordered by: Eddie Garza DO        Subjective:     Chief Complaint:  Bradycardia     HPI:   Mr. Doll is an 80yo man, Cardiology consulted for symptomatic bradycardia, this is a patient of Dr. Monteiro with a past medical history of HLD, complete heart block with placement of dual chamber PPM (medtronic), CAD, atrial fibrillation, atrial flutter atypical with Yamil Vasc of 4 on Eliquis, nonischemic cardiomyopathy (nonobstructive CAD) and HTN.  This is his 3rd ER visit in the last month.  Patient reports having off and on lightheadedness, dizziness, presyncopal symptoms any time he would check his pulse during this episode would notice significant bradycardia heart rate 30s to 40s.  He was seen at [facility] on 09/06 297 with similar symptoms, his Medtronic device was interrogated by industry rep did not identify any pacemaker concerns hence he was discharged.  On 914-20 he was seen by EP midlevel Carolina Looney, did not noted to have any pacemaker issues and recommended to resume metoprolol 50 mg b.i.d. with Holter monitor in 3 weeks to evaluate PVC burden.  Red to take his metoprolol yesterday since then he has noticed again symptomatic bradycardia with heart rates ranging from 30 is in 40s and presyncopal symptoms.  He states these episodes last approximately 5 minutes period and now presenting to the ER concerning for pacemaker malfunction.    Medtronic dual chamber pacemaker  interrogated;  Mode DDD, HR 60 130   AS-VS 0.7%, AS- 19.1%, AP-VS 1%, AP- 79.1%  PVC singles 25,229, PVC runs 33  Estimate battery time 3 years,   Voltage 2.76V   Curretn 14.69   Impedance 1,431ohms   Threshold 0.4  Sense >0.5    Past Medical History:   Diagnosis Date    *Atrial fibrillation     Anticoagulant long-term use     Arthritis     Atrial fibrillation     Cardiomyopathy     CHF (congestive heart failure)     Coronary artery disease     Heart block     Hyperlipidemia     Hypertension        Past Surgical History:   Procedure Laterality Date    CHOLECYSTECTOMY      HERNIA REPAIR      INSERT / REPLACE / REMOVE PACEMAKER         Review of patient's allergies indicates:   Allergen Reactions    Pradaxa [dabigatran etexilate] Itching and Rash    Ace inhibitors      Other reaction(s): cough    Chocolate flavor      Other reaction(s): Rash       No current facility-administered medications on file prior to encounter.      Current Outpatient Medications on File Prior to Encounter   Medication Sig    ELIQUIS 5 mg Tab Take 1 tablet by mouth twice daily    fish oil-omega-3 fatty acids 300-1,000 mg capsule Take 2 capsules by mouth once daily.     metoprolol succinate (TOPROL-XL) 50 MG 24 hr tablet Take 1 tablet (50 mg total) by mouth 2 (two) times daily.    valsartan (DIOVAN) 160 MG tablet Take 1 tablet (160 mg total) by mouth once daily.     Family History     Problem Relation (Age of Onset)    Cancer Mother, Sister    Mental illness Son        Tobacco Use    Smoking status: Never Smoker    Smokeless tobacco: Never Used   Substance and Sexual Activity    Alcohol use: Yes     Alcohol/week: 3.0 standard drinks     Types: 3 Glasses of wine per week     Comment: daily    Drug use: No    Sexual activity: Yes     Partners: Female     Review of Systems   Constitution: Positive for malaise/fatigue. Negative for chills, decreased appetite, diaphoresis, fever, weight gain and weight loss.   Eyes:  Negative for blurred vision.   Cardiovascular: Positive for near-syncope and syncope. Negative for chest pain, dyspnea on exertion, irregular heartbeat, leg swelling, orthopnea and palpitations.   Respiratory: Negative for cough, shortness of breath, snoring and wheezing.    Gastrointestinal: Negative for abdominal pain, nausea and vomiting.   Genitourinary: Negative for bladder incontinence and urgency.     Objective:     Vital Signs (Most Recent):  Temp: 97.5 °F (36.4 °C) (09/16/20 1529)  Pulse: 60 (09/16/20 1815)  Resp: 18 (09/16/20 1815)  BP: 128/66 (09/16/20 1815)  SpO2: 99 % (09/16/20 1815) Vital Signs (24h Range):  Temp:  [97.5 °F (36.4 °C)] 97.5 °F (36.4 °C)  Pulse:  [60-80] 60  Resp:  [16-20] 18  SpO2:  [97 %-99 %] 99 %  BP: (128-154)/(66-72) 128/66        There is no height or weight on file to calculate BMI.    SpO2: 99 %  O2 Device (Oxygen Therapy): room air    No intake or output data in the 24 hours ending 09/16/20 1922    Lines/Drains/Airways     Peripheral Intravenous Line                 Peripheral IV - Single Lumen 09/16/20 20 G Left Hand less than 1 day                Physical Exam   Constitutional: He is oriented to person, place, and time. He appears well-developed and well-nourished. No distress.   Neck: No JVD present.   Cardiovascular: Normal rate, regular rhythm, normal heart sounds and intact distal pulses. Exam reveals no gallop and no friction rub.   No murmur heard.  Pulmonary/Chest: Effort normal and breath sounds normal. No respiratory distress. He has no wheezes. He has no rales. He exhibits no tenderness.   Abdominal: Soft. Bowel sounds are normal. He exhibits no distension and no mass. There is no abdominal tenderness. There is no rebound and no guarding.   Musculoskeletal: Normal range of motion.         General: No edema.   Neurological: He is alert and oriented to person, place, and time.   Skin: Skin is warm and dry. He is not diaphoretic. No erythema.   Nursing note and vitals  reviewed.      Significant Labs:   BMP:   Recent Labs   Lab 09/16/20  1643         K 4.2      CO2 23   BUN 22   CREATININE 1.0   CALCIUM 9.7   MG 2.0   , CMP   Recent Labs   Lab 09/16/20  1643      K 4.2      CO2 23      BUN 22   CREATININE 1.0   CALCIUM 9.7   PROT 7.3   ALBUMIN 4.3   BILITOT 0.9   ALKPHOS 54*   AST 29   ALT 40   ANIONGAP 10   ESTGFRAFRICA >60.0   EGFRNONAA >60.0   , CBC   Recent Labs   Lab 09/16/20  1643   WBC 5.42   HGB 13.5*   HCT 40.5   *   , INR No results for input(s): INR, PROTIME in the last 48 hours. and Lipid Panel No results for input(s): CHOL, HDL, LDLCALC, TRIG, CHOLHDL in the last 48 hours.    Significant Imaging: Echocardiogram:   Transthoracic echo (TTE) complete (Cupid Only):   Results for orders placed or performed during the hospital encounter of 09/05/20   Echo Color Flow Doppler? Yes   Result Value Ref Range    AV mean gradient 5 mmHg    Ao peak serafin 1.63 m/s    Ao VTI 31.45 cm    IVRT 114.19 msec    IVS 1.30 (A) 0.6 - 1.1 cm    LA size 4.34 cm    Left Atrium Major Axis 4.90 cm    Left Atrium Minor Axis 4.68 cm    LVIDD 5.17 3.5 - 6.0 cm    LVIDS 3.69 2.1 - 4.0 cm    LVOT diameter 2.35 cm    LVOT peak VTI 20.34 cm    PW 1.31 (A) 0.6 - 1.1 cm    MV Peak A Serafin 0.59 m/s    E wave decelartion time 224.73 msec    MV Peak E Serafin 0.85 m/s    PV Peak D Serafin 0.37 m/s    PV Peak S Serafin 0.60 m/s    RA Major Axis 4.52 cm    RA Width 3.90 cm    RVDD 3.86 cm    Sinus 3.31 cm    TAPSE 1.96 cm    TR Max Serafin 2.73 m/s    LA WIDTH 5.03 cm    Ao root annulus 3.68 cm    AORTIC VALVE CUSP SEPERATION 1.77 cm    PV PEAK VELOCITY 0.96 cm/s    MV stenosis pressure 1/2 time 65.17 ms    LV Diastolic Volume 127.81 mL    LV Systolic Volume 57.72 mL    LVOT peak serafin 1.06 m/s    FS 29 %    LA volume 88.83 cm3    LV mass 277.43 g    Left Ventricle Relative Wall Thickness 0.51 cm    AV valve area 2.80 cm2    AV Velocity Ratio 0.65     AV index (prosthetic) 0.65     MV  valve area p 1/2 method 3.38 cm2    E/A ratio 1.44     Pulm vein S/D ratio 1.62     LVOT area 4.3 cm2    LVOT stroke volume 88.18 cm3    AV peak gradient 11 mmHg    LV Systolic Volume Index 23.9 mL/m2    LV Diastolic Volume Index 52.97 mL/m2    LA Volume Index 36.8 mL/m2    LV Mass Index 115 g/m2    Triscuspid Valve Regurgitation Peak Gradient 30 mmHg    BSA 2.24 m2    Right Atrial Pressure (from IVC) 3 mmHg    TV rest pulmonary artery pressure 33 mmHg    Narrative    · Low normal left ventricular systolic function. The estimated ejection   fraction is 50%.  · Indeterminate left ventricular diastolic function.  · Septal wall has abnormal motion consistent with right ventricular   pacemaker.  · Moderate left atrial enlargement.  · Normal right ventricular systolic function.  · Mild right atrial enlargement.  · Mild aortic regurgitation.  · Normal central venous pressure (3 mmHg).  · The estimated PA systolic pressure is 33 mmHg.  · Mild concentric left ventricular hypertrophy.        Assessment and Plan:     AV block, complete  This is a 79-year-old gentleman, cardiology being consulted for symptomatic bradycardia.  Patient with AFib/complete heart block has a dual-chamber pacemaker implanted since 2001 followed by change change in 2018.  This is his 3rd episode of symptomatic bradycardia and 3rd ER visit.  He is noted to have significant PVC burden 25,000. He was seen in EP Clinic and was recommended to resume metoprolol 50 mg b.i.d. to suppress PVC however patient developed symptomatic bradycardia, which is thought to be falsely low secondary to inaccurate heart rate reading with ongoing PVCs.     -Medtronic dual-chamber pacemaker interrogated    -electrophysiology service will follow up with morning  -keep patient on telemetry monitor her PVCs and the pause more pacemaker defect  -can home metoprolol for now  -will likely need Holter monitor on discharge  -resume his home medication including Eliquis        VTE  Risk Mitigation (From admission, onward)    None          Thank you for your consult.     Lata Hess MD  Cardiology   [facility]

## 2020-09-17 NOTE — HOSPITAL COURSE
Patient with PPM admitted for lightheadedness suspected secondary to bradycardia/ PVCs. VSS. EKG appeared paced. Trop WNL. Cardiology consulted in ED, PPM interrogated multiple times, functioning properly. Significant PVC burden, bradycardia is likely erroneous in setting of PVCs. Recommended MTP XL 100mg PO BID and reducing Valsartan to 80mg PO daily. If further BP control is needed, consider norvasc or nifedipine. Patient with repeat episode prior to discharge, discussed with EP who stated patients symptoms due to PVCs and beta-blocker must be maxed to eliminate PVCs.

## 2020-09-17 NOTE — ASSESSMENT & PLAN NOTE
Pt's DC PPM is functioning properly  Morales is likely errorneous in light of PVCs  Please see Plan under PVC.

## 2020-09-17 NOTE — SUBJECTIVE & OBJECTIVE
Past Medical History:   Diagnosis Date    *Atrial fibrillation     Anticoagulant long-term use     Arthritis     Atrial fibrillation     Cardiomyopathy     CHF (congestive heart failure)     Coronary artery disease     Heart block     Hyperlipidemia     Hypertension        Past Surgical History:   Procedure Laterality Date    CHOLECYSTECTOMY      HERNIA REPAIR      INSERT / REPLACE / REMOVE PACEMAKER         Review of patient's allergies indicates:   Allergen Reactions    Pradaxa [dabigatran etexilate] Itching and Rash    Ace inhibitors      Other reaction(s): cough    Chocolate flavor      Other reaction(s): Rash       No current facility-administered medications on file prior to encounter.      Current Outpatient Medications on File Prior to Encounter   Medication Sig    ELIQUIS 5 mg Tab Take 1 tablet by mouth twice daily    fish oil-omega-3 fatty acids 300-1,000 mg capsule Take 2 capsules by mouth once daily.     metoprolol succinate (TOPROL-XL) 50 MG 24 hr tablet Take 1 tablet (50 mg total) by mouth 2 (two) times daily.    valsartan (DIOVAN) 160 MG tablet Take 1 tablet (160 mg total) by mouth once daily.     Family History     Problem Relation (Age of Onset)    Cancer Mother, Sister    Mental illness Son        Tobacco Use    Smoking status: Never Smoker    Smokeless tobacco: Never Used   Substance and Sexual Activity    Alcohol use: Yes     Alcohol/week: 3.0 standard drinks     Types: 3 Glasses of wine per week     Comment: daily    Drug use: No    Sexual activity: Yes     Partners: Female     Review of Systems   Constitution: Positive for malaise/fatigue. Negative for chills, decreased appetite, diaphoresis, fever, weight gain and weight loss.   Eyes: Negative for blurred vision.   Cardiovascular: Positive for near-syncope and syncope. Negative for chest pain, dyspnea on exertion, irregular heartbeat, leg swelling, orthopnea and palpitations.   Respiratory: Negative for cough,  shortness of breath, snoring and wheezing.    Gastrointestinal: Negative for abdominal pain, nausea and vomiting.   Genitourinary: Negative for bladder incontinence and urgency.     Objective:     Vital Signs (Most Recent):  Temp: 98.3 °F (36.8 °C) (09/17/20 0812)  Pulse: 71 (09/17/20 1107)  Resp: 18 (09/17/20 0812)  BP: (!) 152/70 (09/17/20 0812)  SpO2: 96 % (09/17/20 1107) Vital Signs (24h Range):  Temp:  [97.3 °F (36.3 °C)-98.3 °F (36.8 °C)] 98.3 °F (36.8 °C)  Pulse:  [59-80] 71  Resp:  [16-20] 18  SpO2:  [96 %-99 %] 96 %  BP: (116-154)/(61-82) 152/70       Weight: 112.9 kg (248 lb 14.4 oz)  Body mass index is 33.76 kg/m².    SpO2: 96 %  O2 Device (Oxygen Therapy): room air    Physical Exam   Constitutional: He is oriented to person, place, and time. He appears well-developed and well-nourished. No distress.   Neck: No JVD present.   Cardiovascular: Normal rate, regular rhythm, normal heart sounds and intact distal pulses. Exam reveals no gallop and no friction rub.   No murmur heard.  Pulmonary/Chest: Effort normal and breath sounds normal. No respiratory distress. He has no wheezes. He has no rales. He exhibits no tenderness.   Abdominal: Soft. Bowel sounds are normal. He exhibits no distension and no mass. There is no abdominal tenderness. There is no rebound and no guarding.   Musculoskeletal: Normal range of motion.         General: No edema.   Neurological: He is alert and oriented to person, place, and time.   Skin: Skin is warm and dry. He is not diaphoretic. No erythema.   Nursing note and vitals reviewed.      Significant Labs:   CMP:   Recent Labs   Lab 09/16/20  1643 09/17/20  0714    138   K 4.2 4.4    106   CO2 23 26    100   BUN 22 18   CREATININE 1.0 0.8   CALCIUM 9.7 9.5   PROT 7.3  --    ALBUMIN 4.3  --    BILITOT 0.9  --    ALKPHOS 54*  --    AST 29  --    ALT 40  --    ANIONGAP 10 6*   ESTGFRAFRICA >60.0 >60.0   EGFRNONAA >60.0 >60.0    and CBC:   Recent Labs   Lab  09/16/20  1643 09/17/20  0714   WBC 5.42 5.53   HGB 13.5* 13.7*   HCT 40.5 40.6   * 102*       Significant Imaging: Echocardiogram:   2D echo with color flow doppler:   Results for orders placed or performed during the hospital encounter of 03/31/17   2D echo with color flow doppler   Result Value Ref Range    QEF 40 (A) 55 - 65    Diastolic Dysfunction Yes (A)     Aortic Valve Regurgitation TRIVIAL     Est. PA Systolic Pressure 26.04     Tricuspid Valve Regurgitation MILD     Narrative    Date of Procedure: 03/31/2017        TEST DESCRIPTION   Technical Quality: This is a technically challenging study.     General: A catheter is present in the right-sided cardiac chambers.     Aorta: The aortic root is normal in size, measuring 3.1 cm at sinotubular junction and 3.6 cm at Sinuses of Valsalva. The proximal ascending aorta is normal in size, measuring 4.0 cm across.     Left Atrium: The left atrial volume index is mildly enlarged, measuring 41.29 cc/m2.     Left Ventricle: The left ventricle is normal in size, with an end-diastolic diameter of 6.0 cm, and an end-systolic diameter of 4.8 cm. LV wall thickness is normal, with the septum measuring 1.1 cm and the posterior wall measuring 1.0 cm across. Relative   wall thickness was normal at 0.33, and the LV mass index was increased at 140.4 g/m2 consistent with eccentric left ventricular hypertrophy. Global left ventricular systolic function appears mildly to moderately depressed. Visually estimated ejection   fraction is 40-45%. The LV Doppler derived stroke volume equals 117.0 ccs.   The E/e'(lat) is 8.  This along with the following abnormalities (JERRY = 41.29) suggests significant diastolic dysfunction.     Right Atrium: The right atrium is enlarged, measuring 6.1 cm in length and 3.6 cm in width in the apical view.     Right Ventricle: The right ventricle is normal in size measuring 3.8 cm at the base in the apical right ventricle-focused view. Global right  ventricular systolic function appears normal. There is abnormal septal motion consistent with paced rhythm.   Tricuspid annular plane systolic excursion (TAPSE) is 1.8 cm. The estimated PA systolic pressure is 26 mmHg.     Aortic Valve:  Additionally, there is trivial aortic regurgitation. Aortic valve is normal in structure with normal leaflet mobility.     Mitral Valve:  Mitral valve is normal in structure with normal leaflet mobility.     Tricuspid Valve:  There is mild tricuspid regurgitation. Tricuspid valve is normal in structure with normal leaflet mobility.     Pulmonary Valve:  The pulmonic valve is not well seen.     IVC: IVC is normal in size and collapses > 50% with a sniff, suggesting normal right atrial pressure of 3 mmHg.     Intracavitary: There is no evidence of pericardial effusion, intracavity mass, thrombi, or vegetation.         CONCLUSIONS     1 - Eccentric hypertrophy.     2 - Mildly to moderately depressed left ventricular systolic function (EF 40-45%).     3 - Normal right ventricular systolic function .     4 - Left ventricular diastolic dysfunction.     5 - Biatrial enlargement.     6 - Mild tricuspid regurgitation.     7 - The estimated PA systolic pressure is 26 mmHg.             This document has been electronically    SIGNED BY: Nik Richardson MD On: 03/31/2017 09:06    and Transthoracic echo (TTE) complete (Cupid Only):   Results for orders placed or performed during the hospital encounter of 09/05/20   Echo Color Flow Doppler? Yes   Result Value Ref Range    AV mean gradient 5 mmHg    Ao peak shawn 1.63 m/s    Ao VTI 31.45 cm    IVRT 114.19 msec    IVS 1.30 (A) 0.6 - 1.1 cm    LA size 4.34 cm    Left Atrium Major Axis 4.90 cm    Left Atrium Minor Axis 4.68 cm    LVIDD 5.17 3.5 - 6.0 cm    LVIDS 3.69 2.1 - 4.0 cm    LVOT diameter 2.35 cm    LVOT peak VTI 20.34 cm    PW 1.31 (A) 0.6 - 1.1 cm    MV Peak A Shawn 0.59 m/s    E wave decelartion time 224.73 msec    MV Peak E Shawn 0.85 m/s    PV  Peak D Serafin 0.37 m/s    PV Peak S Serafin 0.60 m/s    RA Major Axis 4.52 cm    RA Width 3.90 cm    RVDD 3.86 cm    Sinus 3.31 cm    TAPSE 1.96 cm    TR Max Serafin 2.73 m/s    LA WIDTH 5.03 cm    Ao root annulus 3.68 cm    AORTIC VALVE CUSP SEPERATION 1.77 cm    PV PEAK VELOCITY 0.96 cm/s    MV stenosis pressure 1/2 time 65.17 ms    LV Diastolic Volume 127.81 mL    LV Systolic Volume 57.72 mL    LVOT peak serafin 1.06 m/s    FS 29 %    LA volume 88.83 cm3    LV mass 277.43 g    Left Ventricle Relative Wall Thickness 0.51 cm    AV valve area 2.80 cm2    AV Velocity Ratio 0.65     AV index (prosthetic) 0.65     MV valve area p 1/2 method 3.38 cm2    E/A ratio 1.44     Pulm vein S/D ratio 1.62     LVOT area 4.3 cm2    LVOT stroke volume 88.18 cm3    AV peak gradient 11 mmHg    LV Systolic Volume Index 23.9 mL/m2    LV Diastolic Volume Index 52.97 mL/m2    LA Volume Index 36.8 mL/m2    LV Mass Index 115 g/m2    Triscuspid Valve Regurgitation Peak Gradient 30 mmHg    BSA 2.24 m2    Right Atrial Pressure (from IVC) 3 mmHg    TV rest pulmonary artery pressure 33 mmHg    Narrative    · Low normal left ventricular systolic function. The estimated ejection   fraction is 50%.  · Indeterminate left ventricular diastolic function.  · Septal wall has abnormal motion consistent with right ventricular   pacemaker.  · Moderate left atrial enlargement.  · Normal right ventricular systolic function.  · Mild right atrial enlargement.  · Mild aortic regurgitation.  · Normal central venous pressure (3 mmHg).  · The estimated PA systolic pressure is 33 mmHg.  · Mild concentric left ventricular hypertrophy.

## 2020-09-17 NOTE — PLAN OF CARE
Plan of care reviewed with patient. Pt did not complain of pain, or SOB. Heart rate stayed in 60s-70s during RN shift. Plan for DC tomorrow with Holter monitor. WCTM

## 2020-09-17 NOTE — PT/OT/SLP EVAL
"Physical Therapy Evaluation and Discharge Note    Patient Name:  Aldo Doll   MRN:  [id number]    Recommendations:     Discharge Recommendations:  home   Discharge Equipment Recommendations: none   Barriers to discharge: None    Assessment:     Aldo Doll is a 79 y.o. male admitted with a medical diagnosis of AV block, complete.  Pt presented in good spirits with his wife at bedside. Pt able to complete all functional mobility without physical assistance. Pt ambulated greater than household distance requiring no physical assistance and no AD. At this time, patient is functioning at their prior level of function and does not require further acute PT services.     Recent Surgery: * No surgery found *      Plan:     During this hospitalization, patient does not require further acute PT services.  Please re-consult if situation changes.      Subjective     Chief Complaint: None noted  Patient/Family Comments/goals: Patient and his wife were very positive and amenable to therapy. 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   Pain/Comfort:  · Pain Rating 1: 0/10    Patients cultural, spiritual, Christianity conflicts given the current situation: no    Living Environment:  Pt lives with wife in a single story home with 3 SAROJ without rails. Children and grandchildren live closeby and visit frequently. Shower is a walk-in shower with a chair and grab bars.   Prior to admission, patients level of function was [place] with ADLs and mobility. Pt reports being very active and walking daily.  Equipment used at home: none.  DME owned (not currently used): single point cane.  Upon discharge, patient will have assistance from wife and family.    Objective:     Communicated with nurse prior to session.  Patient found HOB elevated with telemetry, pulse ox (continuous) upon PT entry to room.    General Precautions: Standard, fall   Orthopedic " Precautions:N/A   Braces: N/A     Exams:  · Cognitive Exam:  Patient is oriented to Person, Place, Time and Situation  · Sensation:    · -       Intact  · RLE ROM: WFL  · RLE Strength: WFL  · LLE ROM: WFL  · LLE Strength: WFL    Functional Mobility:  · Bed Mobility:     · Rolling Right: modified independence from EOB with HOB elevated   · Transfers:     · Sit to Stand:  independence with no AD from EOB  · Gait: ~350 ft with supervision   · Mask donned prior to amb out of room   · Pt cued for self-pacing and rest breaks as needed  · Left lateral weight shift throughout stance phase  · Dynamic Balance: supervision for standing at bathroom sink for oral hygiene with OT      AM-PAC 6 CLICK MOBILITY  Total Score:24       Therapeutic Activities and Exercises:  Pt educated on role of PT and PT POC including plan to d/c PT services at this time. Pt educated to contact medical team if therapy needs arise during hospital admission. Pt verbalized understanding.   Pt educated on the effects of bed rest and the importance of OOB activity. Pt encouraged to sit UIC majority of day as tolerated and continue daily ambulation with nursing assist. Pt verbalized understanding.  Pt educated on importance in taking increased time during transitional movements to avoid and address lightheadedness or dizziness as needed. Pt v/u.    Pt oriented to call bell and instructed to call for staff assist with standing tasks/transfers. Pt verbalized understanding.     AM-PAC 6 CLICK MOBILITY  Total Score:24     Patient left up in chair with all lines intact, call button in reach and wife present.    GOALS:   Multidisciplinary Problems     Physical Therapy Goals     Not on file          Multidisciplinary Problems (Resolved)        Problem: Physical Therapy Goal    Goal Priority Disciplines Outcome Goal Variances Interventions   Physical Therapy Goal   (Resolved)     PT, PT/OT Met                     History:     Past Medical History:   Diagnosis Date     *Atrial fibrillation     Anticoagulant long-term use     Arthritis     Atrial fibrillation     Cardiomyopathy     CHF (congestive heart failure)     Coronary artery disease     Heart block     Hyperlipidemia     Hypertension        Past Surgical History:   Procedure Laterality Date    CHOLECYSTECTOMY      HERNIA REPAIR      INSERT / REPLACE / REMOVE PACEMAKER         Time Tracking:     PT Received On: 09/17/20  PT Start Time: 0855     PT Stop Time: 0920  PT Total Time (min): 25 min     Billable Minutes: Evaluation 15 and Therapeutic Activity 10  Co-evaluation with OT.    Nik Coon, SPT  09/17/2020

## 2020-09-17 NOTE — PT/OT/SLP EVAL
Occupational Therapy   Evaluation, Treatment, and Discharge Note    Name: Aldo Doll  MRN: [id number]  Admitting Diagnosis:  AV block, complete      Recommendations:     Discharge Recommendations: home  Discharge Equipment Recommendations:  none  Barriers to discharge:  None    Assessment:     Aldo Doll is a 79 y.o. male with a medical diagnosis of AV block, complete. Pt presented with WFL strength and ROM to complete occupations of choice. Pt tolerated evaluation well displaying no deficits affecting ADL performance. Pt performed functional tasks with minimal complaints of dizziness. Despite reports of dizziness, pt remained safe and functionally capable of completing self care, transfers, and mobility without physical assistance. At this time, patient is functioning at their prior level of function and does not require further acute OT services. OT recommending home and no post acute or DME needs.     Plan:     During this hospitalization, patient does not require further acute OT services.  Please re-consult if situation changes.    · Plan of Care Reviewed with: patient, spouse    Subjective     Chief Complaint: None   Patient/Family Comments/goals: Pt agreeable to OT/PT evaluation and OT POC.    Occupational Profile:  Living Environment: Pt lives with wife in a [facility] with 3 SAROJ. Bathroom set up: Walk in shower with grab bars and built in bench  Previous level of function: I in all ADLs/IADLs including working and driving; no recent falls  Roles and Routines: Homemaker, spouse, enjoys oyster fishing   Equipment Used at home:  none  Assistance upon Discharge: Pt will have assistance from spouse upon discharge.     Pain/Comfort:  · Pain Rating 1: 0/10  · Pain Rating Post-Intervention 1: 0/10    Patients cultural, spiritual, Bahai conflicts given the current situation: no    Objective:     Communicated with: RN prior to session.  Patient found HOB elevated with pulse ox (continuous), telemetry upon OT entry  to room.    General Precautions: Standard, fall   Orthopedic Precautions:N/A   Braces: N/A     Occupational Performance:    Bed Mobility:  HOB elevated   · Patient completed Rolling/Turning to Right with modified independence  · Patient completed Scooting/Bridging with modified independence  · Patient completed Supine to Sit with modified independence    Functional Mobility/Transfers:  · Patient completed Sit <> Stand Transfer from the bed with modified independence  with  no assistive device   · Functional Mobility: Pt ambulated household distance with SPV and no AD to prepare for household mobility to complete occupations of choice.  · No LOB noted  · No RBs required  · Normal pace maintained  · Minimal dizziness; no increase reported  · Mask donned for out of room mobility     Activities of Daily Living:  · Grooming: modified independence standing at sink to complete dental and facial hygiene   · Lower Body Dressing: moderate assistance from wife to don socks EOB    Cognitive/Visual Perceptual:  Cognitive/Psychosocial Skills:     -       Oriented to: Person, Place, Time and Situation   -       Follows Commands/attention:Follows multistep  commands  -       Communication: clear/fluent  -       Memory: No Deficits noted  -       Safety awareness/insight to disability: impaired   -       Mood/Affect/Coping skills/emotional control: Appropriate to situation  Visual/Perceptual:      -Intact no defs noted    Physical Exam:  Balance:    -       EOB: I   Standing: mod I- SPV  Postural examination/scapula alignment:    -       No postural abnormalities identified  Skin integrity: Visible skin intact  Edema:  None noted  Sensation:    -       Intact  Upper Extremity Range of Motion:     -       Right Upper Extremity: WFL  -       Left Upper Extremity: WFL  Upper Extremity Strength: -       Right Upper Extremity: WFL  -       Left Upper Extremity: WFL   Strength:    -       Right Upper Extremity: WFL  -       Left Upper  Extremity: WFL  Fine Motor Coordination:    -       Intact    AMPAC 6 Click ADL:  AMPAC Total Score: 24    Treatment & Education:  - Role of OT/ OT POC  - Self care safety/ independence  - Functional transfer/ mobility safety  - Bed mobility safety  - Importance of sitting up in chair as tolerated   - Energy conservation techniques such as pacing and taking rest breaks as needed  - Resting upon noting dizziness for fall prevention   - Discontinuation of OT services   Education:    Patient left up in chair with all lines intact, call button in reach, RN notified and wife present    GOALS:   Multidisciplinary Problems     Occupational Therapy Goals     Not on file          Multidisciplinary Problems (Resolved)        Problem: Occupational Therapy Goal    Goal Priority Disciplines Outcome Interventions   Occupational Therapy Goal   (Resolved)     OT, PT/OT Met                    History:     Past Medical History:   Diagnosis Date    *Atrial fibrillation     Anticoagulant long-term use     Arthritis     Atrial fibrillation     Cardiomyopathy     CHF (congestive heart failure)     Coronary artery disease     Heart block     Hyperlipidemia     Hypertension        Past Surgical History:   Procedure Laterality Date    CHOLECYSTECTOMY      HERNIA REPAIR      INSERT / REPLACE / REMOVE PACEMAKER         Time Tracking:     OT Date of Treatment: 09/17/20  OT Start Time: 0858  OT Stop Time: 0919  OT Total Time (min): 21 min co tx with PT     Billable Minutes:Evaluation 10  Self Care/Home Management 11    Екатерина Michel OT  9/17/2020

## 2020-09-17 NOTE — ASSESSMENT & PLAN NOTE
Hx AV block, complete  -Patient with AFib/complete heart block has a dual-chamber having episodes of bradycardia, no abnormalities noted on interrogation per cardiology  -Monitor on telemetry, hold B-blocker  -will likely need Holter monitor on discharge per cardiology  - consult EP:   Pt noted to have significant PVC burden, which was the likely cause of his symptoms.  Device interrogated multiple times including EP staff, and it is functioning properly.   Recommend increasing metoprolol succinate to 100 mg po bid  Reduce Valsartan to 80 mg po daily, as it may be contributing to light headedness  If further BP control is needed, please consider norvasc or nifedipine.  F/u in EP clinic  Monitor BP/sxs overnight with medication adjustments

## 2020-09-17 NOTE — PLAN OF CARE
09/17/20 1146   Discharge Assessment   Assessment Type Discharge Planning Assessment   Confirmed/corrected address and phone number on facesheet? Yes   Assessment information obtained from? Patient;Caregiver   Expected Length of Stay (days) 1   Communicated expected length of stay with patient/caregiver yes   Prior to hospitilization cognitive status: Alert/Oriented   Prior to hospitalization functional status: Independent   Current cognitive status: Alert/Oriented   Current Functional Status: Independent   Facility Arrived From: home   Lives With spouse   Able to Return to Prior Arrangements yes   Is patient able to care for self after discharge? Yes   Who are your caregiver(s) and their phone number(s)? Polina [phone]   Patient's perception of discharge disposition home or selfcare   Patient currently being followed by outpatient case management? No   Patient currently receives any other outside agency services? No   Equipment Currently Used at Home none   Is the patient taking medications as prescribed? yes   Does the patient have transportation home? Yes   Transportation Anticipated family or friend will provide   Does the patient receive services at the [facility]? No   Discharge Plan A Home with family   Discharge Plan B Home with family;Home   DME Needed Upon Discharge  none   Patient/Family in Agreement with Plan yes     Pt admitted 9/16/2020  3:47 PM    For Bradycardia [R00.1]  PVC's (premature ventricular contractions) [I49.3]  Irregular cardiac rhythm [I49.9]       CM to bedside for assessment. Information obtained from patient and spouse.  Pt lives with spouse in home. Pt has 3 steps to enter home. Spouse will bring patient home at discharge. Will have support from spouse at d/c. Anticipate d/c to home with no needs.    Pt is followed by Dr Romeo for cardiology at [facility].      PCP is Anthony Denton MD  [phone] (p)  [phone] (f)      Payor: BCBS [place] MEDICARE / Plan[place]  [place]AGE / Product Type: Medicare Advantage /       [id number] - [place] - [address]  [address] [place] [address]  Phone: [phone] Fax: [phone]    [facility] - [place] - [address]  [address]  [place] [address]  Phone: [phone] Fax: [phone]      Extended Emergency Contact Information  Primary Emergency Contact: Polina Linares  Address: [address] [place] [address]           [place] [address] [facility]  Home Phone: [phone]  Mobile Phone: [phone]  Relation: Spouse    Future Appointments   Date Time Provider Department Center   9/24/2020  2:40 PM HOLTER, EKG [facility]   10/15/2020 10:00 AM HOME MONITOR DEVICE CHECK, [facility] [address] Julie Haase RN  Case Management [phone]

## 2020-09-17 NOTE — PLAN OF CARE
POC reviewed with patient. VSS. Patient free of injuries and falls. Patient has no c/o pain, dizziness, lightheadedness or discomfort. Telemetry monitor showing paced rhythm in the 60's-70's. Patient admitted from ED for irregular HR / PVC's. Monitoring patient HR closely. Holding betablocker. All questions were addressed. WCTM.

## 2020-09-17 NOTE — H&P
Hospital Medicine  History and Physical Exam    Team: [facility] HOSP MED Y Tremaine Reyes MD  Admit Date: 9/16/2020  Principal Problem:  AV block, complete   Patient information was obtained from patient, past medical records and ER records.   Primary care Physician: Anthony Denton MD  Code status: Full Code    HPI: 78 yo M Afib (currently on Eliquis and metoprolol), Cardiomyopathy, HFpEF (EF 50-55%), complete AV block s/p pacemaker presents for lightheadedness and noted low heart rate x1 day. The patient states that he has been having lightheadedness since he woke up yesterday. He has been checking his own pulse and noted that it gets as low as the 40's when he feels lightheaded. Today, he also reported some R sided chest pain with radiation down his R arm that occurred twice over the last 24 hrs at the same time as the lightheadedness. The patient has had similar symptoms on and off for the last few weeks, and he has presented to the ED two other times. On 9/6, he was admitted to [facility] and his pacemaker was interrogated and reported to be working correctly and pt. Was discharged. He was instructed to take his metoprolol succinate 100 mg PRN rather than daily for palpitations. On 9/9, the patient presented to the ED here for bradycardai after he took his metoprolol for reported palpitations with HR in the 90's. After taking the metoprolol, his rate dropped again to the 40's and he developed some lightheadedness and chest pain. He was instructed to stop his metoprolol all together at that time. On 9/14, he followed up with cardiology and instructed to restart his metoprolol succinate at 50 mg BID. Pt. Developed current symptoms shortly after restarting the metoprolol.    Last TTE 9/6/20  · Low normal left ventricular systolic function. The estimated ejection fraction is 50%.  · Indeterminate left ventricular diastolic function.  · Septal wall has abnormal motion consistent with right ventricular  pacemaker.  · Moderate left atrial enlargement.  · Normal right ventricular systolic function.  · Mild right atrial enlargement.  · Mild aortic regurgitation.  · Normal central venous pressure (3 mmHg).  · The estimated PA systolic pressure is 33 mmHg.  · Mild concentric left ventricular hypertrophy.         Hemoglobin A1C   Date Value Ref Range Status   09/04/2018 5.1 4.0 - 5.6 % Final     Comment:     ADA Screening Guidelines:  5.7-6.4%  Consistent with prediabetes  >or=6.5%  Consistent with diabetes  High levels of fetal hemoglobin interfere with the HbA1C  assay. Heterozygous hemoglobin variants (HbS, HgC, etc)do  not significantly interfere with this assay.   However, presence of multiple variants may affect accuracy.     02/23/2015 5.3 4.5 - 6.2 % Final       Past Medical History: Patient has a past medical history of *Atrial fibrillation, Anticoagulant long-term use, Arthritis, Atrial fibrillation, Cardiomyopathy, CHF (congestive heart failure), Coronary artery disease, Heart block, Hyperlipidemia, and Hypertension.    Past Surgical History: Patient has a past surgical history that includes Hernia repair; Insert / replace / remove pacemaker; and Cholecystectomy.    Social History: Patient reports that he has never smoked. He has never used smokeless tobacco. He reports current alcohol use of about 3.0 standard drinks of alcohol per week. He reports that he does not use drugs.    Family History: family history includes Cancer in his mother and sister; Mental illness in his son.    Medications: reviewed     Allergies: Patient is allergic to pradaxa [dabigatran etexilate]; ace inhibitors; and chocolate flavor.    ROS  Pain Scale: 0 /10   Constitutional: no fever or chills  Respiratory: no cough or shortness of breath  Cardiovascular: Positive for chest pain, lightheadedness, bradycardia  Gastrointestinal: no nausea or vomiting, no abdominal pain or change in bowel habits  Genitourinary: no hematuria or  dysuria  Integument/Breast: no rash or pruritis  Hematologic/Lymphatic: no easy bruising or lymphadenopathy  Musculoskeletal: no arthralgias or myalgias  Neurological: no seizures or tremors  Behavioral/Psych: no depression or anxiety    PEx  Temp:  [97.5 °F (36.4 °C)]   Pulse:  [60-80]   Resp:  [16-20]   BP: (128-154)/(66-82)   SpO2:  [97 %-99 %]   Body mass index is 33.85 kg/m².   No intake or output data in the 24 hours ending 09/16/20 2054    General appearance: no distress, pt. Resting comfortably  Mental status: Alert and oriented x 3  HEENT:  conjunctivae/corneas clear, PERRL  Neck: supple, thyroid not enlarged  Pulm:   normal respiratory effort, CTA B, no c/w/r  Card: RRR, S1, S2 normal, no murmur, click, rub or gallop  Abd: soft, NT, ND, BS present; no masses, no organomegaly  Ext: no c/c/e  Pulses: 2+, symmetric  Skin: color, texture, turgor normal. No rashes or lesions  Neuro: CN II-XII grossly intact, no focal numbness or weakness, normal strength and tone     Recent Results (from the past 24 hour(s))   CBC auto differential    Collection Time: 09/16/20  4:43 PM   Result Value Ref Range    WBC 5.42 3.90 - 12.70 K/uL    RBC 4.25 (L) 4.60 - 6.20 M/uL    Hemoglobin 13.5 (L) 14.0 - 18.0 g/dL    Hematocrit 40.5 40.0 - 54.0 %    Mean Corpuscular Volume 95 82 - 98 fL    Mean Corpuscular Hemoglobin 31.8 (H) 27.0 - 31.0 pg    Mean Corpuscular Hemoglobin Conc 33.3 32.0 - 36.0 g/dL    RDW 14.2 11.5 - 14.5 %    Platelets 127 (L) 150 - 350 K/uL    MPV 9.4 9.2 - 12.9 fL    Immature Granulocytes 0.4 0.0 - 0.5 %    Gran # (ANC) 2.9 1.8 - 7.7 K/uL    Immature Grans (Abs) 0.02 0.00 - 0.04 K/uL    Lymph # 1.7 1.0 - 4.8 K/uL    Mono # 0.5 0.3 - 1.0 K/uL    Eos # 0.3 0.0 - 0.5 K/uL    Baso # 0.04 0.00 - 0.20 K/uL    nRBC 0 0 /100 WBC    Gran% 52.9 38.0 - 73.0 %    Lymph% 31.2 18.0 - 48.0 %    Mono% 8.9 4.0 - 15.0 %    Eosinophil% 5.9 0.0 - 8.0 %    Basophil% 0.7 0.0 - 1.9 %    Differential Method Automated    Comprehensive  metabolic panel    Collection Time: 09/16/20  4:43 PM   Result Value Ref Range    Sodium 137 136 - 145 mmol/L    Potassium 4.2 3.5 - 5.1 mmol/L    Chloride 104 95 - 110 mmol/L    CO2 23 23 - 29 mmol/L    Glucose 105 70 - 110 mg/dL    BUN, Bld 22 8 - 23 mg/dL    Creatinine 1.0 0.5 - 1.4 mg/dL    Calcium 9.7 8.7 - 10.5 mg/dL    Total Protein 7.3 6.0 - 8.4 g/dL    Albumin 4.3 3.5 - 5.2 g/dL    Total Bilirubin 0.9 0.1 - 1.0 mg/dL    Alkaline Phosphatase 54 (L) 55 - 135 U/L    AST 29 10 - 40 U/L    ALT 40 10 - 44 U/L    Anion Gap 10 8 - 16 mmol/L    eGFR if African American >60.0 >60 mL/min/1.73 m^2    eGFR if non African American >60.0 >60 mL/min/1.73 m^2   Troponin I #1    Collection Time: 09/16/20  4:43 PM   Result Value Ref Range    Troponin I <0.006 0.000 - 0.026 ng/mL   Magnesium    Collection Time: 09/16/20  4:43 PM   Result Value Ref Range    Magnesium 2.0 1.6 - 2.6 mg/dL   COVID-19 Rapid Screening    Collection Time: 09/16/20  7:30 PM   Result Value Ref Range    SARS-CoV-2 RNA, Amplification, Qual Negative Negative       No results for input(s): POCTGLUCOSE in the last 168 hours.    Active Hospital Problems    Diagnosis  POA    PVC's (premature ventricular contractions) [I49.3]  Yes    AV block, complete [I44.2]  Yes     S/p Dual chamber PPM (Medtronic)        Resolved Hospital Problems   No resolved problems to display.         Assessment and Plan:  Symptomatic Bradycardia  Hx AV block, complete  -Patient with AFib/complete heart block has a dual-chamber having episodes of bradycardia, no abnormalities noted on interrogation per caerdiology  -Monitor on telemetry, hold B-blocker  -Consult EP in am  -will likely need Holter monitor on discharge per cardiology    Atrial fibrillation  Current long-term use of AC  -On EKG today he is A-sensed V-Paced. Holding B-blocker as above  Continue eliquis for AC     Essential hypertension  -Holding toprol-XL as above, continue Valsartan 160mg po qday     DVT PPx:  Katia Reyes MD  [facility] Medicine Staff  [phone] pager

## 2020-09-18 ENCOUNTER — CLINICAL SUPPORT (OUTPATIENT)
Dept: CARDIOLOGY | Facility: HOSPITAL | Age: 80
End: 2020-09-18
Attending: HOSPITALIST
Payer: MEDICARE

## 2020-09-18 VITALS
DIASTOLIC BLOOD PRESSURE: 62 MMHG | WEIGHT: 248.88 LBS | RESPIRATION RATE: 17 BRPM | BODY MASS INDEX: 33.71 KG/M2 | HEART RATE: 72 BPM | HEIGHT: 72 IN | TEMPERATURE: 98 F | SYSTOLIC BLOOD PRESSURE: 132 MMHG | OXYGEN SATURATION: 97 %

## 2020-09-18 DIAGNOSIS — I49.3 PVC'S (PREMATURE VENTRICULAR CONTRACTIONS): ICD-10-CM

## 2020-09-18 LAB
ANION GAP SERPL CALC-SCNC: 9 MMOL/L (ref 8–16)
BASOPHILS # BLD AUTO: 0.04 K/UL (ref 0–0.2)
BASOPHILS NFR BLD: 0.8 % (ref 0–1.9)
BUN SERPL-MCNC: 17 MG/DL (ref 8–23)
CALCIUM SERPL-MCNC: 9.4 MG/DL (ref 8.7–10.5)
CHLORIDE SERPL-SCNC: 104 MMOL/L (ref 95–110)
CO2 SERPL-SCNC: 24 MMOL/L (ref 23–29)
CREAT SERPL-MCNC: 0.7 MG/DL (ref 0.5–1.4)
DIFFERENTIAL METHOD: ABNORMAL
EOSINOPHIL # BLD AUTO: 0.4 K/UL (ref 0–0.5)
EOSINOPHIL NFR BLD: 7.9 % (ref 0–8)
ERYTHROCYTE [DISTWIDTH] IN BLOOD BY AUTOMATED COUNT: 14 % (ref 11.5–14.5)
EST. GFR  (AFRICAN AMERICAN): >60 ML/MIN/1.73 M^2
EST. GFR  (NON AFRICAN AMERICAN): >60 ML/MIN/1.73 M^2
GLUCOSE SERPL-MCNC: 100 MG/DL (ref 70–110)
HCT VFR BLD AUTO: 39.7 % (ref 40–54)
HGB BLD-MCNC: 14 G/DL (ref 14–18)
IMM GRANULOCYTES # BLD AUTO: 0.03 K/UL (ref 0–0.04)
IMM GRANULOCYTES NFR BLD AUTO: 0.6 % (ref 0–0.5)
LYMPHOCYTES # BLD AUTO: 1.6 K/UL (ref 1–4.8)
LYMPHOCYTES NFR BLD: 30.1 % (ref 18–48)
MAGNESIUM SERPL-MCNC: 2.1 MG/DL (ref 1.6–2.6)
MCH RBC QN AUTO: 32.3 PG (ref 27–31)
MCHC RBC AUTO-ENTMCNC: 35.3 G/DL (ref 32–36)
MCV RBC AUTO: 92 FL (ref 82–98)
MONOCYTES # BLD AUTO: 0.5 K/UL (ref 0.3–1)
MONOCYTES NFR BLD: 9.2 % (ref 4–15)
NEUTROPHILS # BLD AUTO: 2.7 K/UL (ref 1.8–7.7)
NEUTROPHILS NFR BLD: 51.4 % (ref 38–73)
NRBC BLD-RTO: 0 /100 WBC
PLATELET # BLD AUTO: 124 K/UL (ref 150–350)
PMV BLD AUTO: 9.3 FL (ref 9.2–12.9)
POTASSIUM SERPL-SCNC: 4 MMOL/L (ref 3.5–5.1)
RBC # BLD AUTO: 4.33 M/UL (ref 4.6–6.2)
SODIUM SERPL-SCNC: 137 MMOL/L (ref 136–145)
TROPONIN I SERPL DL<=0.01 NG/ML-MCNC: 0.01 NG/ML (ref 0–0.03)
WBC # BLD AUTO: 5.21 K/UL (ref 3.9–12.7)

## 2020-09-18 PROCEDURE — 84484 ASSAY OF TROPONIN QUANT: CPT

## 2020-09-18 PROCEDURE — 93271 ECG/MONITORING AND ANALYSIS: CPT

## 2020-09-18 PROCEDURE — 36415 COLL VENOUS BLD VENIPUNCTURE: CPT

## 2020-09-18 PROCEDURE — 93010 ELECTROCARDIOGRAM REPORT: CPT | Mod: ,,, | Performed by: INTERNAL MEDICINE

## 2020-09-18 PROCEDURE — 85025 COMPLETE CBC W/AUTO DIFF WBC: CPT

## 2020-09-18 PROCEDURE — 80048 BASIC METABOLIC PNL TOTAL CA: CPT

## 2020-09-18 PROCEDURE — 99217 PR OBSERVATION CARE DISCHARGE: CPT | Mod: ,,, | Performed by: PHYSICIAN ASSISTANT

## 2020-09-18 PROCEDURE — 25000003 PHARM REV CODE 250: Performed by: PHYSICIAN ASSISTANT

## 2020-09-18 PROCEDURE — 25000003 PHARM REV CODE 250: Performed by: HOSPITALIST

## 2020-09-18 PROCEDURE — 93010 EKG 12-LEAD: ICD-10-PCS | Mod: ,,, | Performed by: INTERNAL MEDICINE

## 2020-09-18 PROCEDURE — 99217 PR OBSERVATION CARE DISCHARGE: ICD-10-PCS | Mod: ,,, | Performed by: PHYSICIAN ASSISTANT

## 2020-09-18 PROCEDURE — 83735 ASSAY OF MAGNESIUM: CPT

## 2020-09-18 PROCEDURE — 94761 N-INVAS EAR/PLS OXIMETRY MLT: CPT

## 2020-09-18 PROCEDURE — 93005 ELECTROCARDIOGRAM TRACING: CPT | Mod: 59

## 2020-09-18 PROCEDURE — 93272 ECG/REVIEW INTERPRET ONLY: CPT | Mod: ,,, | Performed by: INTERNAL MEDICINE

## 2020-09-18 PROCEDURE — G0378 HOSPITAL OBSERVATION PER HR: HCPCS

## 2020-09-18 PROCEDURE — 93272 CARDIAC EVENT MONITOR (CUPID ONLY): ICD-10-PCS | Mod: ,,, | Performed by: INTERNAL MEDICINE

## 2020-09-18 RX ORDER — METOPROLOL SUCCINATE 100 MG/1
100 TABLET, EXTENDED RELEASE ORAL 2 TIMES DAILY
Status: DISCONTINUED | OUTPATIENT
Start: 2020-09-18 | End: 2020-09-18 | Stop reason: HOSPADM

## 2020-09-18 RX ORDER — METOPROLOL SUCCINATE 100 MG/1
200 TABLET, EXTENDED RELEASE ORAL 2 TIMES DAILY
Status: DISCONTINUED | OUTPATIENT
Start: 2020-09-18 | End: 2020-09-18

## 2020-09-18 RX ORDER — VALSARTAN 80 MG/1
160 TABLET ORAL DAILY
Qty: 60 TABLET | Refills: 11 | Status: SHIPPED | OUTPATIENT
Start: 2020-09-18 | End: 2024-04-14

## 2020-09-18 RX ORDER — METOPROLOL SUCCINATE 50 MG/1
100 TABLET, EXTENDED RELEASE ORAL 2 TIMES DAILY
Qty: 60 TABLET | Refills: 11 | Status: SHIPPED | OUTPATIENT
Start: 2020-09-18

## 2020-09-18 RX ORDER — METOPROLOL SUCCINATE 100 MG/1
100 TABLET, EXTENDED RELEASE ORAL 2 TIMES DAILY
Status: DISCONTINUED | OUTPATIENT
Start: 2020-09-18 | End: 2020-09-18

## 2020-09-18 RX ADMIN — VALSARTAN 80 MG: 80 TABLET, FILM COATED ORAL at 12:09

## 2020-09-18 RX ADMIN — METOPROLOL SUCCINATE 100 MG: 100 TABLET, EXTENDED RELEASE ORAL at 09:09

## 2020-09-18 RX ADMIN — APIXABAN 5 MG: 5 TABLET, FILM COATED ORAL at 09:09

## 2020-09-18 NOTE — NURSING
Patient given discharge instructions with verbal understanding. Pharmacy to provide meds to bedside. \PIV and tele box removed. Wife at bedside for discharge

## 2020-09-18 NOTE — PLAN OF CARE
Problem: Fall Injury Risk  Goal: Absence of Fall and Fall-Related Injury  9/18/2020 0623 by Katelyn Tompkins RN  Outcome: Ongoing, Progressing  9/18/2020 0550 by Katelyn Tompkins RN  Outcome: Ongoing, Progressing     Problem: Adult Inpatient Plan of Care  Goal: Plan of Care Review  9/18/2020 0623 by Katelyn Tompkins RN  Outcome: Ongoing, Progressing  9/18/2020 0550 by Katelyn Tompkins RN  Outcome: Ongoing, Progressing  Goal: Patient-Specific Goal (Individualization)  9/18/2020 0623 by Katelyn Tompkins RN  Outcome: Ongoing, Progressing  9/18/2020 0550 by Katelyn Tompkins RN  Outcome: Ongoing, Progressing  Goal: Absence of Hospital-Acquired Illness or Injury  9/18/2020 0623 by Katelyn Tompkins RN  Outcome: Ongoing, Progressing  9/18/2020 0550 by Katelyn Tompkins RN  Outcome: Ongoing, Progressing  Goal: Optimal Comfort and Wellbeing  9/18/2020 0623 by Katelyn Tompkins RN  Outcome: Ongoing, Progressing  9/18/2020 0550 by Katelyn Tompkins RN  Outcome: Ongoing, Progressing  Goal: Readiness for Transition of Care  9/18/2020 0623 by Katelyn Tompkins RN  Outcome: Ongoing, Progressing  9/18/2020 0550 by Katelyn Tompkins RN  Outcome: Ongoing, Progressing  Goal: Rounds/Family Conference  9/18/2020 0623 by Katelyn Tompkins RN  Outcome: Ongoing, Progressing  9/18/2020 0550 by Katelyn Tompkins RN  Outcome: Ongoing, Progressing     Problem: Infection  Goal: Infection Symptom Resolution  9/18/2020 0623 by Katelyn Tompkins RN  Outcome: Ongoing, Progressing  9/18/2020 0550 by Katelyn Tompkins RN  Outcome: Ongoing, Progressing  Problem: Adult Inpatient Plan of Care  Goal: Rounds/Family Conference  9/18/2020 0623 by Katelyn Tompkins RN  Outcome: Ongoing, Progressing  9/18/2020 0550 by Katelyn Tompkins RN  Outcome: Ongoing, Progressing     Problem: Adult Inpatient Plan of Care  Goal: Readiness for Transition of Care  9/18/2020 0623 by Katelyn Tompkins RN  Outcome: Ongoing, Progressing  9/18/2020 0550 by Katelyn Tompkins RN  Outcome: Ongoing, Progressing     Problem: Adult Inpatient Plan of  Care  Goal: Optimal Comfort and Wellbeing  9/18/2020 0623 by Katelyn Tompkins RN  Outcome: Ongoing, Progressing  9/18/2020 0550 by Katelyn Tompkins RN  Outcome: Ongoing, Progressing     Problem: Adult Inpatient Plan of Care  Goal: Absence of Hospital-Acquired Illness or Injury  9/18/2020 0623 by Katelyn Tompkins RN  Outcome: Ongoing, Progressing  9/18/2020 0550 by Katelyn Tompkins RN  Outcome: Ongoing, Progressing     Problem: Adult Inpatient Plan of Care  Goal: Patient-Specific Goal (Individualization)  9/18/2020 0623 by Katelyn Tompkins RN  Outcome: Ongoing, Progressing  9/18/2020 0550 by Katelyn Tompkins RN  Outcome: Ongoing, Progressing     Problem: Adult Inpatient Plan of Care  Goal: Plan of Care Review  9/18/2020 0623 by Katelyn Tompkins RN  Outcome: Ongoing, Progressing  9/18/2020 0550 by Katelyn Tompkins RN  Outcome: Ongoing, Progressing   Pt alert and oriented. VSS. Pt HR remained above 60 overnight w/ one dip to 38 BPM confirmed by telemetry. Pt remains on RA, urine output adequate, independent with ADL's and ambulation. Pt goal is to d/c to home with Holter monitor today.

## 2020-09-18 NOTE — PLAN OF CARE
09/18/20 1612   Final Note   Assessment Type Final Discharge Note   Anticipated Discharge Disposition Home   Hospital Follow Up  Appt(s) scheduled?   (EP clinic to call patient to schedule hospital follow up)   Discharge plans and expectations educations in teach back method with documentation complete? Yes   Pt to d/c with family. Inbox message sent to EP clinic to schedule follow up with patient.    Future Appointments   Date Time Provider Department Center   9/24/2020  2:40 PM HOLTER, EKG [facility]   10/15/2020 10:00 AM [facility] [address] Julie Haase RN  Case Management [phone]

## 2020-09-18 NOTE — PLAN OF CARE
Monitor clinic will came place 30 day event monitor on patient prior to dc.    Julie Haase RN  Case Management [phone]

## 2020-09-18 NOTE — PLAN OF CARE
"Notified by primary that patient continues to experience symptoms. He was reported to have low HR on palpation and BP cuff readings. Last ekg showing frequent PVCs. Pt has just recently been increased to metoprolol succinate 100 mg po bid. I would recommend continue that dose at this time as it will take time to have its full effect. I had extensive conversation about reasons for "low reported HR" with patient likely being from PVCs. Pt extensively reassured and encouraged to follow plan of action. EP will follow up in clinic.     *Please do not decrease BB for concern of bradycardia. Patient's device has been interrogated multiple times and is functioning appropriately. BP cuff likely reporting low HR since it is not registering his PVCs. We expect PVC burden to diminish once the increased dose of metoprolol take full effect.     Pt okay to discharge from EP standpoint.    Case discussed with staff, Dr. Bry Monteiro who agrees with management and plan.    Ivania Clemente MD  Cardiovascular Disease Fellow PGY IV  Pager [phone]    "

## 2020-09-18 NOTE — PROCEDURES
Device Programming Note - change made at 10:20 am on 9/17/20    Type of Device: Medtronic DC PPM    Previous settings :  Mode: DDDR at base rate of 60 bpm    Changes made:  Mode: DDDR at base rate of 70 bpm      Ivania Clemente MD  Cardiovascular Disease Fellow PGY IV  Pager [phone]

## 2020-09-18 NOTE — ASSESSMENT & PLAN NOTE
"Hx AV block, complete  -Patient with AFib/complete heart block has a dual-chamber having episodes of bradycardia, no abnormalities noted on interrogation per cardiology  -Monitor on telemetry, hold B-blocker  -will likely need Holter monitor on discharge per cardiology  - consult EP:   Pt noted to have significant PVC burden, which was the likely cause of his symptoms.  Device interrogated multiple times including EP staff, and it is functioning properly.   Recommend increasing metoprolol succinate to 100 mg po bid  Reduce Valsartan to 80 mg po daily, as it may be contributing to light headedness  If further BP control is needed, please consider norvasc or nifedipine.  F/u in EP clinic  Per EP note: Notified by primary that patient continues to experience symptoms. He was reported to have low HR on palpation and BP cuff readings. Last ekg showing frequent PVCs. Pt has just recently been increased to metoprolol succinate 100 mg po bid. I would recommend continue that dose at this time as it will take time to have its full effect. I had extensive conversation about reasons for "low reported HR" with patient likely being from PVCs. Pt extensively reassured and encouraged to follow plan of action. EP will follow up in clinic.   *Please do not decrease BB for concern of bradycardia. Patient's device has been interrogated multiple times and is functioning appropriately. BP cuff likely reporting low HR since it is not registering his PVCs. We expect PVC burden to diminish once the increased dose of metoprolol take full effect.   - f/u in 4 weeks  "

## 2020-09-18 NOTE — NURSING
"Patient called nurse, patient states, "I just don't feel right". PA also at bedside.   VS taken HR 45 O2 sats98% on RA. EKG and Troponin ordered. Call light in reach, patient educated to call for assistance to get up.  "

## 2020-09-18 NOTE — DISCHARGE SUMMARY
[facility] Hospital Medicine  Discharge Summary      Patient Name: Aldo Doll  MRN: [id number]  Admission Date: 9/16/2020  Hospital Length of Stay: 0 days  Discharge Date and Time: 9/18/2020  6:04 PM  Attending Physician:  Hakan Treviño MD  Discharging Provider: Racheal Nielson PA-C  Primary Care Provider: Anthony Denton MD  [facility] Medicine Team: [facility] HOSP MED Y Racheal Nielson PA-C    HPI:   78 yo M Afib (currently on Eliquis and metoprolol), Cardiomyopathy, HFpEF (EF 50-55%), complete AV block s/p pacemaker presents for lightheadedness and noted low heart rate x1 day. The patient states that he has been having lightheadedness since he woke up yesterday. He has been checking his own pulse and noted that it gets as low as the 40's when he feels lightheaded. Today, he also reported some R sided chest pain with radiation down his R arm that occurred twice over the last 24 hrs at the same time as the lightheadedness. The patient has had similar symptoms on and off for the last few weeks, and he has presented to the ED two other times. On 9/6, he was admitted to [facility] and his pacemaker was interrogated and reported to be working correctly and pt. Was discharged. He was instructed to take his metoprolol succinate 100 mg PRN rather than daily for palpitations. On 9/9, the patient presented to the ED here for bradycardai after he took his metoprolol for reported palpitations with HR in the 90's. After taking the metoprolol, his rate dropped again to the 40's and he developed some lightheadedness and chest pain. He was instructed to stop his metoprolol all together at that time. On 9/14, he followed up with cardiology and instructed to restart his metoprolol succinate at 50 mg BID. Pt. Developed current symptoms shortly after restarting the metoprolol.    * No surgery found *      Hospital Course:   Patient with PPM admitted for lightheadedness suspected secondary to bradycardia/ PVCs.  "VSS. EKG appeared paced. Trop WNL. Cardiology consulted in ED, PPM interrogated multiple times, functioning properly. Significant PVC burden, bradycardia is likely erroneous in setting of PVCs. Recommended MTP XL 100mg PO BID and reducing Valsartan to 80mg PO daily. If further BP control is needed, consider norvasc or nifedipine. Patient with repeat episode prior to discharge, discussed with EP who stated patients symptoms due to PVCs and beta-blocker must be maxed to eliminate PVCs.      Consults:   Consults (From admission, onward)        Status Ordering Provider     Inpatient consult to Cardiology  Once     Provider:  (Not yet assigned)    Completed DEVIN FAITH     Inpatient consult to Electrophysiology  Once     Provider:  (Not yet assigned)    Completed SILVIO ROSENTHAL          * PVC's (premature ventricular contractions)  Hx AV block, complete  -Patient with AFib/complete heart block has a dual-chamber having episodes of bradycardia, no abnormalities noted on interrogation per cardiology  -Monitor on telemetry, hold B-blocker  -will likely need Holter monitor on discharge per cardiology  - consult EP:   Pt noted to have significant PVC burden, which was the likely cause of his symptoms.  Device interrogated multiple times including EP staff, and it is functioning properly.   Recommend increasing metoprolol succinate to 100 mg po bid  Reduce Valsartan to 80 mg po daily, as it may be contributing to light headedness  If further BP control is needed, please consider norvasc or nifedipine.  F/u in EP clinic  Per EP note: Notified by primary that patient continues to experience symptoms. He was reported to have low HR on palpation and BP cuff readings. Last ekg showing frequent PVCs. Pt has just recently been increased to metoprolol succinate 100 mg po bid. I would recommend continue that dose at this time as it will take time to have its full effect. I had extensive conversation about reasons for "low " "reported HR" with patient likely being from PVCs. Pt extensively reassured and encouraged to follow plan of action. EP will follow up in clinic.   *Please do not decrease BB for concern of bradycardia. Patient's device has been interrogated multiple times and is functioning appropriately. BP cuff likely reporting low HR since it is not registering his PVCs. We expect PVC burden to diminish once the increased dose of metoprolol take full effect.   - f/u in 4 weeks    Final Active Diagnoses:    Diagnosis Date Noted POA    PRINCIPAL PROBLEM:  PVC's (premature ventricular contractions) [I49.3] 10/18/2012 Yes    Bradycardia [R00.1] 09/07/2020 Yes    Essential hypertension [I10] 05/22/2018 Yes    Current use of long term anticoagulation [Z79.01] 10/06/2014 Not Applicable    Atrial fibrillation [I48.91] 06/27/2012 Yes      Problems Resolved During this Admission:    Diagnosis Date Noted Date Resolved POA    PVC's (premature ventricular contractions) [I49.3] 09/16/2020 09/18/2020 Yes       Discharged Condition: stable    Disposition: Home or Self Care    Follow Up:  Follow-up Information     Schedule an appointment as soon as possible for a visit with Seth Monteiro MD.    Specialties: Electrophysiology, Cardiology  Why: in one to two weeks. Dr. Monteiro's office knows you are being d/c and should call you to schedule a follow up. If they have not called by Wed, 9/23, please call the office to schedule.   Contact information:  [address] [facility] [address]  [phone]                 Patient Instructions:      Ambulatory referral/consult to Electrophysiology   Standing Status: Future   Referral Priority: Urgent Referral Type: Consultation   Referral Reason: Specialty Services Required   Referred to Provider: SETH MONTEIRO Requested Specialty: Electrophysiology   Number of Visits Requested: 1     Diet Cardiac     Notify your health care provider if you experience any of the following:  severe uncontrolled " pain     Notify your health care provider if you experience any of the following:  persistent dizziness, light-headedness, or visual disturbances     Cardiac event monitor   Standing Status: Future Number of Occurrences: 1 Standing Exp. Date: 09/17/21     Order Specific Question Answer Comments   Cardiac Event Monitor Auto Trigger      Activity as tolerated       Significant Diagnostic Studies: Labs: All labs within the past 24 hours have been reviewed    Pending Diagnostic Studies:     None         Medications:  Reconciled Home Medications:      Medication List      CHANGE how you take these medications    metoprolol succinate 50 MG 24 hr tablet  Commonly known as: TOPROL-XL  Take 2 tablets (100 mg total) by mouth 2 (two) times daily.  What changed: how much to take     valsartan 80 MG tablet  Commonly known as: DIOVAN  Take 2 tablets (160 mg total) by mouth once daily.  What changed: medication strength        CONTINUE taking these medications    ELIQUIS 5 mg Tab  Generic drug: apixaban  Take 1 tablet by mouth twice daily     fish oil-omega-3 fatty acids 300-1,000 mg capsule  Take 2 capsules by mouth once daily.            Indwelling Lines/Drains at time of discharge:   Lines/Drains/Airways     None                 Time spent on the discharge of patient: 30 minutes  Patient was seen and examined on the date of discharge and determined to be suitable for discharge.         Racheal Nielson PA-C  Department of Hospital Medicine  [facility]

## 2020-09-21 ENCOUNTER — TELEPHONE (OUTPATIENT)
Dept: CARDIOLOGY | Facility: HOSPITAL | Age: 80
End: 2020-09-21

## 2020-09-21 NOTE — TELEPHONE ENCOUNTER
Called patient back with instructions per Dr. Monteiro:    Toprol  mg BID  Holter Monitor on Thursday. D/C event monitor when placing holter monitor.     Patient agrees and verbalized understanding. Advised pt to call back to clinic if symptoms worsen or persist.

## 2020-09-21 NOTE — TELEPHONE ENCOUNTER
"Called patient back to discuss concerns regarding low pulse and dizziness.     Patient has dual chamber PPM- DDDR, lower rate 70. Also wearing event monitor since 9/18/20, and has several patient activated recordings for symptom "dizziness."    Patient called to clinic this AM to report dizziness and uses Pulse Ox on finger and heart rate "40s." Reviewed tracings, and heart rates WNL.     Explained to patient that pulse ox/bp cuff may not be accurate if pt having frequent PVCs. Patient concerned and states he is still having frequent episodes of dizziness daily.     Reviewed medications:  Diovan 80 mg BID  Toprol  mg in AM and 50 mg in PM.     Patient states  - 140s.     Advised patient would discuss symptoms with MD and call back with follow up instructions. Also advised patient to send a patient activated transmission on event monitor when notices pulse "40s" so that we can confirm if rate is accurate, as I suspect it is not accurate. Patient agrees and verbalized understanding.       "

## 2020-10-15 ENCOUNTER — CLINICAL SUPPORT (OUTPATIENT)
Dept: CARDIOLOGY | Facility: HOSPITAL | Age: 80
End: 2020-10-15

## 2021-04-28 ENCOUNTER — PATIENT MESSAGE (OUTPATIENT)
Dept: RESEARCH | Facility: HOSPITAL | Age: 81
End: 2021-04-28

## 2021-07-01 ENCOUNTER — PATIENT MESSAGE (OUTPATIENT)
Dept: ADMINISTRATIVE | Facility: OTHER | Age: 81
End: 2021-07-01

## 2021-07-30 ENCOUNTER — TELEPHONE (OUTPATIENT)
Dept: FAMILY MEDICINE | Facility: CLINIC | Age: 81
End: 2021-07-30

## 2021-08-04 ENCOUNTER — CLINICAL SUPPORT (OUTPATIENT)
Dept: URGENT CARE | Facility: CLINIC | Age: 81
End: 2021-08-04
Payer: MEDICARE

## 2021-08-04 DIAGNOSIS — Z20.822 ENCOUNTER FOR LABORATORY TESTING FOR COVID-19 VIRUS: ICD-10-CM

## 2021-08-04 PROCEDURE — 99000 PR SPECIMEN HANDLING,DR OFF->LAB: ICD-10-PCS | Mod: S$GLB,,, | Performed by: INTERNAL MEDICINE

## 2021-08-04 PROCEDURE — U0003 INFECTIOUS AGENT DETECTION BY NUCLEIC ACID (DNA OR RNA); SEVERE ACUTE RESPIRATORY SYNDROME CORONAVIRUS 2 (SARS-COV-2) (CORONAVIRUS DISEASE [COVID-19]), AMPLIFIED PROBE TECHNIQUE, MAKING USE OF HIGH THROUGHPUT TECHNOLOGIES AS DESCRIBED BY CMS-2020-01-R: HCPCS | Performed by: PHYSICIAN ASSISTANT

## 2021-08-04 PROCEDURE — U0005 INFEC AGEN DETEC AMPLI PROBE: HCPCS | Performed by: PHYSICIAN ASSISTANT

## 2021-08-04 PROCEDURE — 99000 SPECIMEN HANDLING OFFICE-LAB: CPT | Mod: S$GLB,,, | Performed by: INTERNAL MEDICINE

## 2021-08-05 LAB — SARS-COV-2 RNA RESP QL NAA+PROBE: NOT DETECTED

## 2021-08-06 ENCOUNTER — TELEPHONE (OUTPATIENT)
Dept: URGENT CARE | Facility: CLINIC | Age: 81
End: 2021-08-06

## 2021-10-04 ENCOUNTER — PATIENT MESSAGE (OUTPATIENT)
Dept: ADMINISTRATIVE | Facility: HOSPITAL | Age: 81
End: 2021-10-04

## 2021-11-08 ENCOUNTER — TELEPHONE (OUTPATIENT)
Dept: FAMILY MEDICINE | Facility: CLINIC | Age: 81
End: 2021-11-08
Payer: MEDICARE

## 2022-03-10 ENCOUNTER — TELEPHONE (OUTPATIENT)
Dept: FAMILY MEDICINE | Facility: CLINIC | Age: 82
End: 2022-03-10
Payer: MEDICARE

## 2022-03-10 NOTE — TELEPHONE ENCOUNTER
----- Message from Ana Mccollum MA sent at 3/10/2022 12:56 PM CST -----  Regarding: FW: self [phone]  Please Advise   ----- Message -----  From: Sultana Zambrano  Sent: 3/10/2022  12:55 PM CST  To: Bertin Cruz Staff  Subject: self [phone]                                Type:  Patient Returning Call    Who Called:  self    Who Left Message for Patient:  Amanda    Does the patient know what this is regarding?: Patient stated he missed her call regarding PPW.    Would the patient rather a call back or a response via [facility]?  Call back    Best Call Back Number: [phone]

## 2022-03-18 ENCOUNTER — LAB VISIT (OUTPATIENT)
Dept: LAB | Facility: HOSPITAL | Age: 82
End: 2022-03-18
Attending: INTERNAL MEDICINE
Payer: MEDICARE

## 2022-03-18 ENCOUNTER — OFFICE VISIT (OUTPATIENT)
Dept: FAMILY MEDICINE | Facility: CLINIC | Age: 82
End: 2022-03-18
Payer: MEDICARE

## 2022-03-18 VITALS
HEIGHT: 72 IN | OXYGEN SATURATION: 97 % | HEART RATE: 70 BPM | SYSTOLIC BLOOD PRESSURE: 106 MMHG | TEMPERATURE: 98 F | WEIGHT: 252 LBS | BODY MASS INDEX: 34.13 KG/M2 | DIASTOLIC BLOOD PRESSURE: 62 MMHG

## 2022-03-18 DIAGNOSIS — I10 HYPERTENSION, ESSENTIAL: ICD-10-CM

## 2022-03-18 DIAGNOSIS — I48.0 PAROXYSMAL ATRIAL FIBRILLATION: ICD-10-CM

## 2022-03-18 DIAGNOSIS — I48.0 PAROXYSMAL ATRIAL FIBRILLATION: Primary | ICD-10-CM

## 2022-03-18 DIAGNOSIS — M16.0 PRIMARY OSTEOARTHRITIS OF BOTH HIPS: ICD-10-CM

## 2022-03-18 DIAGNOSIS — Z01.818 PRE-OPERATIVE EXAMINATION FOR INTERNAL MEDICINE: ICD-10-CM

## 2022-03-18 LAB
ALBUMIN SERPL BCP-MCNC: 4.2 G/DL (ref 3.5–5.2)
ALP SERPL-CCNC: 60 U/L (ref 55–135)
ALT SERPL W/O P-5'-P-CCNC: 50 U/L (ref 10–44)
ANION GAP SERPL CALC-SCNC: 7 MMOL/L (ref 8–16)
APTT BLDCRRT: 29.4 SEC (ref 21–32)
AST SERPL-CCNC: 30 U/L (ref 10–40)
BASOPHILS # BLD AUTO: 0.05 K/UL (ref 0–0.2)
BASOPHILS NFR BLD: 0.8 % (ref 0–1.9)
BILIRUB SERPL-MCNC: 1 MG/DL (ref 0.1–1)
BUN SERPL-MCNC: 21 MG/DL (ref 8–23)
CALCIUM SERPL-MCNC: 10.2 MG/DL (ref 8.7–10.5)
CHLORIDE SERPL-SCNC: 105 MMOL/L (ref 95–110)
CO2 SERPL-SCNC: 28 MMOL/L (ref 23–29)
CREAT SERPL-MCNC: 1.2 MG/DL (ref 0.5–1.4)
DIFFERENTIAL METHOD: ABNORMAL
EOSINOPHIL # BLD AUTO: 0.5 K/UL (ref 0–0.5)
EOSINOPHIL NFR BLD: 7.4 % (ref 0–8)
ERYTHROCYTE [DISTWIDTH] IN BLOOD BY AUTOMATED COUNT: 13.5 % (ref 11.5–14.5)
EST. GFR  (AFRICAN AMERICAN): >60 ML/MIN/1.73 M^2
EST. GFR  (NON AFRICAN AMERICAN): 56 ML/MIN/1.73 M^2
GLUCOSE SERPL-MCNC: 108 MG/DL (ref 70–110)
HCT VFR BLD AUTO: 36.9 % (ref 40–54)
HGB BLD-MCNC: 12.8 G/DL (ref 14–18)
IMM GRANULOCYTES # BLD AUTO: 0.04 K/UL (ref 0–0.04)
IMM GRANULOCYTES NFR BLD AUTO: 0.6 % (ref 0–0.5)
INR PPP: 1 (ref 0.8–1.2)
LYMPHOCYTES # BLD AUTO: 1.8 K/UL (ref 1–4.8)
LYMPHOCYTES NFR BLD: 27.5 % (ref 18–48)
MCH RBC QN AUTO: 32.1 PG (ref 27–31)
MCHC RBC AUTO-ENTMCNC: 34.7 G/DL (ref 32–36)
MCV RBC AUTO: 93 FL (ref 82–98)
MONOCYTES # BLD AUTO: 0.7 K/UL (ref 0.3–1)
MONOCYTES NFR BLD: 10.3 % (ref 4–15)
NEUTROPHILS # BLD AUTO: 3.5 K/UL (ref 1.8–7.7)
NEUTROPHILS NFR BLD: 53.4 % (ref 38–73)
NRBC BLD-RTO: 0 /100 WBC
PLATELET # BLD AUTO: 141 K/UL (ref 150–450)
PMV BLD AUTO: 8.9 FL (ref 9.2–12.9)
POTASSIUM SERPL-SCNC: 5 MMOL/L (ref 3.5–5.1)
PROT SERPL-MCNC: 7.8 G/DL (ref 6–8.4)
PROTHROMBIN TIME: 10.9 SEC (ref 9–12.5)
RBC # BLD AUTO: 3.99 M/UL (ref 4.6–6.2)
SODIUM SERPL-SCNC: 140 MMOL/L (ref 136–145)
WBC # BLD AUTO: 6.51 K/UL (ref 3.9–12.7)

## 2022-03-18 PROCEDURE — 1126F PR PAIN SEVERITY QUANTIFIED, NO PAIN PRESENT: ICD-10-PCS | Mod: CPTII,S$GLB,, | Performed by: INTERNAL MEDICINE

## 2022-03-18 PROCEDURE — 93010 ELECTROCARDIOGRAM REPORT: CPT | Mod: S$GLB,,, | Performed by: INTERNAL MEDICINE

## 2022-03-18 PROCEDURE — 93010 EKG 12-LEAD: ICD-10-PCS | Mod: S$GLB,,, | Performed by: INTERNAL MEDICINE

## 2022-03-18 PROCEDURE — 1160F RVW MEDS BY RX/DR IN RCRD: CPT | Mod: CPTII,S$GLB,, | Performed by: INTERNAL MEDICINE

## 2022-03-18 PROCEDURE — 99999 PR PBB SHADOW E&M-EST. PATIENT-LVL IV: ICD-10-PCS | Mod: PBBFAC,,, | Performed by: INTERNAL MEDICINE

## 2022-03-18 PROCEDURE — 1126F AMNT PAIN NOTED NONE PRSNT: CPT | Mod: CPTII,S$GLB,, | Performed by: INTERNAL MEDICINE

## 2022-03-18 PROCEDURE — 1160F PR REVIEW ALL MEDS BY PRESCRIBER/CLIN PHARMACIST DOCUMENTED: ICD-10-PCS | Mod: CPTII,S$GLB,, | Performed by: INTERNAL MEDICINE

## 2022-03-18 PROCEDURE — 3074F SYST BP LT 130 MM HG: CPT | Mod: CPTII,S$GLB,, | Performed by: INTERNAL MEDICINE

## 2022-03-18 PROCEDURE — 99214 PR OFFICE/OUTPT VISIT, EST, LEVL IV, 30-39 MIN: ICD-10-PCS | Mod: S$GLB,,, | Performed by: INTERNAL MEDICINE

## 2022-03-18 PROCEDURE — 85610 PROTHROMBIN TIME: CPT | Performed by: INTERNAL MEDICINE

## 2022-03-18 PROCEDURE — 1101F PR PT FALLS ASSESS DOC 0-1 FALLS W/OUT INJ PAST YR: ICD-10-PCS | Mod: CPTII,S$GLB,, | Performed by: INTERNAL MEDICINE

## 2022-03-18 PROCEDURE — 3288F PR FALLS RISK ASSESSMENT DOCUMENTED: ICD-10-PCS | Mod: CPTII,S$GLB,, | Performed by: INTERNAL MEDICINE

## 2022-03-18 PROCEDURE — 36415 COLL VENOUS BLD VENIPUNCTURE: CPT | Mod: PN | Performed by: INTERNAL MEDICINE

## 2022-03-18 PROCEDURE — 99999 PR PBB SHADOW E&M-EST. PATIENT-LVL IV: CPT | Mod: PBBFAC,,, | Performed by: INTERNAL MEDICINE

## 2022-03-18 PROCEDURE — 1159F PR MEDICATION LIST DOCUMENTED IN MEDICAL RECORD: ICD-10-PCS | Mod: CPTII,S$GLB,, | Performed by: INTERNAL MEDICINE

## 2022-03-18 PROCEDURE — 3078F PR MOST RECENT DIASTOLIC BLOOD PRESSURE < 80 MM HG: ICD-10-PCS | Mod: CPTII,S$GLB,, | Performed by: INTERNAL MEDICINE

## 2022-03-18 PROCEDURE — 99214 OFFICE O/P EST MOD 30 MIN: CPT | Mod: S$GLB,,, | Performed by: INTERNAL MEDICINE

## 2022-03-18 PROCEDURE — 85730 THROMBOPLASTIN TIME PARTIAL: CPT | Performed by: INTERNAL MEDICINE

## 2022-03-18 PROCEDURE — 3288F FALL RISK ASSESSMENT DOCD: CPT | Mod: CPTII,S$GLB,, | Performed by: INTERNAL MEDICINE

## 2022-03-18 PROCEDURE — 80053 COMPREHEN METABOLIC PANEL: CPT | Performed by: INTERNAL MEDICINE

## 2022-03-18 PROCEDURE — 93005 ELECTROCARDIOGRAM TRACING: CPT | Mod: S$GLB,,, | Performed by: INTERNAL MEDICINE

## 2022-03-18 PROCEDURE — 1159F MED LIST DOCD IN RCRD: CPT | Mod: CPTII,S$GLB,, | Performed by: INTERNAL MEDICINE

## 2022-03-18 PROCEDURE — 85025 COMPLETE CBC W/AUTO DIFF WBC: CPT | Performed by: INTERNAL MEDICINE

## 2022-03-18 PROCEDURE — 3074F PR MOST RECENT SYSTOLIC BLOOD PRESSURE < 130 MM HG: ICD-10-PCS | Mod: CPTII,S$GLB,, | Performed by: INTERNAL MEDICINE

## 2022-03-18 PROCEDURE — 3078F DIAST BP <80 MM HG: CPT | Mod: CPTII,S$GLB,, | Performed by: INTERNAL MEDICINE

## 2022-03-18 PROCEDURE — 1101F PT FALLS ASSESS-DOCD LE1/YR: CPT | Mod: CPTII,S$GLB,, | Performed by: INTERNAL MEDICINE

## 2022-03-18 PROCEDURE — 93005 EKG 12-LEAD: ICD-10-PCS | Mod: S$GLB,,, | Performed by: INTERNAL MEDICINE

## 2022-03-18 RX ORDER — AMIODARONE HYDROCHLORIDE 200 MG/1
100 TABLET ORAL DAILY
COMMUNITY
Start: 2022-02-24

## 2022-03-18 NOTE — PROGRESS NOTES
Subjective:       Patient ID: Aldo Doll is a 81 y.o. male.    Chief Complaint: Pre-op Exam (Hip surgery, patient drop off paperwork)    Pre operative exam    HPI: 82 y/o with PAF NICM s/p CRT presents for medical clearance for left hip arthoplasty with Dr. Skyler Ricks (DOS: TBD). He feels well other then chronic left hip pain. Pain has been limiting his ability to walk and maintain physically active lifestyle. He denies any orthostatic symptoms no palpitations no orthopnea or PND no LE swelling. He tells me his treating cardiologist recently gave him a non invasive stress test (results not available in our system). He is taking apixiban twice daily for stroke prophylaxis for afib and continues on daily amiodorone for rhythm control.     Review of Systems   Constitutional: Negative for activity change, appetite change, fatigue, fever and unexpected weight change.   HENT: Negative for ear pain, rhinorrhea and sore throat.    Eyes: Negative for discharge and visual disturbance.   Respiratory: Negative for chest tightness, shortness of breath and wheezing.    Cardiovascular: Negative for chest pain, palpitations and leg swelling.   Gastrointestinal: Negative for abdominal pain, constipation and diarrhea.   Endocrine: Negative for cold intolerance and heat intolerance.   Genitourinary: Negative for dysuria and hematuria.   Musculoskeletal: Negative for joint swelling and neck stiffness.   Skin: Negative for rash.   Neurological: Negative for dizziness, syncope, weakness and headaches.   Psychiatric/Behavioral: Negative for suicidal ideas.       Objective:     Vitals:    03/18/22 1337   BP: 106/62   BP Location: Left arm   Patient Position: Sitting   BP Method: Large (Manual)   Pulse: 70   Temp: 97.9 °F (36.6 °C)   TempSrc: Oral   SpO2: 97%   Weight: 114.3 kg (251 lb 15.8 oz)   Height: 6' (1.829 m)          Physical Exam  Constitutional:       Appearance: He is well-developed.   HENT:      Head: Normocephalic and  atraumatic.   Eyes:      General: No scleral icterus.     Conjunctiva/sclera: Conjunctivae normal.   Cardiovascular:      Rate and Rhythm: Normal rate and regular rhythm.      Heart sounds: No murmur heard.    No friction rub. No gallop.      Comments: Rate in 70's and regular  Pulmonary:      Effort: Pulmonary effort is normal.      Breath sounds: Normal breath sounds. No wheezing or rales.   Abdominal:      General: There is no distension.      Palpations: Abdomen is soft.      Tenderness: There is no abdominal tenderness. There is no guarding or rebound.   Musculoskeletal:         General: No tenderness. Normal range of motion.      Cervical back: Normal range of motion.      Right lower leg: No edema.      Left lower leg: No edema.   Skin:     General: Skin is warm and dry.   Neurological:      Mental Status: He is alert and oriented to person, place, and time.      Cranial Nerves: No cranial nerve deficit.         Assessment and Plan   1. Paroxysmal atrial fibrillation  Rhythm controlled on noac. His apixiban can be held three days prior to surgery  - CBC Auto Differential; Future  - Comprehensive Metabolic Panel; Future  - Protime-INR; Future  - APTT; Future  - EKG 12-lead    2. Hypertension, essential  At goal continue current medications  - Comprehensive Metabolic Panel; Future    3. Primary osteoarthritis of both hips  Following with Dr. Ricks of [facility]    4. Pre-operative examination for internal medicine  Medically optimized needs ischemic testing given inability to meet 4METS limited by hip pain. He reportedly has had recent cardiac testign with his outside cardiologist, Dr. Zaidi, will coordinate this information/clearance to Dr. Ricks  - X-Ray Chest PA And Lateral; Future  - Urinalysis; Future

## 2022-03-22 ENCOUNTER — TELEPHONE (OUTPATIENT)
Dept: FAMILY MEDICINE | Facility: CLINIC | Age: 82
End: 2022-03-22
Payer: MEDICARE

## 2022-03-22 NOTE — TELEPHONE ENCOUNTER
Pre operative packet with letter drafted to dr correa needs cardiology clearance as well from Dr. Zaidi

## 2022-08-30 ENCOUNTER — TELEPHONE (OUTPATIENT)
Dept: FAMILY MEDICINE | Facility: CLINIC | Age: 82
End: 2022-08-30
Payer: MEDICARE

## 2022-08-30 NOTE — TELEPHONE ENCOUNTER
----- Message from Sultana Zambrano sent at 8/30/2022  8:32 AM CDT -----  Regarding: self [phone]  Type:  Sooner Appointment Request    Patient is requesting a sooner appointment.  Patient declined first available appointment listed as well as another facility and provider .  Patient will not accept being placed on the waitlist and is requesting a message be sent to doctor.    Name of Caller: self    When is the first available appointment? 9/8    Symptoms: pt went to [facility] and was told to see the office and he doesn't recall the date of discharge    Would the patient rather a call back or a response via [facility]? Call back    Best Call Back Number:  [phone]

## 2022-08-30 NOTE — TELEPHONE ENCOUNTER
Patient states he was seen in the ER two days for swelling in right leg, and was told he may have a blockage and should see PCP asap. Patient is scheduled for 9/8 but has to be seen ASAP. Please advise further.

## 2022-08-30 NOTE — TELEPHONE ENCOUNTER
Spoke with the patient and he stated he has been on and off in the hospital for his leg. Patient wanted to see PCP for hospital follow up. I offered the patient an appointment with Dulce Maria. Patient refused. Patient is scheduled on 9/8/2022 with Dr. Denton. Patient is currently on wait list for cancellation.

## 2022-08-30 NOTE — TELEPHONE ENCOUNTER
Called patient and stated that Dr. Denton recommend that he needs to reach out to his Cardiologist because of the recent ER visit and swelling in leg. Patient declined. I offer again to schedule appointment with NP or at a different location. Patient refused. Patient stated that he will go to ER for evaluation.

## 2022-09-08 ENCOUNTER — OFFICE VISIT (OUTPATIENT)
Dept: FAMILY MEDICINE | Facility: CLINIC | Age: 82
End: 2022-09-08
Payer: MEDICARE

## 2022-09-08 VITALS
SYSTOLIC BLOOD PRESSURE: 116 MMHG | OXYGEN SATURATION: 99 % | HEIGHT: 72 IN | TEMPERATURE: 98 F | HEART RATE: 69 BPM | WEIGHT: 244.69 LBS | DIASTOLIC BLOOD PRESSURE: 58 MMHG | BODY MASS INDEX: 33.14 KG/M2

## 2022-09-08 DIAGNOSIS — I48.0 PAROXYSMAL ATRIAL FIBRILLATION: ICD-10-CM

## 2022-09-08 DIAGNOSIS — L03.115 CELLULITIS OF RIGHT LOWER EXTREMITY: Primary | ICD-10-CM

## 2022-09-08 PROCEDURE — 99999 PR PBB SHADOW E&M-EST. PATIENT-LVL III: CPT | Mod: PBBFAC,,, | Performed by: INTERNAL MEDICINE

## 2022-09-08 PROCEDURE — 1160F PR REVIEW ALL MEDS BY PRESCRIBER/CLIN PHARMACIST DOCUMENTED: ICD-10-PCS | Mod: CPTII,S$GLB,, | Performed by: INTERNAL MEDICINE

## 2022-09-08 PROCEDURE — 1126F AMNT PAIN NOTED NONE PRSNT: CPT | Mod: CPTII,S$GLB,, | Performed by: INTERNAL MEDICINE

## 2022-09-08 PROCEDURE — 3288F PR FALLS RISK ASSESSMENT DOCUMENTED: ICD-10-PCS | Mod: CPTII,S$GLB,, | Performed by: INTERNAL MEDICINE

## 2022-09-08 PROCEDURE — 1101F PT FALLS ASSESS-DOCD LE1/YR: CPT | Mod: CPTII,S$GLB,, | Performed by: INTERNAL MEDICINE

## 2022-09-08 PROCEDURE — 3074F PR MOST RECENT SYSTOLIC BLOOD PRESSURE < 130 MM HG: ICD-10-PCS | Mod: CPTII,S$GLB,, | Performed by: INTERNAL MEDICINE

## 2022-09-08 PROCEDURE — 1160F RVW MEDS BY RX/DR IN RCRD: CPT | Mod: CPTII,S$GLB,, | Performed by: INTERNAL MEDICINE

## 2022-09-08 PROCEDURE — 1126F PR PAIN SEVERITY QUANTIFIED, NO PAIN PRESENT: ICD-10-PCS | Mod: CPTII,S$GLB,, | Performed by: INTERNAL MEDICINE

## 2022-09-08 PROCEDURE — 3078F PR MOST RECENT DIASTOLIC BLOOD PRESSURE < 80 MM HG: ICD-10-PCS | Mod: CPTII,S$GLB,, | Performed by: INTERNAL MEDICINE

## 2022-09-08 PROCEDURE — 99213 PR OFFICE/OUTPT VISIT, EST, LEVL III, 20-29 MIN: ICD-10-PCS | Mod: S$GLB,,, | Performed by: INTERNAL MEDICINE

## 2022-09-08 PROCEDURE — 3074F SYST BP LT 130 MM HG: CPT | Mod: CPTII,S$GLB,, | Performed by: INTERNAL MEDICINE

## 2022-09-08 PROCEDURE — 99999 PR PBB SHADOW E&M-EST. PATIENT-LVL III: ICD-10-PCS | Mod: PBBFAC,,, | Performed by: INTERNAL MEDICINE

## 2022-09-08 PROCEDURE — 1159F PR MEDICATION LIST DOCUMENTED IN MEDICAL RECORD: ICD-10-PCS | Mod: CPTII,S$GLB,, | Performed by: INTERNAL MEDICINE

## 2022-09-08 PROCEDURE — 1159F MED LIST DOCD IN RCRD: CPT | Mod: CPTII,S$GLB,, | Performed by: INTERNAL MEDICINE

## 2022-09-08 PROCEDURE — 1101F PR PT FALLS ASSESS DOC 0-1 FALLS W/OUT INJ PAST YR: ICD-10-PCS | Mod: CPTII,S$GLB,, | Performed by: INTERNAL MEDICINE

## 2022-09-08 PROCEDURE — 3288F FALL RISK ASSESSMENT DOCD: CPT | Mod: CPTII,S$GLB,, | Performed by: INTERNAL MEDICINE

## 2022-09-08 PROCEDURE — 99213 OFFICE O/P EST LOW 20 MIN: CPT | Mod: S$GLB,,, | Performed by: INTERNAL MEDICINE

## 2022-09-08 PROCEDURE — 3078F DIAST BP <80 MM HG: CPT | Mod: CPTII,S$GLB,, | Performed by: INTERNAL MEDICINE

## 2022-09-08 NOTE — PROGRESS NOTES
Subjective:       Patient ID: Aldo Doll is a 81 y.o. male.    Chief Complaint: Hospital Follow Up    F/u from ED for right leg cut    HPI: 80 y/o w/ afib presents alone for ED follow up. 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 Seen in ED following week due to increasing swelling and pain. Prescribed antibiotics has completed these. Swelling markedly improved no longer painful to ambulate no drainage.     Review of Systems   Constitutional:  Negative for activity change, appetite change, fatigue, fever and unexpected weight change.   HENT:  Negative for ear pain, rhinorrhea and sore throat.    Eyes:  Negative for discharge and visual disturbance.   Respiratory:  Negative for chest tightness, shortness of breath and wheezing.    Cardiovascular:  Negative for chest pain, palpitations and leg swelling.   Gastrointestinal:  Negative for abdominal pain, constipation and diarrhea.   Endocrine: Negative for cold intolerance and heat intolerance.   Genitourinary:  Negative for dysuria and hematuria.   Musculoskeletal:  Negative for joint swelling and neck stiffness.   Skin:  Negative for rash.   Neurological:  Negative for dizziness, syncope, weakness and headaches.   Psychiatric/Behavioral:  Negative for suicidal ideas.      Objective:     Vitals:    09/08/22 1318   BP: (!) 116/58   BP Location: Right arm   Patient Position: Sitting   BP Method: Large (Manual)   Pulse: 69   Temp: 97.7 °F (36.5 °C)   TempSrc: Oral   SpO2: 99%   Weight: 111 kg (244 lb 11.4 oz)   Height: 6' (1.829 m)          Physical Exam  Constitutional:       Appearance: He is well-developed.   HENT:      Head: Normocephalic and atraumatic.   Eyes:      Conjunctiva/sclera: Conjunctivae normal.   Cardiovascular:      Rate and Rhythm: Normal rate and regular rhythm.      Heart sounds: No murmur heard.    No friction rub. No gallop.   Pulmonary:      Effort: Pulmonary effort is normal. No respiratory distress.       Breath sounds: Normal breath sounds. No wheezing or rales.   Musculoskeletal:         General: No tenderness. Normal range of motion.      Cervical back: Normal range of motion.      Right lower leg: No edema.      Left lower leg: No edema.   Skin:     General: Skin is warm and dry.      Comments: See photo no periwound induration, good pink granulation centrally no discharge no tenderness   Neurological:      Mental Status: He is alert and oriented to person, place, and time.      Cranial Nerves: No cranial nerve deficit.         Assessment and Plan   1. Cellulitis of right lower extremity  Resolved discuss using covering when working on boat no indication for further antibiotics    2. Paroxysmal atrial fibrillation  Rate controlled continue management per cardiologist

## 2022-10-19 ENCOUNTER — EXTERNAL HOME HEALTH (OUTPATIENT)
Dept: HOME HEALTH SERVICES | Facility: HOSPITAL | Age: 82
End: 2022-10-19

## 2023-01-28 ENCOUNTER — HOSPITAL ENCOUNTER (EMERGENCY)
Facility: HOSPITAL | Age: 83
Discharge: HOME | End: 2023-01-28
Attending: EMERGENCY MEDICINE
Payer: MEDICARE

## 2023-01-28 VITALS
SYSTOLIC BLOOD PRESSURE: 126 MMHG | BODY MASS INDEX: 33.18 KG/M2 | RESPIRATION RATE: 18 BRPM | DIASTOLIC BLOOD PRESSURE: 61 MMHG | HEIGHT: 72 IN | TEMPERATURE: 98 F | WEIGHT: 245 LBS | OXYGEN SATURATION: 96 % | HEART RATE: 71 BPM

## 2023-01-28 DIAGNOSIS — H92.22 BLEEDING FROM LEFT EAR: ICD-10-CM

## 2023-01-28 DIAGNOSIS — H60.12 CELLULITIS OF LEFT EAR: ICD-10-CM

## 2023-01-28 DIAGNOSIS — H92.02 ACUTE EAR PAIN, LEFT: Primary | ICD-10-CM

## 2023-01-28 PROCEDURE — 12013 RPR F/E/E/N/L/M 2.6-5.0 CM: CPT

## 2023-01-28 PROCEDURE — 99284 EMERGENCY DEPT VISIT MOD MDM: CPT

## 2023-01-28 RX ORDER — LORATADINE 10 MG/1
10 TABLET ORAL DAILY PRN
Qty: 60 TABLET | Refills: 0 | Status: SHIPPED | OUTPATIENT
Start: 2023-01-28 | End: 2024-01-25

## 2023-01-28 RX ORDER — MUPIROCIN 20 MG/G
OINTMENT TOPICAL 2 TIMES DAILY
Qty: 60 G | Refills: 0 | Status: SHIPPED | OUTPATIENT
Start: 2023-01-28 | End: 2023-02-07

## 2023-01-28 RX ORDER — ACETAMINOPHEN 500 MG
500 TABLET ORAL EVERY 6 HOURS PRN
Qty: 30 TABLET | Refills: 0 | Status: SHIPPED | OUTPATIENT
Start: 2023-01-28

## 2023-01-28 RX ORDER — HYDROXYZINE HYDROCHLORIDE 25 MG/1
25 TABLET, FILM COATED ORAL 4 TIMES DAILY PRN
Qty: 12 TABLET | Refills: 0 | Status: SHIPPED | OUTPATIENT
Start: 2023-01-28

## 2023-01-28 RX ORDER — CEPHALEXIN 500 MG/1
500 CAPSULE ORAL 4 TIMES DAILY
Qty: 20 CAPSULE | Refills: 0 | Status: SHIPPED | OUTPATIENT
Start: 2023-01-28 | End: 2023-02-04

## 2023-01-28 NOTE — ED PROVIDER NOTES
"Encounter Date: 1/28/2023    SCRIBE #1 NOTE: I, Carolina Zaragoza, am scribing for, and in the presence of,  Gill Richardson DO.     History     Chief Complaint   Patient presents with    Otalgia     Pt to the ED with reports of bleeding and pain to his left outer ear since last night. Pt reports having surgery two days ago to remove cancer from his left ear and the left side of his face. Pt reports surgeon told him to come to the ED for continued bleeding or pain. Pt reports taking eliquis. Bleeding controlled in triage.     83 yo M with PMHx of A-fib on Eliquis, CHF, HTN, HLD, presents to the ED with a chief complaint of a bleeding wound. He had an outpatient procedure done for skin cancer removal 2 days ago, sustaining wounds to his left outer ear and left cheek. Since then, he has been experiencing increasing "burning" pain with associated pruritis, and some bleeding to the wounds last PM. He was told he would be given Abx but never received the prescription. No other exacerbating or alleviating factors. Denies chest pain, dyspnea, fever, chills, nausea, vomiting, diarrhea, or other associated symptoms. He was nonadherent with his Eliquis for 4 days but states he is restarting his medication.     The history is provided by the patient.   Review of patient's allergies indicates:   Allergen Reactions    Pradaxa [dabigatran etexilate] Itching and Rash    Ace inhibitors Other (See Comments)     Other reaction(s): cough  Other reaction(s): cough    Chocolate flavor Rash     Other reaction(s): Rash     Past Medical History:   Diagnosis Date    *Atrial fibrillation     Anticoagulant long-term use     Arthritis     Atrial fibrillation     Cardiomyopathy     CHF (congestive heart failure)     Coronary artery disease     Heart block     Hyperlipidemia     Hypertension      Past Surgical History:   Procedure Laterality Date    CHOLECYSTECTOMY      HERNIA REPAIR      INSERT / REPLACE / REMOVE PACEMAKER       Family History   Problem " Relation Age of Onset    Cancer Mother         breast    Cancer Sister         breast    Mental illness Son         depression     Social History     Tobacco Use    Smoking status: Never    Smokeless tobacco: Never   Substance Use Topics    Alcohol use: Yes     Alcohol/week: 3.0 standard drinks     Types: 3 Glasses of wine per week     Comment: daily    Drug use: No     Review of Systems   Constitutional:  Negative for diaphoresis, fatigue and unexpected weight change.   HENT:  Negative for sinus pain and sore throat.    Eyes:  Negative for pain, redness and visual disturbance.   Respiratory:  Negative for cough, chest tightness, shortness of breath and wheezing.    Cardiovascular:  Negative for chest pain and palpitations.   Gastrointestinal:  Negative for abdominal pain, blood in stool, diarrhea, nausea and vomiting.   Endocrine: Negative for polydipsia, polyphagia and polyuria.   Genitourinary:  Negative for dysuria, frequency and urgency.   Musculoskeletal:  Negative for arthralgias, back pain and myalgias.   Skin:  Positive for wound (to L ear and cheek, +itching, bleeding). Negative for rash.   Allergic/Immunologic: Negative for environmental allergies.   Neurological:  Negative for dizziness, syncope and headaches.   Hematological:  Bruises/bleeds easily.   Psychiatric/Behavioral:  Negative for suicidal ideas.      Physical Exam     Initial Vitals [01/28/23 1528]   BP Pulse Resp Temp SpO2   126/61 71 18 97.5 °F (36.4 °C) 96 %      MAP       --         Patient gave consent to have physical exam performed.    Physical Exam    Nursing note and vitals reviewed.  Constitutional: He appears well-developed and well-nourished. He is not diaphoretic. No distress.   HENT:   Head: Normocephalic and atraumatic.       Right Ear: External ear normal.   Nose: Nose normal.   Mouth/Throat: Oropharynx is clear and moist.   3 cm incision to the left cheek, bleeding well controlled. 4 cm Incision to the left upper ear with  definite bleeding, and redness, concerning for infection.    Eyes: EOM are normal. Pupils are equal, round, and reactive to light.   Neck: Neck supple.   Normal range of motion.  Cardiovascular:  Normal rate, regular rhythm, normal heart sounds and intact distal pulses.           Pulmonary/Chest: Breath sounds normal. No stridor. No respiratory distress. He has no wheezes. He has no rhonchi. He has no rales.   Abdominal: Abdomen is soft. Bowel sounds are normal. He exhibits no distension. There is no abdominal tenderness. There is no rebound and no guarding.   Musculoskeletal:         General: No edema. Normal range of motion.      Cervical back: Normal range of motion and neck supple.     Neurological: He is alert. He has normal strength and normal reflexes. No cranial nerve deficit.   Skin: Skin is warm and dry. Capillary refill takes less than 2 seconds.   Psychiatric: He has a normal mood and affect.       ED Course   Lac Repair    Date/Time: 1/28/2023 3:57 PM  Performed by: Gill Richardson DO  Authorized by: Gill Richardson DO     Consent:     Consent obtained:  Verbal    Consent given by:  Patient    Risks discussed:  Infection and pain    Alternatives discussed:  Delayed treatment and no treatment  Universal protocol:     Patient identity confirmed:  Verbally with patient  Anesthesia:     Anesthesia method:  None  Laceration details:     Location:  Ear    Ear location:  L ear    Length (cm):  4  Pre-procedure details:     Preparation:  Patient was prepped and draped in usual sterile fashion  Exploration:     Wound exploration: wound explored through full range of motion      Contaminated: no    Treatment:     Area cleansed with:  Saline    Amount of cleaning:  Standard    Irrigation solution:  Sterile saline  Repair type:     Repair type:  Simple  Post-procedure details:     Dressing:  Non-adherent dressing    Procedure completion:  Tolerated well, no immediate complications  Comments:      Incision to the left  upper ear with definite bleeding, and redness, concerning for infection. SurgiSeal applied with bleeding controlled. Applied non adhesive dressing, and gauze behind the left ear. Secured dressing.   Labs Reviewed - No data to display       Imaging Results    None          Medications - No data to display              MDM  This is an evaluation of a 82 y.o. male that presents to the Emergency Department for   Chief Complaint   Patient presents with    Otalgia     Pt to the ED with reports of bleeding and pain to his left outer ear since last night. Pt reports having surgery two days ago to remove cancer from his left ear and the left side of his face. Pt reports surgeon told him to come to the ED for continued bleeding or pain. Pt reports taking eliquis. Bleeding controlled in triage.       The patient is a non-toxic and well appearing patient. On physical exam, patient appears well hydrated with moist mucus membranes. Breath sounds are clear and equal bilaterally with no adventitious breath sounds, tachypnea or respiratory distress. Regular rate and rhythm. No murmurs. Abdomen soft and non tender. Patient is tolerating PO without difficulty. 3 cm incision to the left cheek, bleeding well controlled. Incision to the left upper ear with definite bleeding, and redness, concerning for infection.     Differential diagnosis: postoperative complication, wound check, intractable bleeding, cellulitis.     Vital Signs Are Reassuring.     ED Course:Treatment in the ED included Physical Exam and   Medications - No data to display  .   Patient reports feeling better after treatment in the ER.   Discussed treatment, prescriptions, labs, and imaging results with the patient.    Discharge home with   ED Prescriptions       Medication Sig Dispense Start Date End Date Auth. Provider    acetaminophen (TYLENOL) 500 MG tablet Take 1 tablet (500 mg total) by mouth every 6 (six) hours as needed for Pain. 30 tablet 1/28/2023 -- Gill  Dylan, DO    mupirocin (BACTROBAN) 2 % ointment Apply topically 2 (two) times daily. Apply to wound 3 times a day for 10 days for 10 days 60 g 1/28/2023 2/7/2023 Gill Richardson, DO    loratadine (CLARITIN) 10 mg tablet Take 1 tablet (10 mg total) by mouth daily as needed (As needed for itching). 60 tablet 1/28/2023 1/28/2024 Gill Richardson, DO    cephALEXin (KEFLEX) 500 MG capsule Take 1 capsule (500 mg total) by mouth 4 (four) times daily. for 7 days 20 capsule 1/28/2023 2/4/2023 Gill Richardson, DO    hydrOXYzine HCL (ATARAX) 25 MG tablet Take 1 tablet (25 mg total) by mouth 4 (four) times daily as needed for Itching. 12 tablet 1/28/2023 -- Gill Richardson, DO          Fill and take prescriptions as directed.  Return to the ED if symptoms worsen or do not resolve.   Answered questions and discussed discharge plan.    Patient feels better and is ready for discharge.  Follow up with PCP/specialist in 1 day             Scribe Attestation:   Scribe #1: I performed the above scribed service and the documentation accurately describes the services I performed. I attest to the accuracy of the note.                 I, Dr. Gill Richardson, personally performed the services described in this documentation. All medical record entries made by the scribe were at my direction and in my presence. I have reviewed the chart and agree that the record reflects my personal performance and is accurate and complete.    Clinical Impression:   Final diagnoses:  [H92.02] Acute ear pain, left (Primary)  [H92.22] Bleeding from left ear - post op bleeding  [H60.12] Cellulitis of left ear        ED Disposition Condition    Discharge Stable          ED Prescriptions       Medication Sig Dispense Start Date End Date Auth. Provider    acetaminophen (TYLENOL) 500 MG tablet Take 1 tablet (500 mg total) by mouth every 6 (six) hours as needed for Pain. 30 tablet 1/28/2023 -- Gill Richardson, DO    mupirocin (BACTROBAN) 2 % ointment Apply topically 2 (two) times  daily. Apply to wound 3 times a day for 10 days for 10 days 60 g 1/28/2023 2/7/2023 Gill Richardson, DO    loratadine (CLARITIN) 10 mg tablet Take 1 tablet (10 mg total) by mouth daily as needed (As needed for itching). 60 tablet 1/28/2023 1/28/2024 Gill Richardson DO    cephALEXin (KEFLEX) 500 MG capsule Take 1 capsule (500 mg total) by mouth 4 (four) times daily. for 7 days 20 capsule 1/28/2023 2/4/2023 Gill Richardson, DO    hydrOXYzine HCL (ATARAX) 25 MG tablet Take 1 tablet (25 mg total) by mouth 4 (four) times daily as needed for Itching. 12 tablet 1/28/2023 -- Gill Richardson DO          Follow-up Information       Follow up With Specialties Details Why Contact Info    Anthony Denton MD Internal Medicine, Wound Care Schedule an appointment as soon as possible for a visit in 2 days  [address] [facility] [address]  [phone]      [facility] [place] [facility] - Emergency Dept Emergency Medicine Go to  Please go to [facility] emergency department if symptoms worsen [address]  [facility] [id number] [phone]             Gill Richardson DO  01/28/23 8231

## 2023-01-28 NOTE — ED TRIAGE NOTES
Patient had skin cancer removed from left side of his face and ear Wednesday, normally on Eliquis was stopped 3 days is due to take today but has bleeding to ear that started last night.

## 2023-03-10 NOTE — TELEPHONE ENCOUNTER
Called pt to discuss with him that Dr Monteiro would like for him to follow up with a dermatologist about the sores on his back. He stated I already saw a dermatologist. He stated that these blood spots could be coming from the Eliquis. Advised him I understood him to say that he had sores on his back that were bleeding when he took Eliquis. I am now understanding that since he started the eliguis these blood spot have appeared on his shoulders and back. When he takes Eliquis as proscribed they bleed. Advised I woulad speak with Dr Monteiro and get back with him. Advised pt to continue Eliquis and take as prescribed 5mg BID for 1 week and see if he tolerated it. Advised I would call back to check on him.    Called today to follow up with Pt. He states since he started the Eliquis 5mg BID he had not had any more bleeding.    Discussed with Dr Monteiro. If Pt doesn't tolerate the Eliquis he can be switched to Xarelto 20 mg QD.          ----- Message from Bry Monteiro MD sent at 4/5/2018  4:32 PM CDT -----  Regarding: RE: Eliquis  He needs to get that sore evaluated and treated, so he can tolerate the appropriate dose of eliquis!  DPM    ----- Message -----  From: Clarice Casiano RN  Sent: 4/5/2018   4:28 PM  To: Bry Monteiro MD  Subject: RE: Eliquis                                      Spoke with Pt. He states he was taking 5mg BID. When he does that he has a sore on his back that bleeds. So he started taking it 5 mg BID one day then  5 mg qd. The bleeding from the sores on his back stopped. Advised that Eliquis is not effective when dosed once daily. Advised I would speak with you and see what needed to be done.  ----- Message -----  From: Bry Monteiro MD  Sent: 4/5/2018   1:30 PM  To: Clarice Brooks RN  Subject: RE: Eliquis                                      He should take 5 mg by mouth twice a day, not 10 mg all at once.  Clarice please  him.  Thanks  DPM    ----- Message -----  From:  RT notified of need for treatment.    Pratima Carter  Sent: 4/5/2018   8:52 AM  To: Bry Monteiro MD, Clarice aCsiano RN  Subject: Eliquis                                          Dr. Monteiro,  Pt reports if he takes Eliquis 10 mg every other day and 5mg every other day alternating doses due to he has an abscess that starts bleeding if he takes 10 mg daily. States he never had this with Coumadin.  Hx of AF.    Please advise,  Monse Ambrocio

## 2023-04-14 ENCOUNTER — OFFICE VISIT (OUTPATIENT)
Dept: FAMILY MEDICINE | Facility: CLINIC | Age: 83
End: 2023-04-14
Payer: MEDICARE

## 2023-04-14 VITALS
HEIGHT: 72 IN | BODY MASS INDEX: 34.31 KG/M2 | RESPIRATION RATE: 16 BRPM | DIASTOLIC BLOOD PRESSURE: 66 MMHG | OXYGEN SATURATION: 96 % | SYSTOLIC BLOOD PRESSURE: 134 MMHG | TEMPERATURE: 98 F | HEART RATE: 69 BPM | WEIGHT: 253.31 LBS

## 2023-04-14 DIAGNOSIS — J98.8 WHEEZING-ASSOCIATED RESPIRATORY INFECTION: ICD-10-CM

## 2023-04-14 DIAGNOSIS — U07.1 ACUTE COVID-19: Primary | ICD-10-CM

## 2023-04-14 PROCEDURE — 99999 PR PBB SHADOW E&M-EST. PATIENT-LVL IV: CPT | Mod: PBBFAC,,, | Performed by: NURSE PRACTITIONER

## 2023-04-14 PROCEDURE — 1159F PR MEDICATION LIST DOCUMENTED IN MEDICAL RECORD: ICD-10-PCS | Mod: CPTII,S$GLB,, | Performed by: NURSE PRACTITIONER

## 2023-04-14 PROCEDURE — 1126F AMNT PAIN NOTED NONE PRSNT: CPT | Mod: CPTII,S$GLB,, | Performed by: NURSE PRACTITIONER

## 2023-04-14 PROCEDURE — 1101F PT FALLS ASSESS-DOCD LE1/YR: CPT | Mod: CPTII,S$GLB,, | Performed by: NURSE PRACTITIONER

## 2023-04-14 PROCEDURE — 1160F RVW MEDS BY RX/DR IN RCRD: CPT | Mod: CPTII,S$GLB,, | Performed by: NURSE PRACTITIONER

## 2023-04-14 PROCEDURE — 1126F PR PAIN SEVERITY QUANTIFIED, NO PAIN PRESENT: ICD-10-PCS | Mod: CPTII,S$GLB,, | Performed by: NURSE PRACTITIONER

## 2023-04-14 PROCEDURE — 3078F DIAST BP <80 MM HG: CPT | Mod: CPTII,S$GLB,, | Performed by: NURSE PRACTITIONER

## 2023-04-14 PROCEDURE — 99214 OFFICE O/P EST MOD 30 MIN: CPT | Mod: S$GLB,,, | Performed by: NURSE PRACTITIONER

## 2023-04-14 PROCEDURE — 3075F SYST BP GE 130 - 139MM HG: CPT | Mod: CPTII,S$GLB,, | Performed by: NURSE PRACTITIONER

## 2023-04-14 PROCEDURE — 3078F PR MOST RECENT DIASTOLIC BLOOD PRESSURE < 80 MM HG: ICD-10-PCS | Mod: CPTII,S$GLB,, | Performed by: NURSE PRACTITIONER

## 2023-04-14 PROCEDURE — 1160F PR REVIEW ALL MEDS BY PRESCRIBER/CLIN PHARMACIST DOCUMENTED: ICD-10-PCS | Mod: CPTII,S$GLB,, | Performed by: NURSE PRACTITIONER

## 2023-04-14 PROCEDURE — 99999 PR PBB SHADOW E&M-EST. PATIENT-LVL IV: ICD-10-PCS | Mod: PBBFAC,,, | Performed by: NURSE PRACTITIONER

## 2023-04-14 PROCEDURE — 3075F PR MOST RECENT SYSTOLIC BLOOD PRESS GE 130-139MM HG: ICD-10-PCS | Mod: CPTII,S$GLB,, | Performed by: NURSE PRACTITIONER

## 2023-04-14 PROCEDURE — 3288F FALL RISK ASSESSMENT DOCD: CPT | Mod: CPTII,S$GLB,, | Performed by: NURSE PRACTITIONER

## 2023-04-14 PROCEDURE — 1159F MED LIST DOCD IN RCRD: CPT | Mod: CPTII,S$GLB,, | Performed by: NURSE PRACTITIONER

## 2023-04-14 PROCEDURE — 1101F PR PT FALLS ASSESS DOC 0-1 FALLS W/OUT INJ PAST YR: ICD-10-PCS | Mod: CPTII,S$GLB,, | Performed by: NURSE PRACTITIONER

## 2023-04-14 PROCEDURE — 3288F PR FALLS RISK ASSESSMENT DOCUMENTED: ICD-10-PCS | Mod: CPTII,S$GLB,, | Performed by: NURSE PRACTITIONER

## 2023-04-14 PROCEDURE — 99214 PR OFFICE/OUTPT VISIT, EST, LEVL IV, 30-39 MIN: ICD-10-PCS | Mod: S$GLB,,, | Performed by: NURSE PRACTITIONER

## 2023-04-14 RX ORDER — ALBUTEROL SULFATE 90 UG/1
2 AEROSOL, METERED RESPIRATORY (INHALATION) EVERY 6 HOURS PRN
Qty: 18 G | Refills: 0 | Status: SHIPPED | OUTPATIENT
Start: 2023-04-14 | End: 2024-01-25 | Stop reason: SDUPTHER

## 2023-04-14 RX ORDER — GUAIFENESIN/DEXTROMETHORPHAN 100-10MG/5
5 SYRUP ORAL
Qty: 480 ML | Refills: 0 | Status: SHIPPED | OUTPATIENT
Start: 2023-04-14 | End: 2024-01-25 | Stop reason: SDUPTHER

## 2023-04-14 RX ORDER — BENZONATATE 200 MG/1
200 CAPSULE ORAL
Qty: 45 CAPSULE | Refills: 0 | Status: SHIPPED | OUTPATIENT
Start: 2023-04-14 | End: 2024-01-25 | Stop reason: SDUPTHER

## 2023-04-24 ENCOUNTER — TELEPHONE (OUTPATIENT)
Dept: FAMILY MEDICINE | Facility: CLINIC | Age: 83
End: 2023-04-24

## 2023-04-24 ENCOUNTER — OFFICE VISIT (OUTPATIENT)
Dept: FAMILY MEDICINE | Facility: CLINIC | Age: 83
End: 2023-04-24
Payer: MEDICARE

## 2023-04-24 VITALS
BODY MASS INDEX: 35.04 KG/M2 | OXYGEN SATURATION: 99 % | WEIGHT: 258.38 LBS | HEART RATE: 69 BPM | TEMPERATURE: 97 F | DIASTOLIC BLOOD PRESSURE: 68 MMHG | SYSTOLIC BLOOD PRESSURE: 132 MMHG

## 2023-04-24 DIAGNOSIS — J40 BRONCHITIS: Primary | ICD-10-CM

## 2023-04-24 DIAGNOSIS — R05.9 COUGH, UNSPECIFIED TYPE: ICD-10-CM

## 2023-04-24 PROCEDURE — 99999 PR PBB SHADOW E&M-EST. PATIENT-LVL IV: ICD-10-PCS | Mod: PBBFAC,,, | Performed by: INTERNAL MEDICINE

## 2023-04-24 PROCEDURE — 99999 PR PBB SHADOW E&M-EST. PATIENT-LVL IV: CPT | Mod: PBBFAC,,, | Performed by: INTERNAL MEDICINE

## 2023-04-24 PROCEDURE — 1160F PR REVIEW ALL MEDS BY PRESCRIBER/CLIN PHARMACIST DOCUMENTED: ICD-10-PCS | Mod: CPTII,S$GLB,, | Performed by: INTERNAL MEDICINE

## 2023-04-24 PROCEDURE — 1101F PT FALLS ASSESS-DOCD LE1/YR: CPT | Mod: CPTII,S$GLB,, | Performed by: INTERNAL MEDICINE

## 2023-04-24 PROCEDURE — 3075F PR MOST RECENT SYSTOLIC BLOOD PRESS GE 130-139MM HG: ICD-10-PCS | Mod: CPTII,S$GLB,, | Performed by: INTERNAL MEDICINE

## 2023-04-24 PROCEDURE — 3288F FALL RISK ASSESSMENT DOCD: CPT | Mod: CPTII,S$GLB,, | Performed by: INTERNAL MEDICINE

## 2023-04-24 PROCEDURE — 99214 OFFICE O/P EST MOD 30 MIN: CPT | Mod: S$GLB,,, | Performed by: INTERNAL MEDICINE

## 2023-04-24 PROCEDURE — 99214 PR OFFICE/OUTPT VISIT, EST, LEVL IV, 30-39 MIN: ICD-10-PCS | Mod: S$GLB,,, | Performed by: INTERNAL MEDICINE

## 2023-04-24 PROCEDURE — 1159F MED LIST DOCD IN RCRD: CPT | Mod: CPTII,S$GLB,, | Performed by: INTERNAL MEDICINE

## 2023-04-24 PROCEDURE — 1159F PR MEDICATION LIST DOCUMENTED IN MEDICAL RECORD: ICD-10-PCS | Mod: CPTII,S$GLB,, | Performed by: INTERNAL MEDICINE

## 2023-04-24 PROCEDURE — 3075F SYST BP GE 130 - 139MM HG: CPT | Mod: CPTII,S$GLB,, | Performed by: INTERNAL MEDICINE

## 2023-04-24 PROCEDURE — 3078F DIAST BP <80 MM HG: CPT | Mod: CPTII,S$GLB,, | Performed by: INTERNAL MEDICINE

## 2023-04-24 PROCEDURE — 1160F RVW MEDS BY RX/DR IN RCRD: CPT | Mod: CPTII,S$GLB,, | Performed by: INTERNAL MEDICINE

## 2023-04-24 PROCEDURE — 1101F PR PT FALLS ASSESS DOC 0-1 FALLS W/OUT INJ PAST YR: ICD-10-PCS | Mod: CPTII,S$GLB,, | Performed by: INTERNAL MEDICINE

## 2023-04-24 PROCEDURE — 1126F AMNT PAIN NOTED NONE PRSNT: CPT | Mod: CPTII,S$GLB,, | Performed by: INTERNAL MEDICINE

## 2023-04-24 PROCEDURE — 3078F PR MOST RECENT DIASTOLIC BLOOD PRESSURE < 80 MM HG: ICD-10-PCS | Mod: CPTII,S$GLB,, | Performed by: INTERNAL MEDICINE

## 2023-04-24 PROCEDURE — 3288F PR FALLS RISK ASSESSMENT DOCUMENTED: ICD-10-PCS | Mod: CPTII,S$GLB,, | Performed by: INTERNAL MEDICINE

## 2023-04-24 PROCEDURE — 1126F PR PAIN SEVERITY QUANTIFIED, NO PAIN PRESENT: ICD-10-PCS | Mod: CPTII,S$GLB,, | Performed by: INTERNAL MEDICINE

## 2023-04-24 RX ORDER — DOXYCYCLINE 100 MG/1
100 CAPSULE ORAL EVERY 12 HOURS
Qty: 14 CAPSULE | Refills: 0 | Status: SHIPPED | OUTPATIENT
Start: 2023-04-24 | End: 2023-05-01

## 2023-04-24 RX ORDER — PREDNISONE 20 MG/1
40 TABLET ORAL DAILY
Qty: 10 TABLET | Refills: 0 | Status: SHIPPED | OUTPATIENT
Start: 2023-04-24 | End: 2023-04-29

## 2023-04-24 RX ORDER — MONTELUKAST SODIUM 10 MG/1
10 TABLET ORAL NIGHTLY
Qty: 30 TABLET | Refills: 0 | Status: SHIPPED | OUTPATIENT
Start: 2023-04-24 | End: 2023-05-24

## 2023-04-24 RX ORDER — GUAIFENESIN 600 MG/1
1200 TABLET, EXTENDED RELEASE ORAL 2 TIMES DAILY
Qty: 60 TABLET | Refills: 0 | Status: SHIPPED | OUTPATIENT
Start: 2023-04-24 | End: 2024-01-25

## 2023-04-24 NOTE — TELEPHONE ENCOUNTER
----- Message from Dano Camargo MD sent at 4/24/2023 11:19 AM CDT -----  Please call patient to inform:  X ray did not show any new disease in the lungs.  Please advise to continue with plan to treat bronchitis with antibiotic and other medications that were prescribed at today's visit.

## 2023-04-24 NOTE — PROGRESS NOTES
HISTORY OF PRESENT ILLNESS:  Aldo Doll is a 82 y.o. male who presents to the clinic today for hospital F/U    My first encounter with patient.    Seen in ED 4/6/23 for COVID.  Seen by another provider last week.  Main complaint today is he still has cough that is now also productive.  He feels congestion in the chest.  His cardiologist is Dr. Collins at [facility] [place].  No chest pain.  No dyspnea, no wheezing reported.    PAST MEDICAL HISTORY:  Past Medical History:   Diagnosis Date    *Atrial fibrillation     Anticoagulant long-term use     Arthritis     Atrial fibrillation     Cardiomyopathy     CHF (congestive heart failure)     Coronary artery disease     Heart block     Hyperlipidemia     Hypertension        PAST SURGICAL HISTORY:  Past Surgical History:   Procedure Laterality Date    CHOLECYSTECTOMY      HERNIA REPAIR      INSERT / REPLACE / REMOVE PACEMAKER         SOCIAL HISTORY:  Social History     Socioeconomic History    Marital status:    Tobacco Use    Smoking status: Never    Smokeless tobacco: Never   Substance and Sexual Activity    Alcohol use: Yes     Alcohol/week: 3.0 standard drinks     Types: 3 Glasses of wine per week     Comment: daily    Drug use: No    Sexual activity: Yes     Partners: Female       FAMILY HISTORY:  Family History   Problem Relation Age of Onset    Cancer Mother         breast    Cancer Sister         breast    Mental illness Son         depression       ALLERGIES AND MEDICATIONS: updated and reviewed.  Review of patient's allergies indicates:   Allergen Reactions    Pradaxa [dabigatran etexilate] Itching and Rash    Ace inhibitors Other (See Comments)     Other reaction(s): cough  Other reaction(s): cough    Chocolate flavor Rash     Other reaction(s): Rash     Medication List with Changes/Refills   Current Medications    ACETAMINOPHEN (TYLENOL) 500 MG TABLET    Take 1 tablet (500 mg total) by mouth every 6 (six) hours as needed for Pain.    ALBUTEROL  (PROVENTIL/VENTOLIN HFA) 90 MCG/ACTUATION INHALER    Inhale 2 puffs into the lungs every 6 (six) hours as needed for Wheezing or Shortness of Breath. Rescue    AMIODARONE (PACERONE) 200 MG TAB    Take 100 mg by mouth once daily.    BENZONATATE (TESSALON) 200 MG CAPSULE    Take 1 capsule (200 mg total) by mouth every 6 to 8 hours as needed for Cough.    DEXTROMETHORPHAN-GUAIFENESIN  MG/5 ML (ROBITUSSIN-DM)  MG/5 ML LIQUID    Take 5 mLs by mouth every 6 to 8 hours as needed (cough, congestion).    ELIQUIS 5 MG TAB    Take 1 tablet by mouth twice daily    FISH OIL-OMEGA-3 FATTY ACIDS 300-1,000 MG CAPSULE    Take 2 capsules by mouth once daily.     HYDROXYZINE HCL (ATARAX) 25 MG TABLET    Take 1 tablet (25 mg total) by mouth 4 (four) times daily as needed for Itching.    LORATADINE (CLARITIN) 10 MG TABLET    Take 1 tablet (10 mg total) by mouth daily as needed (As needed for itching).    METOPROLOL SUCCINATE (TOPROL-XL) 50 MG 24 HR TABLET    Take 2 tablets (100 mg total) by mouth 2 (two) times daily.    VALSARTAN (DIOVAN) 80 MG TABLET    Take 2 tablets (160 mg total) by mouth once daily.          CARE TEAM:  Patient Care Team:  Anthony Denton MD as PCP - General (Internal Medicine)  Nakia Cavanaugh MA (Inactive)  Livan Villalobos MA as Care Coordinator  Nakia Cavanaugh MA (Inactive)  Nakia Cavanaugh MA (Inactive)  Nakia Cavanaugh MA (Inactive)  Inder Collins MD as Consulting Physician (Cardiology)  Skyler Ricks MD as Consulting Physician (Orthopedic Surgery)         REVIEW OF SYSTEMS:  Review of Systems   Constitutional:  Negative for chills and fever.   HENT:  Negative for congestion and postnasal drip.    Eyes:  Negative for photophobia and visual disturbance.   Respiratory:  Positive for cough. Negative for shortness of breath and wheezing.    Cardiovascular:  Negative for chest pain and palpitations.   Gastrointestinal:  Negative for nausea and vomiting.   Genitourinary:  Negative for dysuria  and frequency.   Musculoskeletal:  Negative for arthralgias and back pain.   Neurological:  Negative for light-headedness and headaches.   Psychiatric/Behavioral:  Negative for dysphoric mood. The patient is not nervous/anxious.        PHYSICAL EXAM:  Vitals:    04/24/23 1015   BP: 132/68   Pulse: 69   Temp: 97.2 °F (36.2 °C)             Body mass index is 35.04 kg/m².    Physical Examination: General appearance - alert, well appearing, and in no distress and oriented to person, place, and time  Mental status - normal mood, behavior, speech, dress, motor activity, and thought processes  Eyes - sclera anicteric, left eye normal, right eye normal  Ears - bilateral TM's and external ear canals normal  Mouth - mucous membranes moist, pharynx normal without lesions  Chest - no tachypnea, retractions or cyanosis, wheezing noted mild  Heart - normal rate and regular rhythm, no murmurs noted  Neurological - alert, oriented, normal speech, no focal findings or movement disorder noted  Extremities - peripheral pulses normal, no pedal edema, no clubbing or cyanosis       ASSESSMENT AND PLAN:  Bronchitis  Cough, unspecified type  -     montelukast (SINGULAIR) 10 mg tablet; Take 1 tablet (10 mg total) by mouth every evening.  Dispense: 30 tablet; Refill: 0  -     doxycycline (VIBRAMYCIN) 100 MG Cap; Take 1 capsule (100 mg total) by mouth every 12 (twelve) hours. for 7 days  Dispense: 14 capsule; Refill: 0  -     predniSONE (DELTASONE) 20 MG tablet; Take 2 tablets (40 mg total) by mouth once daily. for 5 days  Dispense: 10 tablet; Refill: 0  -     guaiFENesin (MUCINEX) 600 mg 12 hr tablet; Take 2 tablets (1,200 mg total) by mouth 2 (two) times daily.  Dispense: 60 tablet; Refill: 0  -     X-Ray Chest PA And Lateral; Future; Expected date: 04/24/2023  - May continue prn albuterol, Tessalon, Robitussin DM        Follow up 2 weeks if not better or sooner as needed.

## 2023-04-28 NOTE — PROGRESS NOTES
Subjective:      Patient ID: Aldo Doll is a 82 y.o. male.  New to me but seen previously in clinic by a fellow provider. Pt presents to clinic for ER f/u s/p covid 10 days ago. Today reports persistent cough and chest tightness     Cough  This is a new problem. The current episode started 1 to 4 weeks ago. The problem has been unchanged. The problem occurs constantly. The cough is Non-productive. Associated symptoms include myalgias and wheezing. Pertinent negatives include no chest pain, chills, ear congestion, ear pain, fever, headaches, heartburn, hemoptysis, nasal congestion, postnasal drip, rash, rhinorrhea, sore throat, shortness of breath, sweats or weight loss. The symptoms are aggravated by lying down. He has tried OTC cough suppressant and rest for the symptoms. The treatment provided no relief. There is no history of asthma, bronchiectasis, bronchitis, COPD, emphysema, environmental allergies or pneumonia.   Review of Systems   Constitutional:  Negative for activity change, appetite change, chills, fever, unexpected weight change and weight loss.   HENT:  Negative for nasal congestion, ear pain, postnasal drip, rhinorrhea, sneezing, sore throat and voice change.    Eyes:  Negative for pain and visual disturbance.   Respiratory:  Positive for cough and wheezing. Negative for hemoptysis, chest tightness and shortness of breath.    Cardiovascular:  Negative for chest pain, palpitations and leg swelling.   Gastrointestinal:  Negative for abdominal pain, constipation, diarrhea, heartburn, nausea and vomiting.   Endocrine: Negative.    Genitourinary:  Negative for difficulty urinating.   Musculoskeletal:  Positive for myalgias. Negative for arthralgias and gait problem.   Integumentary:  Negative for rash.   Allergic/Immunologic: Negative.  Negative for environmental allergies.   Neurological:  Negative for speech difficulty and headaches.   Hematological: Negative.    Psychiatric/Behavioral: Negative.      All other systems reviewed and are negative.      Objective:     Vitals:    04/14/23 0824   BP: 134/66   Pulse: 69   Resp: 16   Temp: 97.6 °F (36.4 °C)   TempSrc: Oral   SpO2: 96%   Weight: 114.9 kg (253 lb 4.9 oz)   Height: 6' (1.829 m)     Physical Exam  Vitals and nursing note reviewed.   Constitutional:       General: He is not in acute distress.     Appearance: Normal appearance. He is well-developed and well-groomed. He is not ill-appearing.   HENT:      Head: Normocephalic and atraumatic.      Right Ear: External ear normal.      Left Ear: External ear normal.      Nose: Nose normal.      Mouth/Throat:      Lips: Pink.      Mouth: Mucous membranes are moist.   Eyes:      General: Lids are normal. Vision grossly intact. Gaze aligned appropriately.      Conjunctiva/sclera: Conjunctivae normal.      Pupils: Pupils are equal, round, and reactive to light.   Neck:      Trachea: Phonation normal.   Cardiovascular:      Rate and Rhythm: Normal rate and regular rhythm.      Heart sounds: Normal heart sounds.   Pulmonary:      Effort: Pulmonary effort is normal. No accessory muscle usage or respiratory distress.      Breath sounds: Normal air entry. Wheezing (end inspiratory) and rhonchi (coarse) present. No decreased breath sounds or rales.   Abdominal:      General: Abdomen is flat. Bowel sounds are normal. There is no distension.      Palpations: Abdomen is soft.      Tenderness: There is no abdominal tenderness.   Musculoskeletal:      Cervical back: Neck supple.      Right lower leg: No edema.      Left lower leg: No edema.   Skin:     General: Skin is warm and dry.      Findings: No rash.   Neurological:      General: No focal deficit present.      Mental Status: He is alert and oriented to person, place, and time. Mental status is at baseline.      Sensory: Sensation is intact.      Motor: Motor function is intact.      Coordination: Coordination is intact.      Gait: Gait is intact.   Psychiatric:          Attention and Perception: Attention and perception normal.         Mood and Affect: Mood and affect normal.         Speech: Speech normal.         Behavior: Behavior normal. Behavior is cooperative.         Thought Content: Thought content normal.         Cognition and Memory: Cognition and memory normal.         Judgment: Judgment normal.     Assessment and Plan:     1. Acute COVID-19  Discussed diagnosis, its evaluation, treatment and usual course.  All questions answered.  Consider antibiotics, steroids and chest xray if fails to improve or worsens  Symptomatic therapy suggested: rest, increase fluids, gargle prn for sore throat, apply heat to sinuses prn, use mist of vaporizer prn, OTC acetaminophen, ibuprofen, and call prn if symptoms persist or worsen. Call or return to clinic prn if these symptoms worsen or fail to improve as anticipated.  - benzonatate (TESSALON) 200 MG capsule; Take 1 capsule (200 mg total) by mouth every 6 to 8 hours as needed for Cough.  Dispense: 45 capsule; Refill: 0  - dextromethorphan-guaiFENesin  mg/5 ml (ROBITUSSIN-DM)  mg/5 mL liquid; Take 5 mLs by mouth every 6 to 8 hours as needed (cough, congestion).  Dispense: 480 mL; Refill: 0  - albuterol (PROVENTIL/VENTOLIN HFA) 90 mcg/actuation inhaler; Inhale 2 puffs into the lungs every 6 (six) hours as needed for Wheezing or Shortness of Breath. Rescue  Dispense: 18 g; Refill: 0    2. Wheezing-associated respiratory infection  Discussed distinction between quick-relief and controlled medications.  Discussed medication dosage, use, side effects, and goals of treatment in detail.    Warning signs of respiratory distress were reviewed with the patient.   Discussed avoidance of precipitants.  Discussed technique for using MDIs and/or nebulizer.  Discussed monitoring symptoms and use of quick-relief medications and contacting us early in the course of exacerbations.  - albuterol (PROVENTIL/VENTOLIN HFA) 90 mcg/actuation inhaler;  Inhale 2 puffs into the lungs every 6 (six) hours as needed for Wheezing or Shortness of Breath. Rescue  Dispense: 18 g; Refill: 0           DEMETRICE Long, FNP-C  Family/Internal Medicine  [facility]

## 2023-05-04 ENCOUNTER — TELEPHONE (OUTPATIENT)
Dept: FAMILY MEDICINE | Facility: CLINIC | Age: 83
End: 2023-05-04
Payer: MEDICARE

## 2024-01-25 ENCOUNTER — OFFICE VISIT (OUTPATIENT)
Dept: FAMILY MEDICINE | Facility: CLINIC | Age: 84
End: 2024-01-25
Payer: MEDICARE

## 2024-01-25 VITALS
WEIGHT: 239.19 LBS | HEART RATE: 79 BPM | SYSTOLIC BLOOD PRESSURE: 128 MMHG | TEMPERATURE: 98 F | DIASTOLIC BLOOD PRESSURE: 58 MMHG | HEIGHT: 72 IN | OXYGEN SATURATION: 98 % | BODY MASS INDEX: 32.4 KG/M2

## 2024-01-25 DIAGNOSIS — R05.3 CHRONIC COUGH: ICD-10-CM

## 2024-01-25 PROBLEM — I44.2 ATRIOVENTRICULAR BLOCK, COMPLETE: Status: ACTIVE | Noted: 2023-05-31

## 2024-01-25 PROBLEM — D69.6 THROMBOCYTOPENIA: Status: ACTIVE | Noted: 2023-05-23

## 2024-01-25 PROBLEM — Z96.642 PRESENCE OF ARTIFICIAL HIP JOINT, LEFT: Status: ACTIVE | Noted: 2022-04-25

## 2024-01-25 PROBLEM — T82.111A PACEMAKER MALFUNCTION, INITIAL ENCOUNTER: Status: RESOLVED | Noted: 2020-09-06 | Resolved: 2024-01-25

## 2024-01-25 PROBLEM — N40.2 PROSTATE NODULE: Status: ACTIVE | Noted: 2024-01-22

## 2024-01-25 PROBLEM — U07.1 ACUTE COVID-19: Status: RESOLVED | Noted: 2023-04-14 | Resolved: 2024-01-25

## 2024-01-25 PROBLEM — R00.1 BRADYCARDIA: Status: RESOLVED | Noted: 2020-09-07 | Resolved: 2024-01-25

## 2024-01-25 PROCEDURE — 3078F DIAST BP <80 MM HG: CPT | Mod: CPTII,S$GLB,, | Performed by: NURSE PRACTITIONER

## 2024-01-25 PROCEDURE — 3288F FALL RISK ASSESSMENT DOCD: CPT | Mod: CPTII,S$GLB,, | Performed by: NURSE PRACTITIONER

## 2024-01-25 PROCEDURE — 1101F PT FALLS ASSESS-DOCD LE1/YR: CPT | Mod: CPTII,S$GLB,, | Performed by: NURSE PRACTITIONER

## 2024-01-25 PROCEDURE — 99214 OFFICE O/P EST MOD 30 MIN: CPT | Mod: S$GLB,,, | Performed by: NURSE PRACTITIONER

## 2024-01-25 PROCEDURE — 99999 PR PBB SHADOW E&M-EST. PATIENT-LVL III: CPT | Mod: PBBFAC,,, | Performed by: NURSE PRACTITIONER

## 2024-01-25 PROCEDURE — 1160F RVW MEDS BY RX/DR IN RCRD: CPT | Mod: CPTII,S$GLB,, | Performed by: NURSE PRACTITIONER

## 2024-01-25 PROCEDURE — 1126F AMNT PAIN NOTED NONE PRSNT: CPT | Mod: CPTII,S$GLB,, | Performed by: NURSE PRACTITIONER

## 2024-01-25 PROCEDURE — 3074F SYST BP LT 130 MM HG: CPT | Mod: CPTII,S$GLB,, | Performed by: NURSE PRACTITIONER

## 2024-01-25 PROCEDURE — 1159F MED LIST DOCD IN RCRD: CPT | Mod: CPTII,S$GLB,, | Performed by: NURSE PRACTITIONER

## 2024-01-25 RX ORDER — ALBUTEROL SULFATE 90 UG/1
2 AEROSOL, METERED RESPIRATORY (INHALATION) EVERY 6 HOURS PRN
Qty: 20.1 EACH | Refills: 0 | Status: SHIPPED | OUTPATIENT
Start: 2024-01-25

## 2024-01-25 RX ORDER — GUAIFENESIN AND DEXTROMETHORPHAN HYDROBROMIDE 10; 100 MG/5ML; MG/5ML
5 SYRUP ORAL
Qty: 240 ML | Refills: 0 | Status: SHIPPED | OUTPATIENT
Start: 2024-01-25

## 2024-01-25 RX ORDER — BENZONATATE 200 MG/1
200 CAPSULE ORAL 3 TIMES DAILY PRN
Qty: 30 CAPSULE | Refills: 0 | Status: SHIPPED | OUTPATIENT
Start: 2024-01-25

## 2024-01-29 NOTE — PROGRESS NOTES
Subjective:      Patient ID: Aldo Doll is a 83 y.o. male.  Pt returns to clinic with persistent dry hacky cough. Reports chronic cough that has worsened over last few weeks. Taking mucinex without relief.     Cough  Chronicity: acute on chronic. The current episode started 1 to 4 weeks ago. The problem has been unchanged. The problem occurs every few minutes. The cough is Non-productive. Associated symptoms include postnasal drip and rhinorrhea. Pertinent negatives include no chest pain, chills, ear congestion, ear pain, fever, headaches, heartburn, hemoptysis, myalgias, nasal congestion, rash, sore throat, shortness of breath, sweats, weight loss or wheezing. The symptoms are aggravated by lying down. Risk factors for lung disease include travel. He has tried OTC cough suppressant and rest for the symptoms. The treatment provided no relief. His past medical history is significant for bronchitis and environmental allergies. There is no history of asthma, bronchiectasis, COPD, emphysema or pneumonia.     Review of Systems   Constitutional:  Negative for activity change, appetite change, chills, fatigue, fever, unexpected weight change and weight loss.   HENT:  Positive for postnasal drip and rhinorrhea. Negative for nasal congestion, ear pain, sinus pressure/congestion, sneezing, sore throat, trouble swallowing and voice change.    Eyes:  Negative for pain and visual disturbance.   Respiratory:  Positive for cough. Negative for hemoptysis, chest tightness, shortness of breath and wheezing.    Cardiovascular:  Negative for chest pain, palpitations and leg swelling.   Gastrointestinal:  Negative for abdominal pain, constipation, diarrhea, heartburn, nausea and vomiting.   Endocrine: Negative.    Genitourinary:  Negative for difficulty urinating.   Musculoskeletal:  Negative for arthralgias, back pain, gait problem and myalgias.   Integumentary:  Negative for rash.   Allergic/Immunologic: Positive for environmental  allergies.   Neurological:  Negative for speech difficulty and headaches.   Hematological: Negative.    Psychiatric/Behavioral: Negative.     All other systems reviewed and are negative.        Objective:     Vitals:    01/25/24 1506   BP: (!) 128/58   Pulse: 79   Temp: 97.8 °F (36.6 °C)   TempSrc: Oral   SpO2: 98%   Weight: 108.5 kg (239 lb 3.2 oz)   Height: 6' (1.829 m)     Physical Exam  Vitals and nursing note reviewed.   Constitutional:       General: He is not in acute distress.     Appearance: Normal appearance. He is well-developed and well-groomed. He is not ill-appearing.   HENT:      Head: Normocephalic and atraumatic.      Right Ear: External ear normal.      Left Ear: External ear normal.      Nose: Nose normal.      Mouth/Throat:      Lips: Pink.      Mouth: Mucous membranes are moist.   Eyes:      General: Lids are normal. Vision grossly intact. Gaze aligned appropriately.      Conjunctiva/sclera: Conjunctivae normal.      Pupils: Pupils are equal, round, and reactive to light.   Neck:      Trachea: Phonation normal.   Cardiovascular:      Rate and Rhythm: Normal rate and regular rhythm.      Heart sounds: Murmur heard.   Pulmonary:      Effort: Pulmonary effort is normal. No accessory muscle usage, prolonged expiration or respiratory distress.      Breath sounds: Normal air entry. No transmitted upper airway sounds. Rhonchi present. No decreased breath sounds, wheezing or rales.   Abdominal:      General: Abdomen is flat. Bowel sounds are normal. There is no distension.      Palpations: Abdomen is soft.      Tenderness: There is no abdominal tenderness.   Musculoskeletal:      Cervical back: Neck supple.      Right lower leg: No edema.      Left lower leg: No edema.   Skin:     General: Skin is warm and dry.      Findings: No rash.   Neurological:      General: No focal deficit present.      Mental Status: He is alert and oriented to person, place, and time. Mental status is at baseline.       Sensory: Sensation is intact.      Motor: Motor function is intact.      Coordination: Coordination is intact.      Gait: Gait is intact.   Psychiatric:         Attention and Perception: Attention and perception normal.         Mood and Affect: Mood and affect normal.         Speech: Speech normal.         Behavior: Behavior normal. Behavior is cooperative.         Thought Content: Thought content normal.         Cognition and Memory: Cognition and memory normal.         Judgment: Judgment normal.       Assessment and Plan:     1. Chronic cough  Explained lack of efficacy of antibiotics in viral disease.  Antitussives per medication orders.  Avoid exposure to tobacco smoke and fumes.  B-agonist inhaler.  Call if shortness of breath worsens, blood in sputum, change in character of cough, development of fever or chills, inability to maintain nutrition and hydration. Avoid exposure to tobacco smoke and fumes.  - dextromethorphan-guaiFENesin  mg/5 ml (ROBITUSSIN-DM)  mg/5 mL liquid; Take 5 mLs by mouth every 6 to 8 hours as needed (cough, congestion).  Dispense: 240 mL; Refill: 0  - benzonatate (TESSALON) 200 MG capsule; Take 1 capsule (200 mg total) by mouth 3 (three) times daily as needed for Cough.  Dispense: 30 capsule; Refill: 0  - albuterol (PROVENTIL/VENTOLIN HFA) 90 mcg/actuation inhaler; Inhale 2 puffs into the lungs every 6 (six) hours as needed for Wheezing or Shortness of Breath. Rescue  Dispense: 20.1 each; Refill: 0           DEMETRICE Long, FNP-C  Family/Internal Medicine  [facility]

## 2024-05-30 ENCOUNTER — TELEPHONE (OUTPATIENT)
Dept: PULMONOLOGY | Facility: CLINIC | Age: 84
End: 2024-05-30
Payer: MEDICARE

## 2024-05-30 ENCOUNTER — OFFICE VISIT (OUTPATIENT)
Dept: FAMILY MEDICINE | Facility: CLINIC | Age: 84
End: 2024-05-30
Payer: MEDICARE

## 2024-05-30 VITALS
DIASTOLIC BLOOD PRESSURE: 52 MMHG | SYSTOLIC BLOOD PRESSURE: 100 MMHG | HEIGHT: 72 IN | RESPIRATION RATE: 16 BRPM | BODY MASS INDEX: 31.69 KG/M2 | HEART RATE: 77 BPM | OXYGEN SATURATION: 98 % | WEIGHT: 233.94 LBS

## 2024-05-30 DIAGNOSIS — R91.1 PULMONARY NODULE: Primary | ICD-10-CM

## 2024-05-30 DIAGNOSIS — H66.91 RIGHT OTITIS MEDIA, UNSPECIFIED OTITIS MEDIA TYPE: ICD-10-CM

## 2024-05-30 PROCEDURE — 99214 OFFICE O/P EST MOD 30 MIN: CPT | Mod: S$GLB,,, | Performed by: FAMILY MEDICINE

## 2024-05-30 PROCEDURE — 3288F FALL RISK ASSESSMENT DOCD: CPT | Mod: CPTII,S$GLB,, | Performed by: FAMILY MEDICINE

## 2024-05-30 PROCEDURE — 1101F PT FALLS ASSESS-DOCD LE1/YR: CPT | Mod: CPTII,S$GLB,, | Performed by: FAMILY MEDICINE

## 2024-05-30 PROCEDURE — 3078F DIAST BP <80 MM HG: CPT | Mod: CPTII,S$GLB,, | Performed by: FAMILY MEDICINE

## 2024-05-30 PROCEDURE — 1159F MED LIST DOCD IN RCRD: CPT | Mod: CPTII,S$GLB,, | Performed by: FAMILY MEDICINE

## 2024-05-30 PROCEDURE — 99999 PR PBB SHADOW E&M-EST. PATIENT-LVL IV: CPT | Mod: PBBFAC,,, | Performed by: FAMILY MEDICINE

## 2024-05-30 PROCEDURE — 1126F AMNT PAIN NOTED NONE PRSNT: CPT | Mod: CPTII,S$GLB,, | Performed by: FAMILY MEDICINE

## 2024-05-30 PROCEDURE — 3074F SYST BP LT 130 MM HG: CPT | Mod: CPTII,S$GLB,, | Performed by: FAMILY MEDICINE

## 2024-05-30 RX ORDER — TAMSULOSIN HYDROCHLORIDE 0.4 MG/1
1 CAPSULE ORAL NIGHTLY
COMMUNITY
Start: 2024-04-18

## 2024-05-30 RX ORDER — AMOXICILLIN AND CLAVULANATE POTASSIUM 875; 125 MG/1; MG/1
1 TABLET, FILM COATED ORAL EVERY 12 HOURS
Qty: 14 TABLET | Refills: 0 | Status: SHIPPED | OUTPATIENT
Start: 2024-05-30 | End: 2024-06-06

## 2024-05-30 NOTE — PROGRESS NOTES
Subjective:       Patient ID: Aldo Doll is a 83 y.o. male.    Chief Complaint: Follow-up (Follow up)      Follow-up    83-year-old male presents for imaging results.  Patient states he had a PET scan to evaluate for his prostate.  On his PET scan it showed nodule in his lungs.  Patient states he was concerned and would like to discuss next steps.  Has not smoked since he was a teenager.    Review of Systems   Constitutional: Negative.    HENT: Negative.     Respiratory: Negative.     Cardiovascular: Negative.    Gastrointestinal: Negative.    Endocrine: Negative.    Genitourinary: Negative.    Musculoskeletal: Negative.    Neurological: Negative.    Psychiatric/Behavioral: Negative.            Past Medical History:   Diagnosis Date    *Atrial fibrillation     Anticoagulant long-term use     Arthritis     Atrial fibrillation     Cardiomyopathy     CHF (congestive heart failure)     Coronary artery disease     Heart block     Hyperlipidemia     Hypertension      Past Surgical History:   Procedure Laterality Date    CHOLECYSTECTOMY      HERNIA REPAIR      INSERT / REPLACE / REMOVE PACEMAKER       Family History   Problem Relation Name Age of Onset    Cancer Mother          breast    Cancer Sister          breast    Mental illness Son          depression     Social History     Socioeconomic History    Marital status:    Tobacco Use    Smoking status: Never    Smokeless tobacco: Never   Substance and Sexual Activity    Alcohol use: Not Currently     Alcohol/week: 3.0 standard drinks of alcohol     Types: 3 Glasses of wine per week     Comment: patient quit a year ago.    Drug use: Never    Sexual activity: Not Currently     Partners: Female       Current Outpatient Medications:     acetaminophen (TYLENOL) 500 MG tablet, Take 1 tablet (500 mg total) by mouth every 6 (six) hours as needed for Pain., Disp: 30 tablet, Rfl: 0    albuterol (PROVENTIL/VENTOLIN HFA) 90 mcg/actuation inhaler, Inhale 2 puffs into the  lungs every 6 (six) hours as needed for Wheezing or Shortness of Breath. Rescue, Disp: 20.1 each, Rfl: 0    amiodarone (PACERONE) 200 MG Tab, Take 100 mg by mouth once daily., Disp: , Rfl:     dextromethorphan-guaiFENesin  mg/5 ml (ROBITUSSIN-DM)  mg/5 mL liquid, Take 5 mLs by mouth every 6 to 8 hours as needed (cough, congestion)., Disp: 240 mL, Rfl: 0    ELIQUIS 5 mg Tab, Take 1 tablet by mouth twice daily, Disp: 180 tablet, Rfl: 3    fish oil-omega-3 fatty acids 300-1,000 mg capsule, Take 2 capsules by mouth once daily. , Disp: , Rfl:     metoprolol succinate (TOPROL-XL) 50 MG 24 hr tablet, Take 2 tablets (100 mg total) by mouth 2 (two) times daily., Disp: 60 tablet, Rfl: 11    tamsulosin (FLOMAX) 0.4 mg Cap, Take 1 capsule by mouth every evening., Disp: , Rfl:     amoxicillin-clavulanate 875-125mg (AUGMENTIN) 875-125 mg per tablet, Take 1 tablet by mouth every 12 (twelve) hours. for 7 days, Disp: 14 tablet, Rfl: 0    benzonatate (TESSALON) 200 MG capsule, Take 1 capsule (200 mg total) by mouth 3 (three) times daily as needed for Cough. (Patient not taking: Reported on 5/30/2024), Disp: 30 capsule, Rfl: 0    hydrOXYzine HCL (ATARAX) 25 MG tablet, Take 1 tablet (25 mg total) by mouth 4 (four) times daily as needed for Itching. (Patient not taking: Reported on 5/30/2024), Disp: 12 tablet, Rfl: 0    valsartan (DIOVAN) 80 MG tablet, Take 2 tablets (160 mg total) by mouth once daily., Disp: 60 tablet, Rfl: 11   Objective:      Vitals:    05/30/24 1306   BP: (!) 100/52   BP Location: Left arm   Patient Position: Sitting   BP Method: Medium (Manual)   Pulse: 77   Resp: 16   SpO2: 98%   Weight: 106.1 kg (233 lb 14.5 oz)   Height: 6' (1.829 m)       Physical Exam  Constitutional:       General: He is not in acute distress.     Appearance: He is not diaphoretic.   HENT:      Head: Normocephalic and atraumatic.      Right Ear: Swelling present. Tympanic membrane is bulging.   Eyes:      Conjunctiva/sclera:  Conjunctivae normal.   Pulmonary:      Effort: Pulmonary effort is normal.   Musculoskeletal:      Cervical back: Neck supple.   Neurological:      Mental Status: He is alert and oriented to person, place, and time.   Psychiatric:         Behavior: Behavior normal.         Thought Content: Thought content normal.         Judgment: Judgment normal.            Assessment:       1. Pulmonary nodule    2. Right otitis media, unspecified otitis media type        Plan:       Pulmonary nodule  -     Ambulatory referral/consult to Pulmonology; Future; Expected date: 06/06/2024    Right otitis media, unspecified otitis media type  -     amoxicillin-clavulanate 875-125mg (AUGMENTIN) 875-125 mg per tablet; Take 1 tablet by mouth every 12 (twelve) hours. for 7 days  Dispense: 14 tablet; Refill: 0    Place referral to pulmonology for irregular 7 mm nodule.  From radiology read it looks like scarring. Patient does have a history of multiple pneumonias when he was a child.  On PE seems like patient has ear infection.  Advised patient follow with ENT and will start with antibiotics.  Continue follow up with other specialists.  Advised patient to reach out to Oncology to see if they can refer him to [facility] pulmonology.          No future appointments.    Patient note was created using Gemmy[employer].  Any errors in syntax or even information may not have been identified and edited on initial review prior to signing this note.

## 2024-05-30 NOTE — TELEPHONE ENCOUNTER
Message sent to the staff letting thm know I would need the images prior to a appointment being scheduled.

## 2024-05-30 NOTE — TELEPHONE ENCOUNTER
----- Message from Mari Cook MA sent at 5/30/2024  2:00 PM CDT -----  Regarding: referral , appoint  Hello,    Linda patient has a referral to Pulmonology. Can you please reach out to patient for scheduling? Thank you!

## 2024-09-27 DIAGNOSIS — R91.8 MASS OF LEFT LUNG: Primary | ICD-10-CM

## 2024-10-08 ENCOUNTER — HOSPITAL ENCOUNTER (OUTPATIENT)
Dept: RADIOLOGY | Facility: HOSPITAL | Age: 84
Discharge: HOME | End: 2024-10-08
Attending: UROLOGY
Payer: MEDICARE

## 2024-10-08 DIAGNOSIS — R91.8 MASS OF LEFT LUNG: ICD-10-CM

## 2024-10-08 LAB — GLUCOSE SERPL-MCNC: 112 MG/DL (ref 70–110)

## 2024-10-08 PROCEDURE — 78815 PET IMAGE W/CT SKULL-THIGH: CPT | Mod: 26,PS,, | Performed by: STUDENT IN AN ORGANIZED HEALTH CARE EDUCATION/TRAINING PROGRAM

## 2024-10-08 PROCEDURE — A9552 F18 FDG: HCPCS | Mod: PN

## 2024-10-08 PROCEDURE — 78815 PET IMAGE W/CT SKULL-THIGH: CPT | Mod: TC,PN

## 2024-10-08 RX ORDER — FLUDEOXYGLUCOSE F18 500 MCI/ML
12.6 INJECTION INTRAVENOUS
Status: COMPLETED | OUTPATIENT
Start: 2024-10-08 | End: 2024-10-08

## 2024-10-08 RX ADMIN — FLUDEOXYGLUCOSE F-18 12.6 MILLICURIE: 500 INJECTION INTRAVENOUS at 12:10

## 2024-10-08 NOTE — PROGRESS NOTES
PET Imaging Questionnaire    Are you a Diabetic? Recent Blood Sugar level? No    Are you anemic? Bone Marrow Stimulation Meds? No    Have you had a CT Scan, if so when & where was your last one?     Have you had a PET Scan, if so when & where was your last one? No    Chemotherapy or currently on Chemotherapy? No    Radiation therapy? No    Surgical History:   Past Surgical History:   Procedure Laterality Date    CHOLECYSTECTOMY      HERNIA REPAIR      INSERT / REPLACE / REMOVE PACEMAKER          Have you been fasting for at least 6 hours? Yes    Is there any chance you may be pregnant or breastfeeding? No    Assay: 13.3 MCi@:12:39   Injection Site:LT AC    Residual: 0.7 mCi@: 12:43   Technologist: Joe Srivastava Injected:12.6 mCi

## 2025-02-07 ENCOUNTER — PATIENT OUTREACH (OUTPATIENT)
Dept: ADMINISTRATIVE | Facility: HOSPITAL | Age: 85
End: 2025-02-07
Payer: MEDICARE

## 2025-02-07 NOTE — PROGRESS NOTES
Reached out to pt in regards to scheduling an OV, left voicemail. Immunization's updated/triggered. Gap report updated.

## 2025-06-09 ENCOUNTER — PATIENT MESSAGE (OUTPATIENT)
Dept: ADMINISTRATIVE | Facility: HOSPITAL | Age: 85
End: 2025-06-09
Payer: MEDICARE